# Patient Record
Sex: FEMALE | Race: WHITE | NOT HISPANIC OR LATINO | Employment: FULL TIME | URBAN - METROPOLITAN AREA
[De-identification: names, ages, dates, MRNs, and addresses within clinical notes are randomized per-mention and may not be internally consistent; named-entity substitution may affect disease eponyms.]

---

## 2017-02-06 ENCOUNTER — HOSPITAL ENCOUNTER (OUTPATIENT)
Dept: RADIOLOGY | Facility: MEDICAL CENTER | Age: 57
End: 2017-02-06
Attending: FAMILY MEDICINE
Payer: COMMERCIAL

## 2017-02-06 DIAGNOSIS — Z13.9 SCREENING: ICD-10-CM

## 2017-02-06 PROCEDURE — 77063 BREAST TOMOSYNTHESIS BI: CPT

## 2017-09-21 ENCOUNTER — HOSPITAL ENCOUNTER (OUTPATIENT)
Dept: LAB | Facility: MEDICAL CENTER | Age: 57
End: 2017-09-21
Attending: FAMILY MEDICINE
Payer: COMMERCIAL

## 2017-09-21 LAB
BDY FAT % MEASURED: 34.1 %
BP DIAS: 78 MMHG
BP SYS: 107 MMHG
CHOLEST SERPL-MCNC: 216 MG/DL (ref 100–199)
DIABETES HTDIA: NO
EVENT NAME HTEVT: NORMAL
FASTING HTFAS: YES
GLUCOSE SERPL-MCNC: 78 MG/DL (ref 65–99)
HDLC SERPL-MCNC: 48 MG/DL
HYPERTENSION HTHYP: YES
LDLC SERPL CALC-MCNC: 145 MG/DL
SCREENING LOC CITY HTCIT: NORMAL
SCREENING LOC STATE HTSTA: NORMAL
SCREENING LOCATION HTLOC: NORMAL
SMOKING HTSMO: NO
SUBSCRIBER ID HTSID: NORMAL
TRIGL SERPL-MCNC: 116 MG/DL (ref 0–149)

## 2017-09-21 PROCEDURE — 80061 LIPID PANEL: CPT

## 2017-09-21 PROCEDURE — 36415 COLL VENOUS BLD VENIPUNCTURE: CPT

## 2017-09-21 PROCEDURE — S5190 WELLNESS ASSESSMENT BY NONPH: HCPCS

## 2017-09-21 PROCEDURE — 82947 ASSAY GLUCOSE BLOOD QUANT: CPT

## 2018-02-09 ENCOUNTER — HOSPITAL ENCOUNTER (OUTPATIENT)
Dept: LAB | Facility: MEDICAL CENTER | Age: 58
End: 2018-02-09
Attending: FAMILY MEDICINE
Payer: COMMERCIAL

## 2018-02-09 LAB
25(OH)D3 SERPL-MCNC: 17 NG/ML (ref 30–100)
ALBUMIN SERPL BCP-MCNC: 5.3 G/DL (ref 3.2–4.9)
ALBUMIN/GLOB SERPL: 2.3 G/DL
ALP SERPL-CCNC: 59 U/L (ref 30–99)
ALT SERPL-CCNC: 17 U/L (ref 2–50)
ANION GAP SERPL CALC-SCNC: 7 MMOL/L (ref 0–11.9)
AST SERPL-CCNC: 17 U/L (ref 12–45)
BASOPHILS # BLD AUTO: 0.7 % (ref 0–1.8)
BASOPHILS # BLD: 0.03 K/UL (ref 0–0.12)
BILIRUB SERPL-MCNC: 0.8 MG/DL (ref 0.1–1.5)
BUN SERPL-MCNC: 21 MG/DL (ref 8–22)
CALCIUM SERPL-MCNC: 9.7 MG/DL (ref 8.5–10.5)
CHLORIDE SERPL-SCNC: 101 MMOL/L (ref 96–112)
CO2 SERPL-SCNC: 28 MMOL/L (ref 20–33)
CREAT SERPL-MCNC: 0.77 MG/DL (ref 0.5–1.4)
CRP SERPL HS-MCNC: 1.1 MG/L (ref 0–7.5)
DHEA-S SERPL-MCNC: 37.2 UG/DL (ref 18.9–205)
EOSINOPHIL # BLD AUTO: 0.11 K/UL (ref 0–0.51)
EOSINOPHIL NFR BLD: 2.5 % (ref 0–6.9)
ERYTHROCYTE [DISTWIDTH] IN BLOOD BY AUTOMATED COUNT: 41.1 FL (ref 35.9–50)
EST. AVERAGE GLUCOSE BLD GHB EST-MCNC: 105 MG/DL
ESTRADIOL SERPL-MCNC: <20 PG/ML
GLOBULIN SER CALC-MCNC: 2.3 G/DL (ref 1.9–3.5)
GLUCOSE SERPL-MCNC: 89 MG/DL (ref 65–99)
HBA1C MFR BLD: 5.3 % (ref 0–5.6)
HCT VFR BLD AUTO: 44.9 % (ref 37–47)
HCYS SERPL-SCNC: 13.22 UMOL/L
HGB BLD-MCNC: 15.7 G/DL (ref 12–16)
IMM GRANULOCYTES # BLD AUTO: 0.01 K/UL (ref 0–0.11)
IMM GRANULOCYTES NFR BLD AUTO: 0.2 % (ref 0–0.9)
LYMPHOCYTES # BLD AUTO: 1.96 K/UL (ref 1–4.8)
LYMPHOCYTES NFR BLD: 44.4 % (ref 22–41)
MCH RBC QN AUTO: 33 PG (ref 27–33)
MCHC RBC AUTO-ENTMCNC: 35 G/DL (ref 33.6–35)
MCV RBC AUTO: 94.3 FL (ref 81.4–97.8)
MONOCYTES # BLD AUTO: 0.42 K/UL (ref 0–0.85)
MONOCYTES NFR BLD AUTO: 9.5 % (ref 0–13.4)
NEUTROPHILS # BLD AUTO: 1.88 K/UL (ref 2–7.15)
NEUTROPHILS NFR BLD: 42.7 % (ref 44–72)
NRBC # BLD AUTO: 0 K/UL
NRBC BLD-RTO: 0 /100 WBC
PLATELET # BLD AUTO: 339 K/UL (ref 164–446)
PMV BLD AUTO: 9.8 FL (ref 9–12.9)
POTASSIUM SERPL-SCNC: 4 MMOL/L (ref 3.6–5.5)
PROGEST SERPL-MCNC: <0.08 NG/ML
PROT SERPL-MCNC: 7.6 G/DL (ref 6–8.2)
RBC # BLD AUTO: 4.76 M/UL (ref 4.2–5.4)
SODIUM SERPL-SCNC: 136 MMOL/L (ref 135–145)
T3FREE SERPL-MCNC: 3.73 PG/ML (ref 2.4–4.2)
THYROPEROXIDASE AB SERPL-ACNC: <0.2 IU/ML (ref 0–9)
TSH SERPL DL<=0.005 MIU/L-ACNC: 2.2 UIU/ML (ref 0.38–5.33)
WBC # BLD AUTO: 4.4 K/UL (ref 4.8–10.8)

## 2018-02-09 PROCEDURE — 82627 DEHYDROEPIANDROSTERONE: CPT

## 2018-02-09 PROCEDURE — 83036 HEMOGLOBIN GLYCOSYLATED A1C: CPT

## 2018-02-09 PROCEDURE — 86141 C-REACTIVE PROTEIN HS: CPT

## 2018-02-09 PROCEDURE — 84403 ASSAY OF TOTAL TESTOSTERONE: CPT

## 2018-02-09 PROCEDURE — 85025 COMPLETE CBC W/AUTO DIFF WBC: CPT

## 2018-02-09 PROCEDURE — 84270 ASSAY OF SEX HORMONE GLOBUL: CPT

## 2018-02-09 PROCEDURE — 84443 ASSAY THYROID STIM HORMONE: CPT

## 2018-02-09 PROCEDURE — 84144 ASSAY OF PROGESTERONE: CPT

## 2018-02-09 PROCEDURE — 86800 THYROGLOBULIN ANTIBODY: CPT

## 2018-02-09 PROCEDURE — 82670 ASSAY OF TOTAL ESTRADIOL: CPT

## 2018-02-09 PROCEDURE — 84305 ASSAY OF SOMATOMEDIN: CPT

## 2018-02-09 PROCEDURE — 86376 MICROSOMAL ANTIBODY EACH: CPT

## 2018-02-09 PROCEDURE — 80053 COMPREHEN METABOLIC PANEL: CPT

## 2018-02-09 PROCEDURE — 82306 VITAMIN D 25 HYDROXY: CPT

## 2018-02-09 PROCEDURE — 83525 ASSAY OF INSULIN: CPT

## 2018-02-09 PROCEDURE — 36415 COLL VENOUS BLD VENIPUNCTURE: CPT

## 2018-02-09 PROCEDURE — 83090 ASSAY OF HOMOCYSTEINE: CPT

## 2018-02-09 PROCEDURE — 84481 FREE ASSAY (FT-3): CPT

## 2018-02-10 LAB
INSULIN P FAST SERPL-ACNC: 12 UIU/ML (ref 3–19)
THYROGLOB AB SERPL-ACNC: <0.9 IU/ML (ref 0–4)

## 2018-02-12 LAB
SHBG SERPL-SCNC: 39 NMOL/L (ref 30–135)
TESTOST FREE SERPL-MCNC: 1.6 PG/ML (ref 0.6–3.8)
TESTOST SERPL-MCNC: 11 NG/DL (ref 9–55)

## 2018-02-16 LAB — IGF BP1 SERPL-MCNC: <5 NG/ML (ref 5–34)

## 2018-08-31 ENCOUNTER — HOSPITAL ENCOUNTER (OUTPATIENT)
Dept: LAB | Facility: MEDICAL CENTER | Age: 58
End: 2018-08-31
Payer: COMMERCIAL

## 2018-08-31 LAB
BDY FAT % MEASURED: 32.9 %
BP DIAS: 68 MMHG
BP SYS: 107 MMHG
CHOLEST SERPL-MCNC: 207 MG/DL (ref 100–199)
DIABETES HTDIA: NO
EVENT NAME HTEVT: NORMAL
FASTING HTFAS: YES
GLUCOSE SERPL-MCNC: 86 MG/DL (ref 65–99)
HDLC SERPL-MCNC: 48 MG/DL
HYPERTENSION HTHYP: YES
LDLC SERPL CALC-MCNC: 135 MG/DL
SCREENING LOC CITY HTCIT: NORMAL
SCREENING LOC STATE HTSTA: NORMAL
SCREENING LOCATION HTLOC: NORMAL
SMOKING HTSMO: NO
SUBSCRIBER ID HTSID: NORMAL
TRIGL SERPL-MCNC: 118 MG/DL (ref 0–149)

## 2018-08-31 PROCEDURE — 80061 LIPID PANEL: CPT

## 2018-08-31 PROCEDURE — 82947 ASSAY GLUCOSE BLOOD QUANT: CPT

## 2018-08-31 PROCEDURE — S5190 WELLNESS ASSESSMENT BY NONPH: HCPCS

## 2018-08-31 PROCEDURE — 36415 COLL VENOUS BLD VENIPUNCTURE: CPT

## 2018-09-24 ENCOUNTER — NON-PROVIDER VISIT (OUTPATIENT)
Dept: OCCUPATIONAL MEDICINE | Facility: CLINIC | Age: 58
End: 2018-09-24

## 2018-09-24 DIAGNOSIS — Z23 NEED FOR VACCINATION: ICD-10-CM

## 2018-09-30 PROCEDURE — 90686 IIV4 VACC NO PRSV 0.5 ML IM: CPT | Performed by: PREVENTIVE MEDICINE

## 2018-12-12 ENCOUNTER — OFFICE VISIT (OUTPATIENT)
Dept: INTERNAL MEDICINE | Facility: IMAGING CENTER | Age: 58
End: 2018-12-12
Payer: COMMERCIAL

## 2018-12-12 VITALS
TEMPERATURE: 98 F | WEIGHT: 172 LBS | HEIGHT: 71 IN | BODY MASS INDEX: 24.08 KG/M2 | HEART RATE: 69 BPM | DIASTOLIC BLOOD PRESSURE: 80 MMHG | SYSTOLIC BLOOD PRESSURE: 132 MMHG | OXYGEN SATURATION: 98 % | RESPIRATION RATE: 14 BRPM

## 2018-12-12 DIAGNOSIS — Z78.0 POSTMENOPAUSAL: ICD-10-CM

## 2018-12-12 DIAGNOSIS — Z79.899 LONG TERM USE OF DRUG: ICD-10-CM

## 2018-12-12 DIAGNOSIS — I15.9 SECONDARY HYPERTENSION, UNSPECIFIED: ICD-10-CM

## 2018-12-12 DIAGNOSIS — F41.9 ANXIETY: ICD-10-CM

## 2018-12-12 DIAGNOSIS — G47.62 LEG CRAMPS, SLEEP RELATED: ICD-10-CM

## 2018-12-12 DIAGNOSIS — F41.9 ANXIETY AND DEPRESSION: ICD-10-CM

## 2018-12-12 DIAGNOSIS — I10 ESSENTIAL HYPERTENSION: ICD-10-CM

## 2018-12-12 DIAGNOSIS — R23.2 HOT FLASHES: ICD-10-CM

## 2018-12-12 DIAGNOSIS — K21.9 GASTROESOPHAGEAL REFLUX DISEASE WITHOUT ESOPHAGITIS: ICD-10-CM

## 2018-12-12 DIAGNOSIS — F32.A ANXIETY AND DEPRESSION: ICD-10-CM

## 2018-12-12 DIAGNOSIS — F41.1 GENERALIZED ANXIETY DISORDER: ICD-10-CM

## 2018-12-12 PROCEDURE — 99204 OFFICE O/P NEW MOD 45 MIN: CPT | Performed by: INTERNAL MEDICINE

## 2018-12-12 RX ORDER — HYDROCHLOROTHIAZIDE 12.5 MG/1
12.5 CAPSULE, GELATIN COATED ORAL DAILY
Qty: 90 CAP | Refills: 3 | Status: SHIPPED | OUTPATIENT
Start: 2018-12-12 | End: 2019-12-30

## 2018-12-12 RX ORDER — LISINOPRIL 20 MG/1
20 TABLET ORAL DAILY
Qty: 90 TAB | Refills: 3 | Status: SHIPPED | OUTPATIENT
Start: 2018-12-12 | End: 2019-12-30

## 2018-12-12 RX ORDER — LORAZEPAM 0.5 MG/1
0.5 TABLET ORAL
Qty: 30 TAB | Refills: 0 | Status: SHIPPED
Start: 2018-12-12 | End: 2019-01-11

## 2018-12-12 RX ORDER — CITALOPRAM HYDROBROMIDE 10 MG/1
10 TABLET ORAL DAILY
Qty: 30 TAB | Refills: 11 | Status: SHIPPED | OUTPATIENT
Start: 2018-12-12 | End: 2019-05-29

## 2018-12-12 RX ORDER — HYDROCHLOROTHIAZIDE 12.5 MG/1
12.5 CAPSULE, GELATIN COATED ORAL DAILY
Refills: 1 | COMMUNITY
Start: 2018-10-12 | End: 2018-12-12 | Stop reason: SDUPTHER

## 2018-12-12 RX ORDER — LORAZEPAM 0.5 MG/1
0.5 TABLET ORAL DAILY
COMMUNITY
End: 2018-12-12 | Stop reason: SDUPTHER

## 2018-12-12 RX ORDER — POTASSIUM CHLORIDE 750 MG/1
10 TABLET, FILM COATED, EXTENDED RELEASE ORAL DAILY
COMMUNITY
End: 2019-02-07 | Stop reason: SDUPTHER

## 2018-12-12 RX ORDER — PROPRANOLOL HYDROCHLORIDE 20 MG/1
20 TABLET ORAL DAILY
Qty: 90 TAB | Refills: 3 | Status: SHIPPED | OUTPATIENT
Start: 2018-12-12 | End: 2019-12-30

## 2018-12-12 ASSESSMENT — PATIENT HEALTH QUESTIONNAIRE - PHQ9: CLINICAL INTERPRETATION OF PHQ2 SCORE: 0

## 2018-12-12 ASSESSMENT — ENCOUNTER SYMPTOMS
WEIGHT LOSS: 0
PALPITATIONS: 0
DIARRHEA: 0
HEADACHES: 0
BLOOD IN STOOL: 0
NERVOUS/ANXIOUS: 1
DIZZINESS: 0
SHORTNESS OF BREATH: 0
HEARTBURN: 1
DEPRESSION: 1
CONSTIPATION: 0

## 2018-12-12 NOTE — PROGRESS NOTES
New Patient Note      HPI:        Mariza is here to establish to evaluate and monitor HTN and anxiety. She does need refills of medications. She had been on zoloft in past but caused palpitations and effexor caused weight gain and nausea. She has had nocturnal leg cramps intermittently. She has been having hot flashes intermittently and states they are worse at times especially in warmer weather.    Past Medical History:   Diagnosis Date   • Anxiety and depression 12/12/2018   • Cervicalgia    • Depression with anxiety    • Fibroid, uterine    • Gastroesophageal reflux disease without esophagitis 12/12/2018   • Generalized anxiety disorder 12/12/2018   • GERD (gastroesophageal reflux disease)    • Hypertension    • Obesity        Family History   Problem Relation Age of Onset   • Hypertension Mother    • Stroke Mother    • Hyperlipidemia Mother    • Heart Disease Father         colon   • Colon Cancer Paternal Grandmother    • Cancer Paternal Aunt         breast   • Genetic Neg Hx    • Psychiatry Neg Hx    • Thyroid Neg Hx    • Diabetes Neg Hx    • Dementia Neg Hx        Social History   Substance Use Topics   • Smoking status: Never Smoker   • Smokeless tobacco: Never Used   • Alcohol use 0.5 oz/week     1 drink(s) per week      Comment: rare       History   Alcohol Use   • 0.5 oz/week   • 1 drink(s) per week     Comment: rare       History   Drug Use No       Current Outpatient Prescriptions   Medication Sig Dispense Refill   • hydrochlorothiazide (MICROZIDE) 12.5 MG capsule Take 12.5 mg by mouth every day.  1   • Cholecalciferol (VITAMIN D3) 1000 units Cap Take 1,000 Units by mouth every day.     • LORazepam (ATIVAN) 0.5 MG Tab Take 0.5 mg by mouth every day.     • potassium chloride ER (KLOR-CON) 10 MEQ tablet Take 10 mEq by mouth every day.     • citalopram (CELEXA) 10 MG tablet Take 1 Tab by mouth every day. 30 Tab 11   • propranolol (INDERAL) 20 MG TABS Take 1 Tab by mouth 2 times a day. 90 Tab 1   •  lisinopril (PRINIVIL) 20 MG TABS Take 1 Tab by mouth every day. 9 Tab 1   • metaxalone (SKELAXIN) 800 MG Tab Take 1 Tab by mouth 3 times a day as needed for Moderate Pain or Muscle Spasms. 18 Tab 0     No current facility-administered medications for this visit.        No Known Allergies    Review of Systems   Constitutional: Negative for malaise/fatigue and weight loss.   Respiratory: Negative for shortness of breath.    Cardiovascular: Negative for chest pain, palpitations and leg swelling.   Gastrointestinal: Positive for heartburn. Negative for blood in stool, constipation, diarrhea and melena.        Occasional heartburn controlled with dexilant.   Genitourinary: Negative for dysuria.        She has been having hot flashes   Musculoskeletal: Negative for joint pain.   Neurological: Negative for dizziness and headaches.        Occasional lightheadedness   Psychiatric/Behavioral: Positive for depression. The patient is nervous/anxious.         Mild depression       Physical Exam   Constitutional: She is well-developed, well-nourished, and in no distress. No distress.   Neck: No JVD present. No thyromegaly present.   Cardiovascular: Normal rate, regular rhythm, normal heart sounds and intact distal pulses.  Exam reveals no gallop and no friction rub.    No murmur heard.  Pulmonary/Chest: Effort normal and breath sounds normal. She has no wheezes. She has no rales.   Abdominal: Soft. She exhibits no distension and no mass. There is no tenderness.   Genitourinary:   Genitourinary Comments: No flank tenderness   Musculoskeletal: She exhibits no edema.   No spine tenderness from cervical to lumbar   Neurological: She is alert. No cranial nerve deficit. Gait normal. Coordination normal.   Skin: She is not diaphoretic.   Psychiatric: Mood and affect normal.     ACC/AHA 10 year CV risk 4.82%    Ambulatory Vitals  /80 (BP Location: Left arm, Patient Position: Sitting, BP Cuff Size: Adult)   Pulse 69   Temp 36.7 °C  "(98 °F) (Temporal)   Resp 14   Ht 1.803 m (5' 11\")   Wt 78 kg (172 lb)   LMP 12/12/2005   SpO2 98%   BMI 23.99 kg/m²     Assessment and Plan:    1. Essential hypertension  COMP METABOLIC PANEL   2. Generalized anxiety disorder     3. Gastroesophageal reflux disease without esophagitis  CBC WITH DIFFERENTIAL   4. Anxiety and depression  citalopram (CELEXA) 10 MG tablet    VITAMIN B12    TSH   5. Hot flashes  citalopram (CELEXA) 10 MG tablet    FSH/LH    ESTRADIOL   6. Long term use of drug  CBC WITH DIFFERENTIAL    COMP METABOLIC PANEL   7. Leg cramps, sleep related  VITAMIN B12    CBC WITH DIFFERENTIAL    COMP METABOLIC PANEL    MAGNESIUM    VITAMIN D,25 HYDROXY    TSH      She states she is willing to try low dose citalopram for her depression and anxiety along with possibly helping with hot flashes. Follow up in 4-8 weeks. Schedule yearly mammogram and obtain baseline DEXA. Continue lisinopril and HCTZ for HTN.  Face to face time: 40 minutes with greater than 50% of time spent with direct patient contact and medical management.   Chaitanya Presley M.D.    "

## 2019-01-14 RX ORDER — DEXLANSOPRAZOLE 30 MG/1
30 CAPSULE, DELAYED RELEASE ORAL DAILY
Qty: 30 CAP | Refills: 5 | Status: SHIPPED | OUTPATIENT
Start: 2019-01-14 | End: 2019-05-29 | Stop reason: CLARIF

## 2019-02-07 RX ORDER — POTASSIUM CHLORIDE 750 MG/1
10 TABLET, FILM COATED, EXTENDED RELEASE ORAL DAILY
Qty: 90 TAB | Refills: 1 | Status: SHIPPED | OUTPATIENT
Start: 2019-02-07 | End: 2019-05-29 | Stop reason: CLARIF

## 2019-05-29 ENCOUNTER — OFFICE VISIT (OUTPATIENT)
Dept: INTERNAL MEDICINE | Facility: IMAGING CENTER | Age: 59
End: 2019-05-29
Payer: COMMERCIAL

## 2019-05-29 VITALS
HEART RATE: 76 BPM | TEMPERATURE: 98.6 F | HEIGHT: 71 IN | OXYGEN SATURATION: 97 % | BODY MASS INDEX: 24.5 KG/M2 | RESPIRATION RATE: 14 BRPM | DIASTOLIC BLOOD PRESSURE: 70 MMHG | SYSTOLIC BLOOD PRESSURE: 120 MMHG | WEIGHT: 175 LBS

## 2019-05-29 DIAGNOSIS — F41.9 ANXIETY: ICD-10-CM

## 2019-05-29 DIAGNOSIS — H61.23 BILATERAL IMPACTED CERUMEN: ICD-10-CM

## 2019-05-29 PROCEDURE — 99213 OFFICE O/P EST LOW 20 MIN: CPT | Performed by: INTERNAL MEDICINE

## 2019-05-29 RX ORDER — POTASSIUM CHLORIDE 750 MG/1
10 TABLET, FILM COATED, EXTENDED RELEASE ORAL 2 TIMES DAILY
COMMUNITY
End: 2019-05-29 | Stop reason: CLARIF

## 2019-05-29 RX ORDER — MAGNESIUM 200 MG
200 TABLET ORAL
COMMUNITY

## 2019-05-29 RX ORDER — POTASSIUM CHLORIDE 750 MG/1
10 TABLET, FILM COATED, EXTENDED RELEASE ORAL DAILY
Qty: 90 TAB | Refills: 3 | Status: SHIPPED | OUTPATIENT
Start: 2019-05-29 | End: 2020-07-09

## 2019-05-29 RX ORDER — LORAZEPAM 0.5 MG/1
0.5 TABLET ORAL
Qty: 30 TAB | Refills: 0 | Status: SHIPPED
Start: 2019-05-29 | End: 2019-06-28

## 2019-05-29 RX ORDER — POTASSIUM CHLORIDE 750 MG/1
10 TABLET, FILM COATED, EXTENDED RELEASE ORAL DAILY
COMMUNITY
End: 2019-05-29 | Stop reason: SDUPTHER

## 2019-05-29 RX ORDER — DEXLANSOPRAZOLE 60 MG/1
60 CAPSULE, DELAYED RELEASE ORAL
COMMUNITY
End: 2020-07-29 | Stop reason: SDUPTHER
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
## 2019-05-30 ENCOUNTER — NON-PROVIDER VISIT (OUTPATIENT)
Dept: INTERNAL MEDICINE | Facility: IMAGING CENTER | Age: 59
End: 2019-05-30
Payer: COMMERCIAL

## 2019-05-30 ENCOUNTER — HOSPITAL ENCOUNTER (OUTPATIENT)
Facility: MEDICAL CENTER | Age: 59
End: 2019-05-30
Attending: INTERNAL MEDICINE
Payer: COMMERCIAL

## 2019-05-30 DIAGNOSIS — Z01.89 ROUTINE LAB DRAW: ICD-10-CM

## 2019-05-30 DIAGNOSIS — Z79.899 LONG TERM USE OF DRUG: ICD-10-CM

## 2019-05-30 DIAGNOSIS — G47.62 LEG CRAMPS, SLEEP RELATED: ICD-10-CM

## 2019-05-30 DIAGNOSIS — I10 ESSENTIAL HYPERTENSION: ICD-10-CM

## 2019-05-30 DIAGNOSIS — F32.A ANXIETY AND DEPRESSION: ICD-10-CM

## 2019-05-30 DIAGNOSIS — F41.9 ANXIETY AND DEPRESSION: ICD-10-CM

## 2019-05-30 DIAGNOSIS — Z11.59 NEED FOR HEPATITIS C SCREENING TEST: ICD-10-CM

## 2019-05-30 DIAGNOSIS — K21.9 GASTROESOPHAGEAL REFLUX DISEASE WITHOUT ESOPHAGITIS: ICD-10-CM

## 2019-05-30 DIAGNOSIS — R23.2 HOT FLASHES: ICD-10-CM

## 2019-05-30 LAB
25(OH)D3 SERPL-MCNC: 18 NG/ML (ref 30–100)
ALBUMIN SERPL BCP-MCNC: 5.2 G/DL (ref 3.2–4.9)
ALBUMIN/GLOB SERPL: 2.4 G/DL
ALP SERPL-CCNC: 66 U/L (ref 30–99)
ALT SERPL-CCNC: 19 U/L (ref 2–50)
ANION GAP SERPL CALC-SCNC: 8 MMOL/L (ref 0–11.9)
AST SERPL-CCNC: 20 U/L (ref 12–45)
BASOPHILS # BLD AUTO: 0.9 % (ref 0–1.8)
BASOPHILS # BLD: 0.05 K/UL (ref 0–0.12)
BILIRUB SERPL-MCNC: 0.8 MG/DL (ref 0.1–1.5)
BUN SERPL-MCNC: 19 MG/DL (ref 8–22)
CALCIUM SERPL-MCNC: 9.9 MG/DL (ref 8.5–10.5)
CHLORIDE SERPL-SCNC: 102 MMOL/L (ref 96–112)
CO2 SERPL-SCNC: 28 MMOL/L (ref 20–33)
CREAT SERPL-MCNC: 0.8 MG/DL (ref 0.5–1.4)
EOSINOPHIL # BLD AUTO: 0.07 K/UL (ref 0–0.51)
EOSINOPHIL NFR BLD: 1.3 % (ref 0–6.9)
ERYTHROCYTE [DISTWIDTH] IN BLOOD BY AUTOMATED COUNT: 43.8 FL (ref 35.9–50)
ESTRADIOL SERPL-MCNC: <20 PG/ML
FSH SERPL-ACNC: 50.9 MIU/ML
GLOBULIN SER CALC-MCNC: 2.2 G/DL (ref 1.9–3.5)
GLUCOSE SERPL-MCNC: 85 MG/DL (ref 65–99)
HCT VFR BLD AUTO: 47 % (ref 37–47)
HCV AB SER QL: NEGATIVE
HGB BLD-MCNC: 15.6 G/DL (ref 12–16)
IMM GRANULOCYTES # BLD AUTO: 0.02 K/UL (ref 0–0.11)
IMM GRANULOCYTES NFR BLD AUTO: 0.4 % (ref 0–0.9)
LH SERPL-ACNC: 36 IU/L
LYMPHOCYTES # BLD AUTO: 2.18 K/UL (ref 1–4.8)
LYMPHOCYTES NFR BLD: 41.3 % (ref 22–41)
MAGNESIUM SERPL-MCNC: 2.3 MG/DL (ref 1.5–2.5)
MCH RBC QN AUTO: 32.3 PG (ref 27–33)
MCHC RBC AUTO-ENTMCNC: 33.2 G/DL (ref 33.6–35)
MCV RBC AUTO: 97.3 FL (ref 81.4–97.8)
MONOCYTES # BLD AUTO: 0.64 K/UL (ref 0–0.85)
MONOCYTES NFR BLD AUTO: 12.1 % (ref 0–13.4)
NEUTROPHILS # BLD AUTO: 2.32 K/UL (ref 2–7.15)
NEUTROPHILS NFR BLD: 44 % (ref 44–72)
NRBC # BLD AUTO: 0 K/UL
NRBC BLD-RTO: 0 /100 WBC
PLATELET # BLD AUTO: 346 K/UL (ref 164–446)
PMV BLD AUTO: 10 FL (ref 9–12.9)
POTASSIUM SERPL-SCNC: 3.9 MMOL/L (ref 3.6–5.5)
PROT SERPL-MCNC: 7.4 G/DL (ref 6–8.2)
RBC # BLD AUTO: 4.83 M/UL (ref 4.2–5.4)
SODIUM SERPL-SCNC: 138 MMOL/L (ref 135–145)
TSH SERPL DL<=0.005 MIU/L-ACNC: 2.98 UIU/ML (ref 0.38–5.33)
VIT B12 SERPL-MCNC: 425 PG/ML (ref 211–911)
WBC # BLD AUTO: 5.3 K/UL (ref 4.8–10.8)

## 2019-05-30 PROCEDURE — 80053 COMPREHEN METABOLIC PANEL: CPT

## 2019-05-30 PROCEDURE — 84443 ASSAY THYROID STIM HORMONE: CPT

## 2019-05-30 PROCEDURE — 82607 VITAMIN B-12: CPT

## 2019-05-30 PROCEDURE — 83001 ASSAY OF GONADOTROPIN (FSH): CPT

## 2019-05-30 PROCEDURE — 85025 COMPLETE CBC W/AUTO DIFF WBC: CPT

## 2019-05-30 PROCEDURE — 82670 ASSAY OF TOTAL ESTRADIOL: CPT

## 2019-05-30 PROCEDURE — 83735 ASSAY OF MAGNESIUM: CPT

## 2019-05-30 PROCEDURE — 83002 ASSAY OF GONADOTROPIN (LH): CPT

## 2019-05-30 PROCEDURE — 86803 HEPATITIS C AB TEST: CPT

## 2019-05-30 PROCEDURE — 82306 VITAMIN D 25 HYDROXY: CPT

## 2019-06-17 ENCOUNTER — OFFICE VISIT (OUTPATIENT)
Dept: INTERNAL MEDICINE | Facility: IMAGING CENTER | Age: 59
End: 2019-06-17
Payer: COMMERCIAL

## 2019-06-17 VITALS
BODY MASS INDEX: 24.5 KG/M2 | HEIGHT: 71 IN | HEART RATE: 68 BPM | WEIGHT: 175 LBS | DIASTOLIC BLOOD PRESSURE: 78 MMHG | OXYGEN SATURATION: 97 % | SYSTOLIC BLOOD PRESSURE: 112 MMHG | RESPIRATION RATE: 14 BRPM | TEMPERATURE: 98.6 F

## 2019-06-17 DIAGNOSIS — I10 ESSENTIAL HYPERTENSION: ICD-10-CM

## 2019-06-17 DIAGNOSIS — E78.00 HYPERCHOLESTEROLEMIA: ICD-10-CM

## 2019-06-17 DIAGNOSIS — Z79.899 LONG TERM USE OF DRUG: ICD-10-CM

## 2019-06-17 DIAGNOSIS — E55.9 VITAMIN D DEFICIENCY: ICD-10-CM

## 2019-06-17 PROCEDURE — 99213 OFFICE O/P EST LOW 20 MIN: CPT | Performed by: INTERNAL MEDICINE

## 2019-06-17 NOTE — PROGRESS NOTES
Established Patient Note   HPI:        Mariza is here today to follow up HTN and review recent lab work she has done recently. She checks BP on occasion at home with systolic BP ranging 110-120. Her prior cholesterol in healthy tracks was mildly elevated    Past Medical History:   Diagnosis Date   • Anxiety and depression 12/12/2018   • Cervicalgia    • Depression with anxiety    • Fibroid, uterine    • Gastroesophageal reflux disease without esophagitis 12/12/2018   • Generalized anxiety disorder 12/12/2018   • GERD (gastroesophageal reflux disease)    • Hypertension    • Obesity        Current Outpatient Prescriptions   Medication Sig Dispense Refill   • Cholecalciferol (VITAMIN D3) 1000 units Cap Take 2,000 Units by mouth every day.     • Dexlansoprazole (DEXILANT) 60 MG CAPSULE DELAYED RELEASE delayed-release capsule Take 60 mg by mouth.     • Magnesium 200 MG Tab Take 200 mg by mouth.     • potassium chloride ER (KLOR-CON) 10 MEQ tablet Take 1 Tab by mouth every day. 90 Tab 3   • lisinopril (PRINIVIL) 20 MG Tab Take 1 Tab by mouth every day. 90 Tab 3   • propranolol (INDERAL) 20 MG Tab Take 1 Tab by mouth every day. 90 Tab 3   • hydrochlorothiazide (MICROZIDE) 12.5 MG capsule Take 1 Cap by mouth every day. 90 Cap 3   • LORazepam (ATIVAN) 0.5 MG Tab Take 1 Tab by mouth 1 time daily as needed for Anxiety for up to 30 days. 30 Tab 0   • metaxalone (SKELAXIN) 800 MG Tab Take 1 Tab by mouth 3 times a day as needed for Moderate Pain or Muscle Spasms. 18 Tab 0     No current facility-administered medications for this visit.          No Known Allergies      Social History   Substance Use Topics   • Smoking status: Never Smoker   • Smokeless tobacco: Never Used   • Alcohol use 0.5 oz/week     1 drink(s) per week      Comment: rare     History   Alcohol Use   • 0.5 oz/week   • 1 drink(s) per week     Comment: rare     History   Drug Use No       ROS    Ambulatory Vitals  /78 (BP Location: Left arm, Patient  "Position: Sitting, BP Cuff Size: Adult)   Pulse 68   Temp 37 °C (98.6 °F) (Temporal)   Resp 14   Ht 1.803 m (5' 11\")   Wt 79.4 kg (175 lb)   LMP 12/12/2005   SpO2 97%   BMI 24.41 kg/m²     Physical Exam    Component      Latest Ref Rng & Units 5/30/2019   WBC      4.8 - 10.8 K/uL 5.3   RBC      4.20 - 5.40 M/uL 4.83   Hemoglobin      12.0 - 16.0 g/dL 15.6   Hematocrit      37.0 - 47.0 % 47.0   MCV      81.4 - 97.8 fL 97.3   MCH      27.0 - 33.0 pg 32.3   MCHC      33.6 - 35.0 g/dL 33.2 (L)   RDW      35.9 - 50.0 fL 43.8   Platelet Count      164 - 446 K/uL 346   MPV      9.0 - 12.9 fL 10.0   Neutrophils-Polys      44.00 - 72.00 % 44.00   Lymphocytes      22.00 - 41.00 % 41.30 (H)   Monocytes      0.00 - 13.40 % 12.10   Eosinophils      0.00 - 6.90 % 1.30   Basophils      0.00 - 1.80 % 0.90   Immature Granulocytes      0.00 - 0.90 % 0.40   Nucleated RBC      /100 WBC 0.00   Neutrophils (Absolute)      2.00 - 7.15 K/uL 2.32   Lymphs (Absolute)      1.00 - 4.80 K/uL 2.18   Monos (Absolute)      0.00 - 0.85 K/uL 0.64   Eos (Absolute)      0.00 - 0.51 K/uL 0.07   Baso (Absolute)      0.00 - 0.12 K/uL 0.05   Immature Granulocytes (abs)      0.00 - 0.11 K/uL 0.02   NRBC (Absolute)      K/uL 0.00   Sodium      135 - 145 mmol/L 138   Potassium      3.6 - 5.5 mmol/L 3.9   Chloride      96 - 112 mmol/L 102   Co2      20 - 33 mmol/L 28   Anion Gap      0.0 - 11.9 8.0   Glucose      65 - 99 mg/dL 85   Bun      8 - 22 mg/dL 19   Creatinine      0.50 - 1.40 mg/dL 0.80   Calcium      8.5 - 10.5 mg/dL 9.9   AST(SGOT)      12 - 45 U/L 20   ALT(SGPT)      2 - 50 U/L 19   Alkaline Phosphatase      30 - 99 U/L 66   Total Bilirubin      0.1 - 1.5 mg/dL 0.8   Albumin      3.2 - 4.9 g/dL 5.2 (H)   Total Protein      6.0 - 8.2 g/dL 7.4   Globulin      1.9 - 3.5 g/dL 2.2   A-G Ratio      g/dL 2.4   Luteinizing Hormone      IU/L 36.0   Follicle Stimulating Hormone      mIU/mL 50.9   GFR If African American      >60 mL/min/1.73 m 2 >60 "   GFR If Non African American      >60 mL/min/1.73 m 2 >60   Vitamin B12 -True Cobalamin      211 - 911 pg/mL 425   Estradiol-E2      pg/mL <20.0   Magnesium      1.5 - 2.5 mg/dL 2.3   25-Hydroxy   Vitamin D 25      30 - 100 ng/mL 18 (L)   TSH      0.380 - 5.330 uIU/mL 2.980   Hepatitis C Antibody      Negative Negative     Assessment and Plan:     1. Essential hypertension  LipoFit by NMR    METABOLIC PANEL,COMPREHENSIVE   2. Vitamin D deficiency  VITAMIN D,25 HYDROXY   3. Long term use of drug  METABOLIC PANEL,COMPREHENSIVE   4. Hypercholesterolemia  LipoFit by NMR     She has recently restarted taking 2,000 IU vitamin D3 qd. Check CMP, lipoprofile NMR and vitamin D in 6 months with follow up after.    Chaitanya Presley M.D.

## 2019-09-19 ENCOUNTER — HOSPITAL ENCOUNTER (OUTPATIENT)
Dept: LAB | Facility: MEDICAL CENTER | Age: 59
End: 2019-09-19
Payer: COMMERCIAL

## 2019-09-19 LAB
CHOLEST SERPL-MCNC: 225 MG/DL (ref 100–199)
DIABETES HTDIA: NO
EVENT NAME HTEVT: NORMAL
FASTING HTFAS: YES
GLUCOSE SERPL-MCNC: 88 MG/DL (ref 65–99)
HDLC SERPL-MCNC: 49 MG/DL
LDLC SERPL CALC-MCNC: 154 MG/DL
SCREENING LOC CITY HTCIT: NORMAL
SCREENING LOC STATE HTSTA: NORMAL
SCREENING LOCATION HTLOC: NORMAL
SMOKING HTSMO: NO
SUBSCRIBER ID HTSID: NORMAL
TRIGL SERPL-MCNC: 112 MG/DL (ref 0–149)

## 2019-09-19 PROCEDURE — 80061 LIPID PANEL: CPT

## 2019-09-19 PROCEDURE — 82947 ASSAY GLUCOSE BLOOD QUANT: CPT

## 2019-09-19 PROCEDURE — 36415 COLL VENOUS BLD VENIPUNCTURE: CPT

## 2019-09-19 PROCEDURE — S5190 WELLNESS ASSESSMENT BY NONPH: HCPCS

## 2019-09-23 LAB
BDY FAT % MEASURED: 35.6 %
BP DIAS: 87 MMHG
BP SYS: 126 MMHG
HYPERTENSION HTHYP: YES

## 2019-10-03 ENCOUNTER — IMMUNIZATION (OUTPATIENT)
Dept: OCCUPATIONAL MEDICINE | Facility: CLINIC | Age: 59
End: 2019-10-03

## 2019-10-03 DIAGNOSIS — Z23 NEED FOR VACCINATION: ICD-10-CM

## 2019-10-03 PROCEDURE — 90686 IIV4 VACC NO PRSV 0.5 ML IM: CPT | Performed by: PREVENTIVE MEDICINE

## 2019-10-28 ENCOUNTER — OFFICE VISIT (OUTPATIENT)
Dept: INTERNAL MEDICINE | Facility: IMAGING CENTER | Age: 59
End: 2019-10-28
Payer: COMMERCIAL

## 2019-10-28 VITALS
DIASTOLIC BLOOD PRESSURE: 64 MMHG | RESPIRATION RATE: 14 BRPM | WEIGHT: 175 LBS | SYSTOLIC BLOOD PRESSURE: 126 MMHG | BODY MASS INDEX: 24.5 KG/M2 | OXYGEN SATURATION: 97 % | HEART RATE: 74 BPM | HEIGHT: 71 IN | TEMPERATURE: 97.7 F

## 2019-10-28 DIAGNOSIS — M54.42 ACUTE BILATERAL LOW BACK PAIN WITH LEFT-SIDED SCIATICA: ICD-10-CM

## 2019-10-28 PROCEDURE — 99213 OFFICE O/P EST LOW 20 MIN: CPT | Performed by: INTERNAL MEDICINE

## 2019-10-28 RX ORDER — CARISOPRODOL 350 MG/1
350 TABLET ORAL EVERY 8 HOURS PRN
Qty: 30 TAB | Refills: 2 | Status: SHIPPED | OUTPATIENT
Start: 2019-10-28 | End: 2021-07-14 | Stop reason: SDUPTHER

## 2019-10-28 RX ORDER — METHYLPREDNISOLONE 4 MG/1
TABLET ORAL
Qty: 21 TAB | Refills: 0 | Status: SHIPPED | OUTPATIENT
Start: 2019-10-28 | End: 2020-08-10

## 2019-10-28 ASSESSMENT — PATIENT HEALTH QUESTIONNAIRE - PHQ9
2. FEELING DOWN, DEPRESSED, IRRITABLE, OR HOPELESS: SEVERAL DAYS
6. FEELING BAD ABOUT YOURSELF - OR THAT YOU ARE A FAILURE OR HAVE LET YOURSELF OR YOUR FAMILY DOWN: NOT AL ALL
SUM OF ALL RESPONSES TO PHQ QUESTIONS 1-9: 1
7. TROUBLE CONCENTRATING ON THINGS, SUCH AS READING THE NEWSPAPER OR WATCHING TELEVISION: NOT AT ALL
9. THOUGHTS THAT YOU WOULD BE BETTER OFF DEAD, OR OF HURTING YOURSELF: NOT AT ALL
1. LITTLE INTEREST OR PLEASURE IN DOING THINGS: NOT AT ALL
5. POOR APPETITE OR OVEREATING: NOT AT ALL
3. TROUBLE FALLING OR STAYING ASLEEP OR SLEEPING TOO MUCH: NOT AT ALL
8. MOVING OR SPEAKING SO SLOWLY THAT OTHER PEOPLE COULD HAVE NOTICED. OR THE OPPOSITE, BEING SO FIGETY OR RESTLESS THAT YOU HAVE BEEN MOVING AROUND A LOT MORE THAN USUAL: NOT AT ALL
4. FEELING TIRED OR HAVING LITTLE ENERGY: NOT AT ALL
SUM OF ALL RESPONSES TO PHQ9 QUESTIONS 1 AND 2: 1

## 2019-10-28 NOTE — PROGRESS NOTES
Established Patient Note   HPI:        Mariza comes in today with complaint of low back pain for past 3 days after bending over in shower to shave legs. Pain does run across lower back on both sides. She has used salonpas and ibuprofen and ice which has helped. She saw chiropractor on Friday without much benefit. She does have some pain radiating down to left hip.    Past Medical History:   Diagnosis Date   • Anxiety and depression 12/12/2018   • Cervicalgia    • Depression with anxiety    • Fibroid, uterine    • Gastroesophageal reflux disease without esophagitis 12/12/2018   • Generalized anxiety disorder 12/12/2018   • GERD (gastroesophageal reflux disease)    • Hypertension    • Obesity        Current Outpatient Medications   Medication Sig Dispense Refill   • methylPREDNISolone (MEDROL DOSEPAK) 4 MG Tablet Therapy Pack As directed on the packaging label. 21 Tab 0   • carisoprodol (SOMA) 350 MG Tab Take 1 Tab by mouth every 8 hours as needed for Muscle Spasms for up to 30 days. 30 Tab 2   • Diclofenac Sodium 1 % Gel Apply 1 g to skin as directed 3 times a day as needed. To affected area. 1 Tube 1   • Cholecalciferol (VITAMIN D3) 1000 units Cap Take 2,000 Units by mouth every day.     • Dexlansoprazole (DEXILANT) 60 MG CAPSULE DELAYED RELEASE delayed-release capsule Take 60 mg by mouth.     • Magnesium 200 MG Tab Take 200 mg by mouth.     • potassium chloride ER (KLOR-CON) 10 MEQ tablet Take 1 Tab by mouth every day. 90 Tab 3   • lisinopril (PRINIVIL) 20 MG Tab Take 1 Tab by mouth every day. 90 Tab 3   • propranolol (INDERAL) 20 MG Tab Take 1 Tab by mouth every day. 90 Tab 3   • hydrochlorothiazide (MICROZIDE) 12.5 MG capsule Take 1 Cap by mouth every day. 90 Cap 3     No current facility-administered medications for this visit.          Allergies   Allergen Reactions   • Flexeril [Cyclobenzaprine]      Insomnia         Social History     Tobacco Use   • Smoking status: Never Smoker   • Smokeless tobacco: Never  "Used   Substance Use Topics   • Alcohol use: Yes     Alcohol/week: 0.5 oz     Types: 1 drink(s) per week     Comment: rare   • Drug use: No       Past Surgical History:   Procedure Laterality Date   • HYSTERECTOMY, VAGINAL  2005        ROS    Ambulatory Vitals  /64 (BP Location: Left arm, Patient Position: Sitting, BP Cuff Size: Adult)   Pulse 74   Temp 36.5 °C (97.7 °F) (Temporal)   Resp 14   Ht 1.803 m (5' 11\")   Wt 79.4 kg (175 lb)   LMP 12/12/2005   SpO2 97%   BMI 24.41 kg/m²     Physical Exam   Musculoskeletal:   No spine tenderness over lumbar or over buttock on left side         Assessment and Plan:     1. Acute bilateral low back pain with left-sided sciatica  REFERRAL TO PHYSICAL THERAPY Reason for Therapy: Eval/Treat/Report    methylPREDNISolone (MEDROL DOSEPAK) 4 MG Tablet Therapy Pack    carisoprodol (SOMA) 350 MG Tab     She will continue ibuprofen up to 800 mg tid prn. Rx medrol dose pack for 6 days. Soma tid prn. Refer to physical therapy. If back pain does not improve over next 2-4 weeks she will need further work up with MRI lumbosacral.    Chaitanya Presley M.D.  "

## 2019-11-14 ENCOUNTER — APPOINTMENT (OUTPATIENT)
Dept: PHYSICAL THERAPY | Facility: REHABILITATION | Age: 59
End: 2019-11-14
Attending: INTERNAL MEDICINE
Payer: COMMERCIAL

## 2019-11-19 ENCOUNTER — APPOINTMENT (OUTPATIENT)
Dept: PHYSICAL THERAPY | Facility: REHABILITATION | Age: 59
End: 2019-11-19
Attending: INTERNAL MEDICINE
Payer: COMMERCIAL

## 2019-11-21 ENCOUNTER — APPOINTMENT (OUTPATIENT)
Dept: PHYSICAL THERAPY | Facility: REHABILITATION | Age: 59
End: 2019-11-21
Attending: INTERNAL MEDICINE
Payer: COMMERCIAL

## 2019-11-26 ENCOUNTER — APPOINTMENT (OUTPATIENT)
Dept: PHYSICAL THERAPY | Facility: REHABILITATION | Age: 59
End: 2019-11-26
Attending: INTERNAL MEDICINE
Payer: COMMERCIAL

## 2019-11-26 DIAGNOSIS — F41.9 ANXIETY: ICD-10-CM

## 2019-11-26 RX ORDER — LORAZEPAM 0.5 MG/1
0.5 TABLET ORAL
Qty: 30 TAB | Refills: 0 | Status: SHIPPED
Start: 2019-11-26 | End: 2020-03-13 | Stop reason: SDUPTHER

## 2019-12-03 ENCOUNTER — APPOINTMENT (OUTPATIENT)
Dept: PHYSICAL THERAPY | Facility: REHABILITATION | Age: 59
End: 2019-12-03
Attending: INTERNAL MEDICINE
Payer: COMMERCIAL

## 2019-12-06 ENCOUNTER — APPOINTMENT (OUTPATIENT)
Dept: PHYSICAL THERAPY | Facility: REHABILITATION | Age: 59
End: 2019-12-06
Attending: INTERNAL MEDICINE
Payer: COMMERCIAL

## 2019-12-10 ENCOUNTER — APPOINTMENT (OUTPATIENT)
Dept: PHYSICAL THERAPY | Facility: REHABILITATION | Age: 59
End: 2019-12-10
Payer: COMMERCIAL

## 2019-12-12 ENCOUNTER — APPOINTMENT (OUTPATIENT)
Dept: PHYSICAL THERAPY | Facility: REHABILITATION | Age: 59
End: 2019-12-12
Attending: INTERNAL MEDICINE
Payer: COMMERCIAL

## 2019-12-29 DIAGNOSIS — I15.9 SECONDARY HYPERTENSION, UNSPECIFIED: ICD-10-CM

## 2019-12-30 DIAGNOSIS — I15.9 SECONDARY HYPERTENSION, UNSPECIFIED: ICD-10-CM

## 2019-12-30 RX ORDER — LISINOPRIL 20 MG/1
20 TABLET ORAL DAILY
Qty: 90 TAB | Refills: 3 | Status: SHIPPED | OUTPATIENT
Start: 2019-12-30 | End: 2020-12-18

## 2019-12-30 RX ORDER — PROPRANOLOL HYDROCHLORIDE 20 MG/1
20 TABLET ORAL DAILY
Qty: 90 TAB | Refills: 3 | Status: SHIPPED | OUTPATIENT
Start: 2019-12-30 | End: 2021-03-25

## 2019-12-30 RX ORDER — HYDROCHLOROTHIAZIDE 12.5 MG/1
12.5 CAPSULE, GELATIN COATED ORAL DAILY
Qty: 90 CAP | Refills: 3 | Status: SHIPPED | OUTPATIENT
Start: 2019-12-30 | End: 2020-08-10 | Stop reason: ALTCHOICE

## 2020-03-13 DIAGNOSIS — F41.9 ANXIETY: ICD-10-CM

## 2020-03-13 RX ORDER — LORAZEPAM 0.5 MG/1
0.5 TABLET ORAL
Qty: 30 TAB | Refills: 0 | Status: SHIPPED
Start: 2020-03-13 | End: 2020-04-12

## 2020-03-25 ENCOUNTER — APPOINTMENT (OUTPATIENT)
Dept: RADIOLOGY | Facility: MEDICAL CENTER | Age: 60
End: 2020-03-25
Attending: INTERNAL MEDICINE
Payer: COMMERCIAL

## 2020-07-09 RX ORDER — POTASSIUM CHLORIDE 750 MG/1
TABLET, EXTENDED RELEASE ORAL
Qty: 90 TAB | Refills: 3 | Status: SHIPPED | OUTPATIENT
Start: 2020-07-09 | End: 2022-04-18 | Stop reason: SDUPTHER

## 2020-07-29 DIAGNOSIS — F41.9 ANXIETY: ICD-10-CM

## 2020-07-29 RX ORDER — DEXLANSOPRAZOLE 60 MG/1
60 CAPSULE, DELAYED RELEASE ORAL DAILY
Qty: 30 CAP | Refills: 5 | Status: SHIPPED | OUTPATIENT
Start: 2020-07-29 | End: 2020-08-21

## 2020-07-29 RX ORDER — LORAZEPAM 0.5 MG/1
0.5 TABLET ORAL
Qty: 30 TAB | Refills: 0 | Status: SHIPPED
Start: 2020-07-29 | End: 2020-11-02 | Stop reason: SDUPTHER

## 2020-08-10 ENCOUNTER — OFFICE VISIT (OUTPATIENT)
Dept: INTERNAL MEDICINE | Facility: IMAGING CENTER | Age: 60
End: 2020-08-10
Payer: COMMERCIAL

## 2020-08-10 VITALS
HEART RATE: 76 BPM | DIASTOLIC BLOOD PRESSURE: 70 MMHG | OXYGEN SATURATION: 97 % | HEIGHT: 71 IN | RESPIRATION RATE: 14 BRPM | TEMPERATURE: 98.9 F | BODY MASS INDEX: 24.22 KG/M2 | WEIGHT: 173 LBS | SYSTOLIC BLOOD PRESSURE: 116 MMHG

## 2020-08-10 DIAGNOSIS — Z01.89 ROUTINE LAB DRAW: ICD-10-CM

## 2020-08-10 DIAGNOSIS — M25.532 WRIST PAIN, CHRONIC, LEFT: ICD-10-CM

## 2020-08-10 DIAGNOSIS — M79.645 CHRONIC THUMB PAIN, LEFT: ICD-10-CM

## 2020-08-10 DIAGNOSIS — G89.29 CHRONIC THUMB PAIN, LEFT: ICD-10-CM

## 2020-08-10 DIAGNOSIS — F41.1 GENERALIZED ANXIETY DISORDER: ICD-10-CM

## 2020-08-10 DIAGNOSIS — Z00.00 WELLNESS EXAMINATION: ICD-10-CM

## 2020-08-10 DIAGNOSIS — I10 ESSENTIAL HYPERTENSION: ICD-10-CM

## 2020-08-10 DIAGNOSIS — G89.29 WRIST PAIN, CHRONIC, LEFT: ICD-10-CM

## 2020-08-10 PROCEDURE — 99214 OFFICE O/P EST MOD 30 MIN: CPT | Performed by: INTERNAL MEDICINE

## 2020-08-10 RX ORDER — TRIAMCINOLONE ACETONIDE 1 MG/G
1 CREAM TOPICAL 2 TIMES DAILY PRN
COMMUNITY
End: 2022-11-01 | Stop reason: SDUPTHER

## 2020-08-10 RX ORDER — HYDROCHLOROTHIAZIDE 12.5 MG/1
12.5 TABLET ORAL DAILY
Qty: 90 TAB | Refills: 3 | Status: SHIPPED | OUTPATIENT
Start: 2020-08-10 | End: 2021-09-20

## 2020-08-10 ASSESSMENT — FIBROSIS 4 INDEX: FIB4 SCORE: 0.81

## 2020-08-10 NOTE — PROGRESS NOTES
Established Patient Note   HPI:        Mariza comes in today to follow up HTN and anxiety. She has been having left thumb and wrist pain; she would like to have referral to physiatrist.    Past Medical History:   Diagnosis Date   • Anxiety and depression 12/12/2018   • Cervicalgia    • Depression with anxiety    • Fibroid, uterine    • Gastroesophageal reflux disease without esophagitis 12/12/2018   • Generalized anxiety disorder 12/12/2018   • GERD (gastroesophageal reflux disease)    • Hypertension        Current Outpatient Medications   Medication Sig Dispense Refill   • hydroCHLOROthiazide (HYDRODIURIL) 12.5 MG tablet Take 1 Tab by mouth every day. 90 Tab 3   • LORazepam (ATIVAN) 0.5 MG Tab Take 1 Tab by mouth 1 time daily as needed for Anxiety for up to 30 days. 30 Tab 0   • potassium chloride SA (K-DUR) 10 MEQ Tab CR TAKE 1 TABLET BY MOUTH EVERY DAY 90 Tab 3   • lisinopril (PRINIVIL) 20 MG Tab Take 1 Tab by mouth every day. 90 Tab 3   • propranolol (INDERAL) 20 MG Tab Take 1 Tab by mouth every day. 90 Tab 3   • Diclofenac Sodium 1 % Gel Apply 1 g to skin as directed 3 times a day as needed. To affected area. 1 Tube 1   • Cholecalciferol (VITAMIN D3) 1000 units Cap Take 2,000 Units by mouth every day.     • Magnesium 200 MG Tab Take 200 mg by mouth.     • triamcinolone acetonide (KENALOG) 0.1 % Cream Apply 1 Application to affected area(s) 2 times a day as needed.     • Dexlansoprazole (DEXILANT) 60 MG CAPSULE DELAYED RELEASE delayed-release capsule Take 60 mg by mouth every day. (Patient not taking: Reported on 8/10/2020) 30 Cap 5     No current facility-administered medications for this visit.          Allergies   Allergen Reactions   • Flexeril [Cyclobenzaprine]      Insomnia         Social History     Tobacco Use   • Smoking status: Never Smoker   • Smokeless tobacco: Never Used   Substance Use Topics   • Alcohol use: Yes     Alcohol/week: 0.5 oz     Types: 1 drink(s) per week     Comment: rare   • Drug  "use: No       Past Surgical History:   Procedure Laterality Date   • HYSTERECTOMY, VAGINAL  2005        ROS    Ambulatory Vitals  /70 (BP Location: Left arm, Patient Position: Sitting, BP Cuff Size: Adult)   Pulse 76   Temp 37.2 °C (98.9 °F) (Temporal)   Resp 14   Ht 1.803 m (5' 11\")   Wt 78.5 kg (173 lb)   LMP 12/12/2005   SpO2 97%   BMI 24.13 kg/m²     Physical Exam   Constitutional: She is well-developed, well-nourished, and in no distress. No distress.   Cardiovascular: Normal rate, regular rhythm and normal heart sounds.   Pulmonary/Chest: Effort normal and breath sounds normal. She has no wheezes. She has no rales.   Musculoskeletal:         General: No edema.   Neurological: She is alert. No cranial nerve deficit. Gait normal. Coordination normal.   Skin: She is not diaphoretic.         Assessment and Plan:     1. Wrist pain, chronic, left  REFERRAL TO PHYSIATRY (PMR)   2. Chronic thumb pain, left  REFERRAL TO PHYSIATRY (PMR)   3. Essential hypertension  hydroCHLOROthiazide (HYDRODIURIL) 12.5 MG tablet   4. Generalized anxiety disorder     5. Routine lab draw  VITAMIN B12    CBC WITH DIFFERENTIAL    VITAMIN D,25 HYDROXY    URINALYSIS    Comp Metabolic Panel    Lipid Profile    TSH   6. Wellness examination  VITAMIN B12    CBC WITH DIFFERENTIAL    VITAMIN D,25 HYDROXY    URINALYSIS    Comp Metabolic Panel    Lipid Profile    TSH     She received refill for her ativan last week. Will refer to hand specialist to evaluate hand and wrist pain since I suspect she may have chronic tendonitis and possible arthritis. Will have her do blood work for general check.    Chaitanya Presley M.D.  "

## 2020-08-21 RX ORDER — DEXLANSOPRAZOLE 60 MG/1
CAPSULE, DELAYED RELEASE ORAL
Qty: 90 CAP | Refills: 2 | Status: SHIPPED | OUTPATIENT
Start: 2020-08-21 | End: 2022-01-31

## 2020-09-01 ENCOUNTER — NON-PROVIDER VISIT (OUTPATIENT)
Dept: INTERNAL MEDICINE | Facility: IMAGING CENTER | Age: 60
End: 2020-09-01
Payer: COMMERCIAL

## 2020-09-01 ENCOUNTER — HOSPITAL ENCOUNTER (OUTPATIENT)
Facility: MEDICAL CENTER | Age: 60
End: 2020-09-01
Payer: COMMERCIAL

## 2020-09-01 ENCOUNTER — HOSPITAL ENCOUNTER (OUTPATIENT)
Facility: MEDICAL CENTER | Age: 60
End: 2020-09-01
Attending: INTERNAL MEDICINE
Payer: COMMERCIAL

## 2020-09-01 DIAGNOSIS — Z01.89 ENCOUNTER FOR ROUTINE LABORATORY TESTING: ICD-10-CM

## 2020-09-01 DIAGNOSIS — Z00.00 WELLNESS EXAMINATION: ICD-10-CM

## 2020-09-01 DIAGNOSIS — Z01.89 ROUTINE LAB DRAW: ICD-10-CM

## 2020-09-01 LAB
25(OH)D3 SERPL-MCNC: 45 NG/ML (ref 30–100)
ALBUMIN SERPL BCP-MCNC: 4.7 G/DL (ref 3.2–4.9)
ALBUMIN/GLOB SERPL: 2 G/DL
ALP SERPL-CCNC: 70 U/L (ref 30–99)
ALT SERPL-CCNC: 17 U/L (ref 2–50)
ANION GAP SERPL CALC-SCNC: 12 MMOL/L (ref 7–16)
APPEARANCE UR: CLEAR
AST SERPL-CCNC: 12 U/L (ref 12–45)
BASOPHILS # BLD AUTO: 0.7 % (ref 0–1.8)
BASOPHILS # BLD: 0.04 K/UL (ref 0–0.12)
BDY FAT % MEASURED: 37.5 %
BILIRUB SERPL-MCNC: 0.9 MG/DL (ref 0.1–1.5)
BILIRUB UR QL STRIP.AUTO: NEGATIVE
BP DIAS: 60 MMHG
BP SYS: 100 MMHG
BUN SERPL-MCNC: 18 MG/DL (ref 8–22)
CALCIUM SERPL-MCNC: 9.8 MG/DL (ref 8.5–10.5)
CHLORIDE SERPL-SCNC: 96 MMOL/L (ref 96–112)
CHOLEST SERPL-MCNC: 214 MG/DL (ref 100–199)
CHOLEST SERPL-MCNC: 215 MG/DL (ref 100–199)
CO2 SERPL-SCNC: 26 MMOL/L (ref 20–33)
COLOR UR: YELLOW
CREAT SERPL-MCNC: 0.72 MG/DL (ref 0.5–1.4)
DIABETES HTDIA: NO
EOSINOPHIL # BLD AUTO: 0.07 K/UL (ref 0–0.51)
EOSINOPHIL NFR BLD: 1.2 % (ref 0–6.9)
ERYTHROCYTE [DISTWIDTH] IN BLOOD BY AUTOMATED COUNT: 43.9 FL (ref 35.9–50)
EVENT NAME HTEVT: NORMAL
FASTING HTFAS: YES
FASTING STATUS PATIENT QL REPORTED: NORMAL
GLOBULIN SER CALC-MCNC: 2.4 G/DL (ref 1.9–3.5)
GLUCOSE SERPL-MCNC: 87 MG/DL (ref 65–99)
GLUCOSE SERPL-MCNC: 88 MG/DL (ref 65–99)
GLUCOSE UR STRIP.AUTO-MCNC: NEGATIVE MG/DL
HCT VFR BLD AUTO: 45.5 % (ref 37–47)
HDLC SERPL-MCNC: 47 MG/DL
HDLC SERPL-MCNC: 47 MG/DL
HGB BLD-MCNC: 15.4 G/DL (ref 12–16)
HYPERTENSION HTHYP: YES
IMM GRANULOCYTES # BLD AUTO: 0.01 K/UL (ref 0–0.11)
IMM GRANULOCYTES NFR BLD AUTO: 0.2 % (ref 0–0.9)
KETONES UR STRIP.AUTO-MCNC: ABNORMAL MG/DL
LDLC SERPL CALC-MCNC: 139 MG/DL
LDLC SERPL CALC-MCNC: 139 MG/DL
LEUKOCYTE ESTERASE UR QL STRIP.AUTO: NEGATIVE
LYMPHOCYTES # BLD AUTO: 2.09 K/UL (ref 1–4.8)
LYMPHOCYTES NFR BLD: 37.3 % (ref 22–41)
MCH RBC QN AUTO: 33.3 PG (ref 27–33)
MCHC RBC AUTO-ENTMCNC: 33.8 G/DL (ref 33.6–35)
MCV RBC AUTO: 98.3 FL (ref 81.4–97.8)
MICRO URNS: ABNORMAL
MONOCYTES # BLD AUTO: 0.5 K/UL (ref 0–0.85)
MONOCYTES NFR BLD AUTO: 8.9 % (ref 0–13.4)
NEUTROPHILS # BLD AUTO: 2.9 K/UL (ref 2–7.15)
NEUTROPHILS NFR BLD: 51.7 % (ref 44–72)
NITRITE UR QL STRIP.AUTO: NEGATIVE
NRBC # BLD AUTO: 0 K/UL
NRBC BLD-RTO: 0 /100 WBC
PH UR STRIP.AUTO: 5.5 [PH] (ref 5–8)
PLATELET # BLD AUTO: 359 K/UL (ref 164–446)
PMV BLD AUTO: 10 FL (ref 9–12.9)
POTASSIUM SERPL-SCNC: 4.2 MMOL/L (ref 3.6–5.5)
PROT SERPL-MCNC: 7.1 G/DL (ref 6–8.2)
PROT UR QL STRIP: NEGATIVE MG/DL
RBC # BLD AUTO: 4.63 M/UL (ref 4.2–5.4)
RBC UR QL AUTO: NEGATIVE
SCREENING LOC CITY HTCIT: NORMAL
SCREENING LOC STATE HTSTA: NORMAL
SCREENING LOCATION HTLOC: NORMAL
SMOKING HTSMO: NO
SODIUM SERPL-SCNC: 134 MMOL/L (ref 135–145)
SP GR UR STRIP.AUTO: 1.03
SUBSCRIBER ID HTSID: NORMAL
TRIGL SERPL-MCNC: 142 MG/DL (ref 0–149)
TRIGL SERPL-MCNC: 144 MG/DL (ref 0–149)
TSH SERPL DL<=0.005 MIU/L-ACNC: 2.12 UIU/ML (ref 0.38–5.33)
UROBILINOGEN UR STRIP.AUTO-MCNC: 0.2 MG/DL
VIT B12 SERPL-MCNC: 694 PG/ML (ref 211–911)
WBC # BLD AUTO: 5.6 K/UL (ref 4.8–10.8)

## 2020-09-01 PROCEDURE — 85025 COMPLETE CBC W/AUTO DIFF WBC: CPT

## 2020-09-01 PROCEDURE — 82607 VITAMIN B-12: CPT

## 2020-09-01 PROCEDURE — 84443 ASSAY THYROID STIM HORMONE: CPT

## 2020-09-01 PROCEDURE — 80053 COMPREHEN METABOLIC PANEL: CPT

## 2020-09-01 PROCEDURE — 82306 VITAMIN D 25 HYDROXY: CPT

## 2020-09-01 PROCEDURE — S5190 WELLNESS ASSESSMENT BY NONPH: HCPCS

## 2020-09-01 PROCEDURE — 80061 LIPID PANEL: CPT

## 2020-09-01 PROCEDURE — 80061 LIPID PANEL: CPT | Mod: 91

## 2020-09-01 PROCEDURE — 81003 URINALYSIS AUTO W/O SCOPE: CPT

## 2020-09-01 PROCEDURE — 82947 ASSAY GLUCOSE BLOOD QUANT: CPT

## 2020-09-16 ENCOUNTER — OFFICE VISIT (OUTPATIENT)
Dept: PHYSICAL MEDICINE AND REHAB | Facility: MEDICAL CENTER | Age: 60
End: 2020-09-16
Payer: COMMERCIAL

## 2020-09-16 VITALS
SYSTOLIC BLOOD PRESSURE: 118 MMHG | OXYGEN SATURATION: 97 % | DIASTOLIC BLOOD PRESSURE: 70 MMHG | WEIGHT: 176.59 LBS | HEART RATE: 74 BPM | TEMPERATURE: 98 F | HEIGHT: 71 IN | BODY MASS INDEX: 24.72 KG/M2

## 2020-09-16 DIAGNOSIS — M25.532 LEFT WRIST PAIN: ICD-10-CM

## 2020-09-16 DIAGNOSIS — M65.4 TENOSYNOVITIS, DE QUERVAIN: ICD-10-CM

## 2020-09-16 DIAGNOSIS — M79.644 FINGER PAIN, RIGHT: ICD-10-CM

## 2020-09-16 PROCEDURE — 99204 OFFICE O/P NEW MOD 45 MIN: CPT | Performed by: PHYSICAL MEDICINE & REHABILITATION

## 2020-09-16 RX ORDER — LORAZEPAM 0.5 MG/1
0.5 TABLET ORAL
COMMUNITY
End: 2020-11-02

## 2020-09-16 ASSESSMENT — FIBROSIS 4 INDEX: FIB4 SCORE: 0.49

## 2020-09-16 ASSESSMENT — PAIN SCALES - GENERAL: PAINLEVEL: NO PAIN

## 2020-09-16 ASSESSMENT — PATIENT HEALTH QUESTIONNAIRE - PHQ9: CLINICAL INTERPRETATION OF PHQ2 SCORE: 0

## 2020-09-16 NOTE — PROGRESS NOTES
New patient note    Physiatry (physical medicine and  Rehabilitation), interventional spine and sports medicine    Date of Service: 09/16/2020    Chief complaint:   Chief Complaint   Patient presents with   • New Patient     Wrist pain       HISTORY    HPI: Mariza Steward 60 y.o. female who presents today for evaluation of left thumb pain.  From what she reports, this seemed to start after working with her dog on a leash.  It seems like this was not getting better.     She has seen a chiropractor.  Some adjustments have helped a little bit.  At this point, her wrist pain is non-acute.    Over this time, she has used wrist splints when walking her leash, it is a short thumb spica style brace.  This seems to help a little bit.      She does have some neck pain, but she does not report radicular symptoms.  No numbness or tingling in the left arm or hand.    Currently, pain is a 0/10 on the NRS, but is painful with some activities.  She is not taking medication for her pain at this time.       Medical records review:  I reviewed the note from the referring provider Chaitanya Presley M.D. dated 08/10/2020.  She was seen for left wrist and thumb pain, referred for physiatry referral, hydrodiuril 12.5mg for essential hypertension, HÉCTOR, routine lab screens.     Previous treatments:    Physical Therapy: No    Medications the patient is tried: None    Previous interventions: none    Previous surgeries to relieve the above pain:  none      ROS:   Red Flags ROS:   Fever, Chills, Sweats: Denies  Involuntary Weight Loss: Denies  Bladder Incontinence: Denies  Bowel Incontinence: Denies  Saddle Anesthesia: Denies    All other systems reviewed and negative.       PMHx:   Past Medical History:   Diagnosis Date   • Anxiety and depression 12/12/2018   • Cervicalgia    • Depression with anxiety    • Fibroid, uterine    • Gastroesophageal reflux disease without esophagitis 12/12/2018   • Generalized anxiety disorder 12/12/2018   •  GERD (gastroesophageal reflux disease)    • Hypertension        PSHx:   Past Surgical History:   Procedure Laterality Date   • HYSTERECTOMY, VAGINAL  2005       Family history   Family History   Problem Relation Age of Onset   • Hypertension Mother    • Stroke Mother    • Hyperlipidemia Mother    • Heart Disease Father         colon   • Colon Cancer Paternal Grandmother    • Cancer Paternal Aunt         breast   • Genetic Disorder Neg Hx    • Psychiatric Illness Neg Hx    • Thyroid Neg Hx    • Diabetes Neg Hx    • Dementia Neg Hx          Medications:   Current Outpatient Medications   Medication   • LORazepam (ATIVAN) 0.5 MG Tab   • DEXILANT 60 MG CAPSULE DELAYED RELEASE delayed-release capsule   • triamcinolone acetonide (KENALOG) 0.1 % Cream   • hydroCHLOROthiazide (HYDRODIURIL) 12.5 MG tablet   • potassium chloride SA (K-DUR) 10 MEQ Tab CR   • lisinopril (PRINIVIL) 20 MG Tab   • propranolol (INDERAL) 20 MG Tab   • Cholecalciferol (VITAMIN D3) 1000 units Cap   • Magnesium 200 MG Tab   • Diclofenac Sodium 1 % Gel     No current facility-administered medications for this visit.        Allergies:   Allergies   Allergen Reactions   • Flexeril [Cyclobenzaprine]      Insomnia       Social Hx:   Social History     Socioeconomic History   • Marital status: Single     Spouse name: Not on file   • Number of children: Not on file   • Years of education: Not on file   • Highest education level: Not on file   Occupational History   • Not on file   Social Needs   • Financial resource strain: Not on file   • Food insecurity     Worry: Not on file     Inability: Not on file   • Transportation needs     Medical: Not on file     Non-medical: Not on file   Tobacco Use   • Smoking status: Never Smoker   • Smokeless tobacco: Never Used   Substance and Sexual Activity   • Alcohol use: Yes     Alcohol/week: 0.5 oz     Types: 1 drink(s) per week     Comment: rare   • Drug use: No   • Sexual activity: Not on file   Lifestyle   • Physical  "activity     Days per week: Not on file     Minutes per session: Not on file   • Stress: Not on file   Relationships   • Social connections     Talks on phone: Not on file     Gets together: Not on file     Attends Catholic service: Not on file     Active member of club or organization: Not on file     Attends meetings of clubs or organizations: Not on file     Relationship status: Not on file   • Intimate partner violence     Fear of current or ex partner: Not on file     Emotionally abused: Not on file     Physically abused: Not on file     Forced sexual activity: Not on file   Other Topics Concern   •  Service No   • Blood Transfusions No   • Caffeine Concern No   • Occupational Exposure No   • Hobby Hazards No   • Sleep Concern Yes   • Stress Concern No   • Weight Concern Yes   • Special Diet No   • Back Care No   • Exercise No   • Bike Helmet No   • Seat Belt Yes   • Self-Exams No   Social History Narrative    .     Children: no.     Work: [employer],          EXAMINATION     Physical Exam:   Vitals: /70 (BP Location: Right arm, Patient Position: Sitting, BP Cuff Size: Large adult)   Pulse 74   Temp 36.7 °C (98 °F) (Temporal)   Ht 1.803 m (5' 11\")   Wt 80.1 kg (176 lb 9.4 oz)   SpO2 97%     Constitutional:   Body Habitus: Body mass index is 24.63 kg/m².  Cooperation: Fully cooperates with exam  Appearance: Well-groomed, well-nourished, not disheveled, in no acute distress    Eyes: No scleral icterus, no proptosis     ENT -no obvious auditory deficits, wearing a face mask    Skin -no rashes or lesions noted     Respiratory-  breathing comfortable on room air, no audible wheezing    Cardiovascular- capillary refills less than 2 seconds. No lower extremity edema is noted.     Psychiatric- alert and oriented ×3. Normal affect.     Gait - normal gait, no use of ambulatory device, nonantalgic.     Musculoskeletal -   Cervical spine   Inspection: No deformities of the skin over the " cervical spine. No rashes or lesions.    full  A/P ROM in all directions, without  pain      Spurling’s sign: negative bilaterally    No signs of muscular atrophy in bilateral upper extremities     Left wrist:   No pain with wrist compression  CMC joint tenderness on the left, negative grind test  Minimal pain with finkelstein's test.  Mild tenderness distal lateral wrist    Right middle finger without significant synovitis, small nodule noted dorsally over PIP    Thoracic/Lumbar Spine/Sacral Spine/Hips   Inspection: No evidence of atrophy in bilateral lower extremities throughout     Neuro     Key points for the international standards for neurological classification of spinal cord injury (ISNCSCI) to light touch.     Dermatome R L   C4 2 2   C5 2 2   C6 2 2   C7 2 2   C8 2 2   T1 2 2   T2 2 2         Motor Exam Upper Extremities   ? Myotome R L   Shoulder flexion C5 5 5   Elbow flexion C5 5 5   Wrist extension C6 5 5   Elbow extension C7 5 5   Finger flexion C8 5 5   Finger abduction T1 5 5       Alvarez’s sign negative bilaterally       Reflexes  ?  R L   Biceps  2+ 2+   Brachioradialis  2+ 2+   Patella  2+ 2+   Achilles   2+ 2+       MEDICAL DECISION MAKING    Medical records review: see under HPI section.     DATA    Labs:   Lab Results   Component Value Date/Time    SODIUM 134 (L) 09/01/2020 08:00 AM    POTASSIUM 4.2 09/01/2020 08:00 AM    CHLORIDE 96 09/01/2020 08:00 AM    CO2 26 09/01/2020 08:00 AM    ANION 12.0 09/01/2020 08:00 AM    GLUCOSE 88 09/01/2020 08:00 AM    GLUCOSE 87 09/01/2020 08:00 AM    BUN 18 09/01/2020 08:00 AM    CREATININE 0.72 09/01/2020 08:00 AM    CALCIUM 9.8 09/01/2020 08:00 AM    ASTSGOT 12 09/01/2020 08:00 AM    ALTSGPT 17 09/01/2020 08:00 AM    TBILIRUBIN 0.9 09/01/2020 08:00 AM    ALBUMIN 4.7 09/01/2020 08:00 AM    TOTPROTEIN 7.1 09/01/2020 08:00 AM    GLOBULIN 2.4 09/01/2020 08:00 AM    AGRATIO 2.0 09/01/2020 08:00 AM       No results found for: PROTHROMBTM, INR     Lab Results    Component Value Date/Time    WBC 5.6 09/01/2020 08:00 AM    RBC 4.63 09/01/2020 08:00 AM    HEMOGLOBIN 15.4 09/01/2020 08:00 AM    HEMATOCRIT 45.5 09/01/2020 08:00 AM    MCV 98.3 (H) 09/01/2020 08:00 AM    MCH 33.3 (H) 09/01/2020 08:00 AM    MCHC 33.8 09/01/2020 08:00 AM    MPV 10.0 09/01/2020 08:00 AM    NEUTSPOLYS 51.70 09/01/2020 08:00 AM    LYMPHOCYTES 37.30 09/01/2020 08:00 AM    MONOCYTES 8.90 09/01/2020 08:00 AM    EOSINOPHILS 1.20 09/01/2020 08:00 AM    BASOPHILS 0.70 09/01/2020 08:00 AM        Lab Results   Component Value Date/Time    HBA1C 5.3 02/09/2018 07:34 AM        Imaging: I personally reviewed following images, these are my reads  NONE AVAILABLE    IMAGING radiology reads. I reviewed the following radiology reads NONE AVAILABLE                                                                                                                                                                                Diagnosis   Visit Diagnoses     ICD-10-CM   1. Left wrist pain  M25.532   2. Tenosynovitis, de Quervain  M65.4   3. Finger pain, right  M79.644           ASSESSMENT:  Mariza Steward 60 y.o. female seen for above     Mariza was seen today for new patient.    Diagnoses and all orders for this visit:    Left wrist pain  -     DX-WRIST-COMPLETE 3+ LEFT; Future    Tenosynovitis, de Quervain  -     DX-WRIST-COMPLETE 3+ LEFT; Future    Finger pain, right  -     DX-FINGER(S) 2+ RIGHT; Future      1. Discussed that her exam suggests that she may have CMC joint arthritis and symptoms of de Quervain's tenosynovitis.  Discussed xrays of the left wrist.  More supportive left thumb spica splint given today as it will provide more stability and joint rest.  Directed her to stretches for her left wrist.  2. Xray of right finger ordered.    Follow-up: Return in about 5 weeks (around 10/21/2020).    Thank you very much for asking me to participate in Mariza Steward's care.  Please contact me with any  questions or concerns.    Please note that this dictation was created using voice recognition software. I have made every reasonable attempt to correct obvious errors but there may be errors of grammar and content that I may have overlooked prior to finalization of this note.      Kofi Anderson MD  Physical Medicine and Rehabilitation  Interventional Spine and Sports Physiatry  [facility]         Chaitanya Muñoz M.D.

## 2020-09-21 ENCOUNTER — IMMUNIZATION (OUTPATIENT)
Dept: OCCUPATIONAL MEDICINE | Facility: CLINIC | Age: 60
End: 2020-09-21

## 2020-09-21 DIAGNOSIS — Z23 NEED FOR VACCINATION: ICD-10-CM

## 2020-09-21 PROCEDURE — 90686 IIV4 VACC NO PRSV 0.5 ML IM: CPT | Performed by: PREVENTIVE MEDICINE

## 2020-09-28 ENCOUNTER — APPOINTMENT (OUTPATIENT)
Dept: LAB | Facility: MEDICAL CENTER | Age: 60
End: 2020-09-28
Payer: COMMERCIAL

## 2020-10-15 ENCOUNTER — APPOINTMENT (OUTPATIENT)
Dept: RADIOLOGY | Facility: MEDICAL CENTER | Age: 60
End: 2020-10-15
Attending: INTERNAL MEDICINE
Payer: COMMERCIAL

## 2020-10-21 ENCOUNTER — APPOINTMENT (OUTPATIENT)
Dept: PHYSICAL MEDICINE AND REHAB | Facility: MEDICAL CENTER | Age: 60
End: 2020-10-21
Payer: COMMERCIAL

## 2020-11-02 DIAGNOSIS — F41.9 ANXIETY: ICD-10-CM

## 2020-11-02 RX ORDER — LORAZEPAM 0.5 MG/1
0.5 TABLET ORAL
Qty: 30 TAB | Refills: 0 | Status: SHIPPED
Start: 2020-11-02 | End: 2021-03-29 | Stop reason: SDUPTHER

## 2020-11-21 ENCOUNTER — HOSPITAL ENCOUNTER (OUTPATIENT)
Facility: MEDICAL CENTER | Age: 60
End: 2020-11-21
Attending: EMERGENCY MEDICINE
Payer: COMMERCIAL

## 2020-11-23 LAB
AMBIGUOUS DTTM AMBI4: NORMAL
COVID ORDER STATUS COVID19: NORMAL
SARS-COV-2 RNA RESP QL NAA+PROBE: NOTDETECTED
SPECIMEN SOURCE: NORMAL

## 2020-12-18 DIAGNOSIS — I15.9 SECONDARY HYPERTENSION, UNSPECIFIED: ICD-10-CM

## 2020-12-18 RX ORDER — LISINOPRIL 20 MG/1
TABLET ORAL
Qty: 90 TAB | Refills: 3 | Status: SHIPPED | OUTPATIENT
Start: 2020-12-18 | End: 2021-12-20

## 2020-12-21 ENCOUNTER — HOSPITAL ENCOUNTER (OUTPATIENT)
Dept: LAB | Facility: MEDICAL CENTER | Age: 60
End: 2020-12-21
Attending: EMERGENCY MEDICINE
Payer: COMMERCIAL

## 2020-12-21 LAB
COVID ORDER STATUS COVID19: NORMAL
SARS-COV-2 RNA RESP QL NAA+PROBE: NOTDETECTED
SPECIMEN SOURCE: NORMAL

## 2020-12-31 ENCOUNTER — IMMUNIZATION (OUTPATIENT)
Dept: FAMILY PLANNING/WOMEN'S HEALTH CLINIC | Facility: IMMUNIZATION CENTER | Age: 60
End: 2020-12-31
Attending: FAMILY MEDICINE
Payer: COMMERCIAL

## 2020-12-31 DIAGNOSIS — Z23 NEED FOR VACCINATION: ICD-10-CM

## 2020-12-31 DIAGNOSIS — Z23 ENCOUNTER FOR VACCINATION: Primary | ICD-10-CM

## 2020-12-31 PROCEDURE — 91301 MODERNA SARS-COV-2 VACCINE: CPT

## 2020-12-31 PROCEDURE — 0011A MODERNA SARS-COV-2 VACCINE: CPT

## 2021-01-26 ENCOUNTER — APPOINTMENT (OUTPATIENT)
Dept: INTERNAL MEDICINE | Facility: IMAGING CENTER | Age: 61
End: 2021-01-26
Payer: COMMERCIAL

## 2021-01-27 ENCOUNTER — NON-PROVIDER VISIT (OUTPATIENT)
Dept: INTERNAL MEDICINE | Facility: IMAGING CENTER | Age: 61
End: 2021-01-27
Payer: COMMERCIAL

## 2021-01-27 ENCOUNTER — HOSPITAL ENCOUNTER (OUTPATIENT)
Facility: MEDICAL CENTER | Age: 61
End: 2021-01-27
Attending: INTERNAL MEDICINE
Payer: COMMERCIAL

## 2021-01-27 DIAGNOSIS — R30.0 DYSURIA: ICD-10-CM

## 2021-01-27 LAB
APPEARANCE UR: CLEAR
BILIRUB UR STRIP-MCNC: NEGATIVE MG/DL
COLOR UR AUTO: YELLOW
GLUCOSE UR STRIP.AUTO-MCNC: NEGATIVE MG/DL
KETONES UR STRIP.AUTO-MCNC: NEGATIVE MG/DL
LEUKOCYTE ESTERASE UR QL STRIP.AUTO: NORMAL
NITRITE UR QL STRIP.AUTO: NEGATIVE
PH UR STRIP.AUTO: 6 [PH] (ref 5–8)
PROT UR QL STRIP: NEGATIVE MG/DL
RBC UR QL AUTO: NORMAL
SP GR UR STRIP.AUTO: >1.03
UROBILINOGEN UR STRIP-MCNC: 0.2 MG/DL

## 2021-01-27 PROCEDURE — 87077 CULTURE AEROBIC IDENTIFY: CPT | Mod: 91

## 2021-01-27 PROCEDURE — 81002 URINALYSIS NONAUTO W/O SCOPE: CPT | Performed by: INTERNAL MEDICINE

## 2021-01-27 PROCEDURE — 87186 SC STD MICRODIL/AGAR DIL: CPT

## 2021-01-27 PROCEDURE — 87086 URINE CULTURE/COLONY COUNT: CPT

## 2021-01-27 RX ORDER — SULFAMETHOXAZOLE AND TRIMETHOPRIM 800; 160 MG/1; MG/1
1 TABLET ORAL 2 TIMES DAILY
Qty: 14 TAB | Refills: 0 | Status: SHIPPED | OUTPATIENT
Start: 2021-01-27 | End: 2021-02-03

## 2021-01-29 LAB
BACTERIA UR CULT: ABNORMAL
BACTERIA UR CULT: ABNORMAL
SIGNIFICANT IND 70042: ABNORMAL
SITE SITE: ABNORMAL
SOURCE SOURCE: ABNORMAL

## 2021-01-29 PROCEDURE — 0012A MODERNA SARS-COV-2 VACCINE: CPT

## 2021-01-29 PROCEDURE — 91301 MODERNA SARS-COV-2 VACCINE: CPT

## 2021-01-30 ENCOUNTER — IMMUNIZATION (OUTPATIENT)
Dept: FAMILY PLANNING/WOMEN'S HEALTH CLINIC | Facility: IMMUNIZATION CENTER | Age: 61
End: 2021-01-30
Payer: COMMERCIAL

## 2021-01-30 DIAGNOSIS — Z23 ENCOUNTER FOR VACCINATION: Primary | ICD-10-CM

## 2021-02-12 ENCOUNTER — HOSPITAL ENCOUNTER (OUTPATIENT)
Facility: MEDICAL CENTER | Age: 61
End: 2021-02-12
Attending: INTERNAL MEDICINE
Payer: COMMERCIAL

## 2021-02-12 ENCOUNTER — NON-PROVIDER VISIT (OUTPATIENT)
Dept: INTERNAL MEDICINE | Facility: IMAGING CENTER | Age: 61
End: 2021-02-12
Payer: COMMERCIAL

## 2021-02-12 DIAGNOSIS — R30.0 DYSURIA: ICD-10-CM

## 2021-02-12 DIAGNOSIS — R39.15 URINARY URGENCY: ICD-10-CM

## 2021-02-12 LAB
APPEARANCE UR: NORMAL
BILIRUB UR STRIP-MCNC: NEGATIVE MG/DL
COLOR UR AUTO: YELLOW
GLUCOSE UR STRIP.AUTO-MCNC: NEGATIVE MG/DL
KETONES UR STRIP.AUTO-MCNC: NEGATIVE MG/DL
LEUKOCYTE ESTERASE UR QL STRIP.AUTO: NEGATIVE
NITRITE UR QL STRIP.AUTO: NEGATIVE
PH UR STRIP.AUTO: 6.5 [PH] (ref 5–8)
PROT UR QL STRIP: NEGATIVE MG/DL
RBC UR QL AUTO: NEGATIVE
SP GR UR STRIP.AUTO: 1.02
UROBILINOGEN UR STRIP-MCNC: 0.2 MG/DL

## 2021-02-12 PROCEDURE — 81002 URINALYSIS NONAUTO W/O SCOPE: CPT | Performed by: INTERNAL MEDICINE

## 2021-02-12 PROCEDURE — 87086 URINE CULTURE/COLONY COUNT: CPT

## 2021-02-12 RX ORDER — CIPROFLOXACIN 250 MG/1
250 TABLET, FILM COATED ORAL 2 TIMES DAILY
Qty: 10 TABLET | Refills: 0 | Status: SHIPPED | OUTPATIENT
Start: 2021-02-12 | End: 2021-02-17

## 2021-02-15 LAB
BACTERIA UR CULT: NORMAL
SIGNIFICANT IND 70042: NORMAL
SITE SITE: NORMAL
SOURCE SOURCE: NORMAL

## 2021-02-16 ENCOUNTER — TELEPHONE (OUTPATIENT)
Dept: INTERNAL MEDICINE | Facility: IMAGING CENTER | Age: 61
End: 2021-02-16

## 2021-02-16 DIAGNOSIS — M54.50 ACUTE BILATERAL LOW BACK PAIN WITHOUT SCIATICA: ICD-10-CM

## 2021-02-16 DIAGNOSIS — M62.830 MUSCLE SPASM OF BACK: ICD-10-CM

## 2021-02-16 NOTE — TELEPHONE ENCOUNTER
Twisted low back early 2/14 putting on her dog's collar.  Now c/o non radiating LBP with muscle spasm.  Patient has experienced similar symptoms in the past.  Denies bowel or bladder involvement.  Referral mad to physical therapy at patient's request.

## 2021-02-18 ENCOUNTER — TELEPHONE (OUTPATIENT)
Dept: INTERNAL MEDICINE | Facility: IMAGING CENTER | Age: 61
End: 2021-02-18

## 2021-02-18 RX ORDER — METHYLPREDNISOLONE 4 MG/1
TABLET ORAL
Qty: 21 TABLET | Refills: 0 | Status: SHIPPED | OUTPATIENT
Start: 2021-02-18 | End: 2022-01-31

## 2021-02-18 NOTE — TELEPHONE ENCOUNTER
Patient wrenched her back putting a leash on her dog 2/14/2021.  She continues to have non radiating low back pain with muscle spasm not responding to home therapies.  She is requesting a medrol dose pack which helped in Oct, 2019.

## 2021-02-25 ENCOUNTER — APPOINTMENT (OUTPATIENT)
Dept: PHYSICAL THERAPY | Facility: REHABILITATION | Age: 61
End: 2021-02-25
Attending: INTERNAL MEDICINE
Payer: COMMERCIAL

## 2021-03-02 ENCOUNTER — APPOINTMENT (OUTPATIENT)
Dept: PHYSICAL THERAPY | Facility: REHABILITATION | Age: 61
End: 2021-03-02
Attending: INTERNAL MEDICINE
Payer: COMMERCIAL

## 2021-03-04 ENCOUNTER — APPOINTMENT (OUTPATIENT)
Dept: PHYSICAL THERAPY | Facility: REHABILITATION | Age: 61
End: 2021-03-04
Payer: COMMERCIAL

## 2021-03-09 ENCOUNTER — APPOINTMENT (OUTPATIENT)
Dept: PHYSICAL THERAPY | Facility: REHABILITATION | Age: 61
End: 2021-03-09
Payer: COMMERCIAL

## 2021-03-15 ENCOUNTER — APPOINTMENT (OUTPATIENT)
Dept: PHYSICAL THERAPY | Facility: REHABILITATION | Age: 61
End: 2021-03-15
Payer: COMMERCIAL

## 2021-03-17 ENCOUNTER — APPOINTMENT (OUTPATIENT)
Dept: PHYSICAL THERAPY | Facility: REHABILITATION | Age: 61
End: 2021-03-17
Payer: COMMERCIAL

## 2021-03-24 DIAGNOSIS — I15.9 SECONDARY HYPERTENSION, UNSPECIFIED: ICD-10-CM

## 2021-03-25 RX ORDER — PROPRANOLOL HYDROCHLORIDE 20 MG/1
TABLET ORAL
Qty: 90 TABLET | Refills: 3 | Status: SHIPPED | OUTPATIENT
Start: 2021-03-25 | End: 2022-03-10 | Stop reason: SDUPTHER

## 2021-03-29 DIAGNOSIS — F41.9 ANXIETY: ICD-10-CM

## 2021-03-29 RX ORDER — LORAZEPAM 0.5 MG/1
0.5 TABLET ORAL
Qty: 30 TABLET | Refills: 0 | Status: SHIPPED | OUTPATIENT
Start: 2021-03-29 | End: 2021-09-02 | Stop reason: SDUPTHER

## 2021-07-14 ENCOUNTER — PATIENT MESSAGE (OUTPATIENT)
Dept: INTERNAL MEDICINE | Facility: IMAGING CENTER | Age: 61
End: 2021-07-14

## 2021-07-14 DIAGNOSIS — M54.42 ACUTE BILATERAL LOW BACK PAIN WITH LEFT-SIDED SCIATICA: ICD-10-CM

## 2021-07-14 RX ORDER — CARISOPRODOL 350 MG/1
350 TABLET ORAL EVERY 8 HOURS PRN
Qty: 30 TABLET | Refills: 0 | Status: SHIPPED | OUTPATIENT
Start: 2021-07-14 | End: 2021-08-13

## 2021-07-14 NOTE — PATIENT COMMUNICATION
Patient reports that she developed acute on chronic LBP after an awkward bend/turn yesterday.  She is visiting her parents in [place].  She is using ice, advil, & tylenol.  She has made an appointment with her physiatrist when she returns.  She requests Soma rx which she has used in the past with relief.

## 2021-07-27 ENCOUNTER — OFFICE VISIT (OUTPATIENT)
Dept: PHYSICAL MEDICINE AND REHAB | Facility: MEDICAL CENTER | Age: 61
End: 2021-07-27
Payer: COMMERCIAL

## 2021-07-27 VITALS
TEMPERATURE: 98.1 F | WEIGHT: 167.99 LBS | HEART RATE: 69 BPM | HEIGHT: 71 IN | SYSTOLIC BLOOD PRESSURE: 124 MMHG | OXYGEN SATURATION: 98 % | BODY MASS INDEX: 23.52 KG/M2 | DIASTOLIC BLOOD PRESSURE: 62 MMHG

## 2021-07-27 DIAGNOSIS — M53.3 SACROILIAC JOINT DYSFUNCTION: ICD-10-CM

## 2021-07-27 DIAGNOSIS — M54.50 LUMBOSACRAL PAIN: ICD-10-CM

## 2021-07-27 PROCEDURE — 99214 OFFICE O/P EST MOD 30 MIN: CPT | Performed by: PHYSICAL MEDICINE & REHABILITATION

## 2021-07-27 ASSESSMENT — PAIN SCALES - GENERAL: PAINLEVEL: NO PAIN

## 2021-07-27 ASSESSMENT — FIBROSIS 4 INDEX: FIB4 SCORE: 0.5

## 2021-07-27 ASSESSMENT — PATIENT HEALTH QUESTIONNAIRE - PHQ9: CLINICAL INTERPRETATION OF PHQ2 SCORE: 0

## 2021-07-27 NOTE — PROGRESS NOTES
Follow-up patient note    Physiatry (physical medicine and  Rehabilitation), interventional spine and sports medicine    Date of Service: 07/27/2021    Chief complaint:   Chief Complaint   Patient presents with   • Follow-Up     Low back pain       HISTORY    HPI: Mariza Steward 61 y.o. female who presents today evaluation of low back pain.    From what she reports, she had an episode of back pain that started in 2015.  This occurred when she was rotating and bending to reach behind her.     This has occurred intermittently since then, about 4 times.  These episodes seem to occur with bending and rotation.  This has now happened twice this year.  It is incapacitating when this occurs.  It is difficult to sit or stand when this happens.      Last occurrence 7/13/2021.  She was reaching and rotating with garbage bags.  Did a bunch of yardwork the day prior, woke up with some pain in her back.  Walking usually 2-3 miles a day with her dog.    So far, she has been icing this.  In the past, she tried flexeril caused insomnia.  Advil, tylenol, ice.  Soma this episode.    No numbness or tingling in her feet, but she felt like maybe there was some numbness in the bottom of her feet.  No bowel or bladder issues    Saw PT last visit. [facility], Yessi Ellis    Medical records review:  I reviewed the note from the referring provider Chaitanya Presley M.D. dated 08/10/2020.  She was seen for left wrist and thumb pain, referred for physiatry referral, hydrodiuril 12.5mg for essential hypertension, HÉCTOR, routine lab screens.     Previous treatments:    Physical Therapy: Yes    Medications the patient is tried: flexeril, soma, advil.    Previous interventions: none    Previous surgeries to relieve the above pain:  none      ROS:   Red Flags ROS:   Fever, Chills, Sweats: Denies  Involuntary Weight Loss: Denies  Bladder Incontinence: Denies  Bowel Incontinence: Denies  Saddle Anesthesia: Denies    All other systems  reviewed and negative.       PMHx:   Past Medical History:   Diagnosis Date   • Anxiety and depression 12/12/2018   • Cervicalgia    • Depression with anxiety    • Fibroid, uterine    • Gastroesophageal reflux disease without esophagitis 12/12/2018   • Generalized anxiety disorder 12/12/2018   • GERD (gastroesophageal reflux disease)    • Hypertension        PSHx:   Past Surgical History:   Procedure Laterality Date   • HYSTERECTOMY, VAGINAL  2005       Family history   Family History   Problem Relation Age of Onset   • Hypertension Mother    • Stroke Mother    • Hyperlipidemia Mother    • Heart Disease Father         colon   • Colon Cancer Paternal Grandmother    • Cancer Paternal Aunt         breast   • Genetic Disorder Neg Hx    • Psychiatric Illness Neg Hx    • Thyroid Neg Hx    • Diabetes Neg Hx    • Dementia Neg Hx          Medications:   Current Outpatient Medications   Medication   • carisoprodol (SOMA) 350 MG Tab   • propranolol (INDERAL) 20 MG Tab   • methylPREDNISolone (MEDROL DOSEPAK) 4 MG Tablet Therapy Pack   • lisinopril (PRINIVIL) 20 MG Tab   • DEXILANT 60 MG CAPSULE DELAYED RELEASE delayed-release capsule   • triamcinolone acetonide (KENALOG) 0.1 % Cream   • hydroCHLOROthiazide (HYDRODIURIL) 12.5 MG tablet   • potassium chloride SA (K-DUR) 10 MEQ Tab CR   • Cholecalciferol (VITAMIN D3) 1000 units Cap   • Magnesium 200 MG Tab   • Diclofenac Sodium 1 % Gel     No current facility-administered medications for this visit.       Allergies:   Allergies   Allergen Reactions   • Flexeril [Cyclobenzaprine]      Insomnia       Social Hx:   Social History     Socioeconomic History   • Marital status: Single     Spouse name: Not on file   • Number of children: Not on file   • Years of education: Not on file   • Highest education level: Not on file   Occupational History   • Not on file   Tobacco Use   • Smoking status: Never Smoker   • Smokeless tobacco: Never Used   Substance and Sexual Activity   • Alcohol  "use: Yes     Alcohol/week: 0.5 oz     Types: 1 drink(s) per week     Comment: rare   • Drug use: No   • Sexual activity: Not on file   Other Topics Concern   •  Service No   • Blood Transfusions No   • Caffeine Concern No   • Occupational Exposure No   • Hobby Hazards No   • Sleep Concern Yes   • Stress Concern No   • Weight Concern Yes   • Special Diet No   • Back Care No   • Exercise No   • Bike Helmet No   • Seat Belt Yes   • Self-Exams No   Social History Narrative    .     Children: no.     Work: [employer],      Social Determinants of Health     Financial Resource Strain:    • Difficulty of Paying Living Expenses:    Food Insecurity:    • Worried About Running Out of Food in the Last Year:    • Ran Out of Food in the Last Year:    Transportation Needs:    • Lack of Transportation (Medical):    • Lack of Transportation (Non-Medical):    Physical Activity:    • Days of Exercise per Week:    • Minutes of Exercise per Session:    Stress:    • Feeling of Stress :    Social Connections:    • Frequency of Communication with Friends and Family:    • Frequency of Social Gatherings with Friends and Family:    • Attends Faith Services:    • Active Member of Clubs or Organizations:    • Attends Club or Organization Meetings:    • Marital Status:    Intimate Partner Violence:    • Fear of Current or Ex-Partner:    • Emotionally Abused:    • Physically Abused:    • Sexually Abused:          EXAMINATION     Physical Exam:   Vitals: /62 (BP Location: Right arm, Patient Position: Sitting, BP Cuff Size: Small adult)   Pulse 69   Temp 36.7 °C (98.1 °F) (Temporal)   Ht 1.803 m (5' 11\")   Wt 76.2 kg (167 lb 15.9 oz)   SpO2 98%     Constitutional:   Body Habitus: Body mass index is 23.43 kg/m².  Cooperation: Fully cooperates with exam  Appearance: Well-groomed, well-nourished, not disheveled, in no acute distress    Eyes: No scleral icterus, no proptosis     ENT -no obvious auditory deficits, " wearing a face mask    Skin -no rashes or lesions noted     Respiratory-  breathing comfortable on room air, no audible wheezing    Cardiovascular- capillary refills less than 2 seconds. No lower extremity edema is noted.     Psychiatric- alert and oriented ×3. Normal affect.     Gait - normal gait, no use of ambulatory device, nonantalgic.     Musculoskeletal -     Thoracic/Lumbar Spine/Sacral Spine/Hips   Inspection: No evidence of atrophy in bilateral lower extremities throughout     Neuro     No focal motor or sensory deficits in the lower extremities bilaterally  SLR is negative bilaterally  Katy's test is negative bilaterally  Pelvic crest is not tender, mild upslip innominate  Stork test is positive on the left.  Forward flexion test is positive on the left    Reflexes  ?  R L   Patella  2+ 2+   Achilles   2+ 2+       MEDICAL DECISION MAKING    Medical records review: see under HPI section.     DATA    Labs:   Lab Results   Component Value Date/Time    SODIUM 134 (L) 09/01/2020 08:00 AM    POTASSIUM 4.2 09/01/2020 08:00 AM    CHLORIDE 96 09/01/2020 08:00 AM    CO2 26 09/01/2020 08:00 AM    ANION 12.0 09/01/2020 08:00 AM    GLUCOSE 88 09/01/2020 08:00 AM    GLUCOSE 87 09/01/2020 08:00 AM    BUN 18 09/01/2020 08:00 AM    CREATININE 0.72 09/01/2020 08:00 AM    CALCIUM 9.8 09/01/2020 08:00 AM    ASTSGOT 12 09/01/2020 08:00 AM    ALTSGPT 17 09/01/2020 08:00 AM    TBILIRUBIN 0.9 09/01/2020 08:00 AM    ALBUMIN 4.7 09/01/2020 08:00 AM    TOTPROTEIN 7.1 09/01/2020 08:00 AM    GLOBULIN 2.4 09/01/2020 08:00 AM    AGRATIO 2.0 09/01/2020 08:00 AM       No results found for: PROTHROMBTM, INR     Lab Results   Component Value Date/Time    WBC 5.6 09/01/2020 08:00 AM    RBC 4.63 09/01/2020 08:00 AM    HEMOGLOBIN 15.4 09/01/2020 08:00 AM    HEMATOCRIT 45.5 09/01/2020 08:00 AM    MCV 98.3 (H) 09/01/2020 08:00 AM    MCH 33.3 (H) 09/01/2020 08:00 AM    MCHC 33.8 09/01/2020 08:00 AM    MPV 10.0 09/01/2020 08:00 AM    NEUTSPOLYS  51.70 09/01/2020 08:00 AM    LYMPHOCYTES 37.30 09/01/2020 08:00 AM    MONOCYTES 8.90 09/01/2020 08:00 AM    EOSINOPHILS 1.20 09/01/2020 08:00 AM    BASOPHILS 0.70 09/01/2020 08:00 AM        Lab Results   Component Value Date/Time    HBA1C 5.3 02/09/2018 07:34 AM        Imaging: I personally reviewed following images, these are my reads  NONE AVAILABLE    IMAGING radiology reads. I reviewed the following radiology reads NONE AVAILABLE                                                                                                                                                                                Diagnosis   Visit Diagnoses     ICD-10-CM   1. Lumbosacral pain  M54.5   2. Sacroiliac joint dysfunction  M53.3           ASSESSMENT:  Mariza Steward 61 y.o. female seen for above     Mariza was seen today for follow-up.    Diagnoses and all orders for this visit:    Lumbosacral pain  -     DX-LUMBAR SPINE-2 OR 3 VIEWS; Future  -     DX-SACRUM AND COCCYX 2+; Future  -     REFERRAL TO PHYSICAL THERAPY    Sacroiliac joint dysfunction  -     REFERRAL TO PHYSICAL THERAPY       1. X-rays of the lumbar spine and sacrum ordered  2. Discussed plan for referral to physical therapy. Work on home exercise program, develop HEP and work on lumbar stabilization.    Follow-up: Return in about 2 months (around 9/27/2021).    My total time spent caring for the patient on the day of the encounter was 30-39 minutes.     Thank you very much for asking me to participate in Mariza Steward's care.  Please contact me with any questions or concerns.    Please note that this dictation was created using voice recognition software. I have made every reasonable attempt to correct obvious errors but there may be errors of grammar and content that I may have overlooked prior to finalization of this note.      Kofi Anderson MD  Physical Medicine and Rehabilitation  Interventional Spine and Sports Physiatry  [facility]          Chaitanya Muñoz M.D.

## 2021-08-05 ENCOUNTER — HOSPITAL ENCOUNTER (OUTPATIENT)
Dept: RADIOLOGY | Facility: MEDICAL CENTER | Age: 61
End: 2021-08-05
Attending: PHYSICAL MEDICINE & REHABILITATION
Payer: COMMERCIAL

## 2021-08-05 DIAGNOSIS — M54.50 LUMBOSACRAL PAIN: ICD-10-CM

## 2021-08-05 PROCEDURE — 72100 X-RAY EXAM L-S SPINE 2/3 VWS: CPT

## 2021-08-05 PROCEDURE — 72220 X-RAY EXAM SACRUM TAILBONE: CPT

## 2021-08-11 ENCOUNTER — PHYSICAL THERAPY (OUTPATIENT)
Dept: PHYSICAL THERAPY | Facility: REHABILITATION | Age: 61
End: 2021-08-11
Attending: PHYSICAL MEDICINE & REHABILITATION
Payer: COMMERCIAL

## 2021-08-11 DIAGNOSIS — M54.50 LUMBOSACRAL PAIN: ICD-10-CM

## 2021-08-11 DIAGNOSIS — M53.3 SACROILIAC JOINT DYSFUNCTION: ICD-10-CM

## 2021-08-11 PROCEDURE — 97110 THERAPEUTIC EXERCISES: CPT

## 2021-08-11 PROCEDURE — 97161 PT EVAL LOW COMPLEX 20 MIN: CPT

## 2021-08-11 ASSESSMENT — ENCOUNTER SYMPTOMS
PAIN SCALE AT LOWEST: 0
PAIN SCALE: 2
PAIN SCALE AT HIGHEST: 3

## 2021-08-11 NOTE — OP THERAPY EVALUATION
"  Outpatient Physical Therapy  INITIAL EVALUATION    [facility] Physical Therapy [address].  [address]  [place] [address]  Phone:  [phone]  Fax:  [phone]    Date of Evaluation: 08/11/2021    Patient: Mariza Steward  YOB: 1960  MRN: [id number]     Referring Provider: Kofi Anderson M.D.  [address]  [address]  [place] [address]   Referring Diagnosis Lumbosacral pain [M54.5];Sacroiliac joint dysfunction [M53.3]     Time Calculation  Start time: 1302  Stop time: 1400 Time Calculation (min): 58 minutes         Chief Complaint: Back Problem    Visit Diagnoses     ICD-10-CM   1. Lumbosacral pain  M54.5   2. Sacroiliac joint dysfunction  M53.3       Date of onset of impairment: No data found    Subjective:   History of Present Illness:     Mechanism of injury:    Pt presents to PT with complaint of LBP. Per Dr. Ardon last office visit note on 7/27/21:     \"From what she reports, she had an episode of back pain that started in 2015.  This occurred when she was rotating and bending to reach behind her.   This has occurred intermittently since then, about 4 times.  These episodes seem to occur with bending and rotation.  This has now happened twice this year.  It is incapacitating when this occurs.  It is difficult to sit or stand when this happens.     Last occurrence 7/13/2021.  She was reaching and rotating with garbage bags.  Did a bunch of yardwork the day prior, woke up with some pain in her back.  Walking usually 2-3 miles a day with her dog.   So far, she has been icing this.  In the past, she tried flexeril caused insomnia.  Advil, tylenol, ice.  Soma this episode.   No numbness or tingling in her feet, but she felt like maybe there was some numbness in the bottom of her feet.  No bowel or bladder issues   Has seen PT:  [facility], Yessi Nolen\"    Pt reports her symptoms from her most recent episode to be ongoing. 70% improved from the day of " "injury, but persistent aching across the lower back, which is worse in the mornings.  \"I feel guarded when I bend over. It gives me the sense that it is going to happen again.\" Endorses some aching down the posterior thigh and calf. States she has a hx of 'sciatica' on the L.  Has seen chiropractic in the past, but none in the last year. Had a massage recently, but found herself to be very nervous during. Has been working on some light extension based exercises, which seem to be helping.   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 Had been walking daily, but none since the injury. No strength exercise.   Sleep disturbance:  Not disrupted  Pain:     Current pain ratin    At best pain ratin    At worst pain rating:  3    Exacerbated by: bending, reaching, twisting, lifting.  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  Patient Goals:     Patient goals for therapy:  Decreased pain, increased strength, independence with ADLs/IADLs, return to work and return to sport/leisure activities      Past Medical History:   Diagnosis Date   • Anxiety and depression 2018   • Cervicalgia    • Depression with anxiety    • Fibroid, uterine    • Gastroesophageal reflux disease without esophagitis 2018   • Generalized anxiety disorder 2018   • GERD (gastroesophageal reflux disease)    • Hypertension      Past Surgical History:   Procedure Laterality Date   • HYSTERECTOMY, VAGINAL  2005     Social History     Tobacco Use   • Smoking status: Never Smoker   • Smokeless tobacco: Never Used   Substance Use Topics   • Alcohol use: Yes     Alcohol/week: 0.5 oz     Types: 1 drink(s) per week     Comment: rare     Family and Occupational History     Socioeconomic History   • Marital " status: Single     Spouse name: Not on file   • Number of children: Not on file   • Years of education: Not on file   • Highest education level: Not on file   Occupational History   • Not on file       Objective     Postural Observations  Seated posture: fair  Standing posture: good        Hip Screen   Hip strength within functional limits  Hip joint mobility within functional limits    Neurological Testing     Reflexes   Left   Patellar (L4): normal (2+)  Achilles (S1): normal (2+)    Right   Patellar (L4): normal (2+)  Achilles (S1): normal (2+)    Myotome testing   Lumbar (left)   All left lumbar myotomes within normal limits    Lumbar (right)   All right lumbar myotomes within normal limits    Dermatome testing   Lumbar (left)   All left lumbar dermatomes intact    Lumbar (right)   All right lumbar dermatomes intact    Palpation   Left   Hypertonic in the lumbar paraspinals.   Tenderness of the gluteus medius, iliopsoas and lumbar paraspinals.     Right   Hypertonic in the lumbar paraspinals and quadratus lumborum.   Tenderness of the gluteus medius, iliopsoas and lumbar paraspinals.     Tenderness     Left Hip   Tenderness in the iliolumbar ligament.  No tenderness in the sacroiliac joint and sacrum.     Right Hip   Tenderness in the iliolumbar ligament. No tenderness in the sacroiliac joint and sacrum.     Active Range of Motion     Lumbar   Flexion: decreased (apprehensive, self limits at midshin, walks self up legs back to upright)  Extension: decreased (25 deg, pressure across lower lumbar)  Left lateral flexion: within functional limits  Right lateral flexion: within functional limits  Left rotation: decreased  Right rotation: within functional limits    Joint Play   Spine     Central PA [place]        L2: hypomobile       L3: hypomobile and painful       L4: hypomobile and painful       L5: hypomobile    Unilateral PA [place] (right)        L3: hypomobile       L4: hypomobile (cavitation with R GII  "UPA)        Strength:      Abdominals   Lower abdominals: Able to initiate but not maintain neutral    Lower extremities   Normal left lower extremity strength  Normal right lower extremity strength    Tests       Lumbar spine (left)      Negative slump.   Lumbar spine (right)     Negative slump.     Left Pelvic Girdle/Sacrum   Negative: sacral thrust.     Right Pelvic Girdle/Sacrum   Negative: sacral thrust.     Left Hip   SLR: Negative.     Right Hip   SLR: Negative.     General Comments     Spine Comments   PPU- \"Feels good\"        Therapeutic Exercises (CPT 27466):     1. PPU, x 10, HEP    2. Prone alt LE ext, x 10 ea, HEP    3. LTR, x 10 ea, HEP    4. KOTS/fig 4 , x 1 min ea      Time-based treatments/modalities:    Physical Therapy Timed Treatment Charges  Therapeutic exercise minutes (CPT 20687): 15 minutes      Assessment, Response and Plan:   Impairments: abnormal or restricted ROM, activity intolerance, difficulty performing job, impaired functional mobility, impaired physical strength, lacks appropriate home exercise program and limited ADL's    Assessment details:    Ms. Steward is a 61 y.o female who presents to PT with complaint of chronic, episodic LBP with some recent aching/cramping in the R posterior thigh and calf.  PT evaluation is most consistent with lumbar dysfunction.  Pt demonstrates limited lumbar AROM in flex, ext and L rotation, which result in guarded functional movement patterns and UE bracing to supplement hip hinge.  She has increased tone through the lumbar paraspinals and poor core/posterior chain strength. Neural signs are NEG during today's session. Skilled PT services are indicated to address the mentioned functional limitations and enhance QOL.     Prognosis: good    Goals:   Short Term Goals:   - Resolve pt report of cramping in the R LE  - Pt returns to daily movement/walking practice  - Pt able to hold ball bridge position x 2 min   - Demonstrates indep hip hinge  Short term " goal time span:  1-2 weeks      Long Term Goals:    - Able to DL 15# box with indep mechanics and no pain  - Improved LEFS to 70/80  - Indep with HEP  Long term goal time span:  6-8 weeks    Plan:   Therapy options:  Physical therapy treatment to continue  Planned therapy interventions:  E Stim Unattended (CPT 22740), Functional Training, Self Care (CPT 34002), Therapeutic Activities (CPT 03574), Therapeutic Exercise (CPT 34682), Hot or Cold Pack Therapy (CPT 35827), Mechanical Traction (CPT 76360), Neuromuscular Re-education (CPT 38266) and Manual Therapy (CPT 53002)  Frequency:  2x week  Duration in weeks:  8  Discussed with:  Patient      Functional Assessment Used    LEFS= 58/80    Referring provider co-signature:  I have reviewed this plan of care and my co-signature certifies the need for services.    Certification Period: 08/11/2021 to  10/06/21    Physician Signature: ________________________________ Date: ______________

## 2021-08-20 ENCOUNTER — PHYSICAL THERAPY (OUTPATIENT)
Dept: PHYSICAL THERAPY | Facility: REHABILITATION | Age: 61
End: 2021-08-20
Attending: PHYSICAL MEDICINE & REHABILITATION
Payer: COMMERCIAL

## 2021-08-20 DIAGNOSIS — M53.3 SACROILIAC JOINT DYSFUNCTION: ICD-10-CM

## 2021-08-20 DIAGNOSIS — M54.50 LUMBOSACRAL PAIN: ICD-10-CM

## 2021-08-20 PROCEDURE — 97110 THERAPEUTIC EXERCISES: CPT

## 2021-08-20 PROCEDURE — 97140 MANUAL THERAPY 1/> REGIONS: CPT

## 2021-08-20 NOTE — OP THERAPY DAILY TREATMENT
"  Outpatient Physical Therapy  DAILY TREATMENT     [facility] Physical Therapy [address].  [address]  [place] [address]  Phone:  [phone]  Fax:  [phone]    Date: 08/20/2021    Patient: Mariza Steward  YOB: 1960  MRN: [id number]     Time Calculation    Start time: 0800  Stop time: 0844 Time Calculation (min): 44 minutes         Chief Complaint: Back Problem    Visit #: 2    SUBJECTIVE:  I felt good leaving, but sore later that day.  HEP is helping reduce sx, but still having radic with driving and sensitivity to flexion/rotation based movements.     OBJECTIVE:      Therapeutic Exercises (CPT 95572):     1. 3D pelvic tilts, x 20 ea    2. LTR, x 10 ea    3. KTOS, x 1 min ea    4. Supine active HS str, x 10 ea    5. Bridges, x 20, lower with focus on glute recruitment due to HS cramping    6. GERMAN with alt knee flex, x 20 ea    7. Prone TKE, 5\" x 15 ea    8. Prone alt LE ext, 5\" x 15 ea    9. Thomas pose, 3 way x 1 min ea    10. Gait, tall posture, improved arm swing. x 3 min     11. Reviewed alt options to perform HEP in seated or standing to enhance frequency of performance.     Therapeutic Treatments and Modalities:     1. Manual Therapy (CPT 74634), Trial of hooklying manual txn x 5 min (60/20\"), prone  GIII, scaral txn.     Time-based treatments/modalities:    Physical Therapy Timed Treatment Charges  Manual therapy minutes (CPT 59857): 14 minutes  Therapeutic exercise minutes (CPT 76504): 30 minutes    ASSESSMENT:   Response to treatment: Good tolerance to manual txn and may benefit from mechanical next visit.  Improved segmental mobility with PAs through lumbar spine. Decreased sensitivity R L2-4. Poor functional control of lumbar lordosis.     PLAN/RECOMMENDATIONS:   Plan for treatment: therapy treatment to continue next visit.  Planned interventions for next visit: continue with current treatment.       "

## 2021-08-24 ENCOUNTER — PHYSICAL THERAPY (OUTPATIENT)
Dept: PHYSICAL THERAPY | Facility: REHABILITATION | Age: 61
End: 2021-08-24
Attending: PHYSICAL MEDICINE & REHABILITATION
Payer: COMMERCIAL

## 2021-08-24 DIAGNOSIS — M54.50 LUMBOSACRAL PAIN: ICD-10-CM

## 2021-08-24 DIAGNOSIS — M53.3 SACROILIAC JOINT DYSFUNCTION: ICD-10-CM

## 2021-08-24 PROCEDURE — 97110 THERAPEUTIC EXERCISES: CPT

## 2021-08-24 PROCEDURE — 97012 MECHANICAL TRACTION THERAPY: CPT

## 2021-08-24 NOTE — OP THERAPY DAILY TREATMENT
"  Outpatient Physical Therapy  DAILY TREATMENT     [facility] Physical Therapy [address].  [address]  [place] [address]  Phone:  [phone]  Fax:  [phone]    Date: 08/24/2021    Patient: Mariza Steward  YOB: 1960  MRN: [id number]     Time Calculation    Start time: 0945  Stop time: 1050 Time Calculation (min): 65 minutes         Chief Complaint: Back Problem    Visit #: 3    SUBJECTIVE:  Discouraged.  Had a couple really good days, \"I was feeling more confident with bending over.\" but had a flare up on Sunday with sciatica again.     OBJECTIVE:      Therapeutic Exercises (CPT 86712):     1. 3D pelvic tilts, x 20 ea    2. LTR, x 10 ea    3. KTOS, x 1 min ea    4. Supine active HS str, x 10 ea    5. Bridges, x 20    6. Side plank, x 15\" ea    7. Prone alt LE ext, x 15 ea    8. GERMAN with alt knee flexion    9. Thomas pose, 3 way x 1 min ea    10. Super man , max effort (3x10\" holds)    11. Inclined plank, x 45\"    Therapeutic Treatments and Modalities:     1. Mechanical Traction (CPT 07516), 80/60#, 60/20\" with MH x 15 min     Time-based treatments/modalities:    Physical Therapy Timed Treatment Charges  Therapeutic exercise minutes (CPT 20621): 45 minutes    ASSESSMENT:   Response to treatment: Extremely limited endurance with core stability tasks, likely contributing to ongoing pain flare ups.  Pt is receptive to education. Some fear avoidance, but participatory. Trial of [facility] txn today, monitor response.     PLAN/RECOMMENDATIONS:   Plan for treatment: therapy treatment to continue next visit.  Planned interventions for next visit: continue with current treatment.       "

## 2021-09-02 ENCOUNTER — PATIENT MESSAGE (OUTPATIENT)
Dept: INTERNAL MEDICINE | Facility: IMAGING CENTER | Age: 61
End: 2021-09-02

## 2021-09-02 DIAGNOSIS — Z79.899 LONG TERM USE OF DRUG: ICD-10-CM

## 2021-09-02 DIAGNOSIS — I10 ESSENTIAL HYPERTENSION: ICD-10-CM

## 2021-09-02 DIAGNOSIS — G47.62 LEG CRAMPS, SLEEP RELATED: ICD-10-CM

## 2021-09-02 DIAGNOSIS — Z00.00 WELLNESS EXAMINATION: ICD-10-CM

## 2021-09-02 DIAGNOSIS — F41.1 GENERALIZED ANXIETY DISORDER: ICD-10-CM

## 2021-09-02 DIAGNOSIS — F41.9 ANXIETY: ICD-10-CM

## 2021-09-02 DIAGNOSIS — K21.9 GASTROESOPHAGEAL REFLUX DISEASE WITHOUT ESOPHAGITIS: ICD-10-CM

## 2021-09-02 DIAGNOSIS — Z86.39 HISTORY OF VITAMIN D DEFICIENCY: ICD-10-CM

## 2021-09-02 RX ORDER — LORAZEPAM 0.5 MG/1
0.5 TABLET ORAL
Qty: 30 TABLET | Refills: 0 | Status: SHIPPED | OUTPATIENT
Start: 2021-09-02 | End: 2022-01-31 | Stop reason: SDUPTHER

## 2021-09-03 ENCOUNTER — PHYSICAL THERAPY (OUTPATIENT)
Dept: PHYSICAL THERAPY | Facility: REHABILITATION | Age: 61
End: 2021-09-03
Attending: PHYSICAL MEDICINE & REHABILITATION
Payer: COMMERCIAL

## 2021-09-03 DIAGNOSIS — M53.3 SACROILIAC JOINT DYSFUNCTION: ICD-10-CM

## 2021-09-03 DIAGNOSIS — M54.50 LUMBOSACRAL PAIN: ICD-10-CM

## 2021-09-03 PROCEDURE — 97110 THERAPEUTIC EXERCISES: CPT

## 2021-09-03 PROCEDURE — 97012 MECHANICAL TRACTION THERAPY: CPT

## 2021-09-03 NOTE — OP THERAPY DAILY TREATMENT
"  Outpatient Physical Therapy  DAILY TREATMENT     [facility] Physical Therapy [address].  [address]  [place] [address]  Phone:  [phone]  Fax:  [phone]    Date: 09/03/2021    Patient: Mariza Steward  YOB: 1960  MRN: [id number]     Time Calculation    Start time: 0848  Stop time: 0950 Time Calculation (min): 62 minutes         Chief Complaint: Back Problem    Visit #: 4    SUBJECTIVE:  I think I am starting to get better.  Not sure if it was the PT or the massage she got later, but hasn't had any sciatica in the last couple days. Still with some pain that is localized to the L side.     OBJECTIVE:      Therapeutic Exercises (CPT 07253):     1. Supine TA march, x 20 ea    2. Supine BKFO, x 20 ea    3. Supine 90/90 hold, 4x10\"    4. Ball bridge, 10x10\" holds    5. Side plank, 3x10\" ea    6. Prone alt LE ext, x 15 ea    7. STS, full x 10, taps x 10, with 10# weight x 5    Therapeutic Treatments and Modalities:     1. Mechanical Traction (CPT 61216), 80/60#, 60/20\" with MH x 15 min     Time-based treatments/modalities:    Physical Therapy Timed Treatment Charges  Therapeutic exercise minutes (CPT 86809): 40 minutes    ASSESSMENT:   Response to treatment: Decreased fear avoidance of movement today.  Able to advance to hip hinge re-ed, well tolerated though STS.  Would benefit from further progressions for postural strength and endurance. Progress toward air squat and DL re-ed    PLAN/RECOMMENDATIONS:   Plan for treatment: therapy treatment to continue next visit.  Planned interventions for next visit: continue with current treatment.       "

## 2021-09-08 ENCOUNTER — PHYSICAL THERAPY (OUTPATIENT)
Dept: PHYSICAL THERAPY | Facility: REHABILITATION | Age: 61
End: 2021-09-08
Attending: PHYSICAL MEDICINE & REHABILITATION
Payer: COMMERCIAL

## 2021-09-08 DIAGNOSIS — M53.3 SACROILIAC JOINT DYSFUNCTION: ICD-10-CM

## 2021-09-08 DIAGNOSIS — M54.50 LUMBOSACRAL PAIN: ICD-10-CM

## 2021-09-08 PROCEDURE — 97110 THERAPEUTIC EXERCISES: CPT

## 2021-09-08 NOTE — OP THERAPY DAILY TREATMENT
Outpatient Physical Therapy  DAILY TREATMENT     [facility] Physical Therapy [address].  [address]  [place] [address]  Phone:  [phone]  Fax:  [phone]    Date: 09/08/2021    Patient: Mariza Steward  YOB: 1960  MRN: [id number]     Time Calculation    Start time: 0803  Stop time: 0845 Time Calculation (min): 42 minutes         Chief Complaint: Back Problem    Visit #: 5    SUBJECTIVE:  Happy to report that her back continues to improve    OBJECTIVE:      Therapeutic Exercises (CPT 92637):     1. LTR, x 10 ea     2. Active HS str, x 15 ea    3. KTOS, x 1 min ea    4. Marching bridge, x 20 ea    5. Supine 90/90 taps, x 15 ea    6. Pullbacks, L3 x 20    7. Anti rotation press, L3 x 15 ea    8. STS, x 10    9. Air squat, x 10, mirror feedback to improve lateral knee control    10. Deadlift re-ed, x 5 min-> box (empty) x 10 reps    11. Lunge, x 10 ea      Time-based treatments/modalities:    Physical Therapy Timed Treatment Charges  Therapeutic exercise minutes (CPT 71103): 42 minutes    ASSESSMENT:   Response to treatment: Progressing well with re-ed of functional movement patterns. Able to progress hip hinge training to DL patterning with good tolerance.     PLAN/RECOMMENDATIONS:   Plan for treatment: therapy treatment to continue next visit.  Planned interventions for next visit: continue with current treatment.  Review and load DL, chops.

## 2021-09-10 ENCOUNTER — PHYSICAL THERAPY (OUTPATIENT)
Dept: PHYSICAL THERAPY | Facility: REHABILITATION | Age: 61
End: 2021-09-10
Attending: PHYSICAL MEDICINE & REHABILITATION
Payer: COMMERCIAL

## 2021-09-10 DIAGNOSIS — M54.50 LUMBOSACRAL PAIN: ICD-10-CM

## 2021-09-10 DIAGNOSIS — M53.3 SACROILIAC JOINT DYSFUNCTION: ICD-10-CM

## 2021-09-10 PROCEDURE — 97110 THERAPEUTIC EXERCISES: CPT

## 2021-09-10 NOTE — OP THERAPY DAILY TREATMENT
"  Outpatient Physical Therapy  DAILY TREATMENT     [facility] Physical Therapy [address].  [address]  [place] [address]  Phone:  [phone]  Fax:  [phone]    Date: 09/10/2021    Patient: Mariza Steward  YOB: 1960  MRN: [id number]     Time Calculation    Start time: 1304  Stop time: 1345 Time Calculation (min): 41 minutes         Chief Complaint: Back Problem    Visit #: 6    SUBJECTIVE:  I am doing well overall. Tweaked my back a little yesterday- in an awkward position trying to put drops in the dogs ear.     OBJECTIVE:      Therapeutic Exercises (CPT 35639):     1. LTR, x 10 ea     2. Active HS str, x 15 ea    3. KTOS, x 1 min ea    4. Ball bridge, 10x10\"    5. Side plank, 2x15\" ea    6. Prone alt LE ext , x 10 ea    7. Prone LE ext in 90 knee flexion, x 10 ea    8. Anti rotation press, L3 x 15 ea    9. Chops, L2 x 20 ea    10. Rotational ball toss, 6# vs rebounder    11. Walking lunges, x 1 lap    12. Hip hikes , x 20      Time-based treatments/modalities:    Physical Therapy Timed Treatment Charges  Therapeutic exercise minutes (CPT 80711): 41 minutes    ASSESSMENT:   Response to treatment: Able to advance to dynamic rotation and flexion functional training without pain.  Lateral hip stability is limited for control in SL tasks.     PLAN/RECOMMENDATIONS:   Plan for treatment: therapy treatment to continue next visit.  Planned interventions for next visit: continue with current treatment.       "

## 2021-09-14 ENCOUNTER — PHYSICAL THERAPY (OUTPATIENT)
Dept: PHYSICAL THERAPY | Facility: REHABILITATION | Age: 61
End: 2021-09-14
Attending: PHYSICAL MEDICINE & REHABILITATION
Payer: COMMERCIAL

## 2021-09-14 DIAGNOSIS — M53.3 SACROILIAC JOINT DYSFUNCTION: ICD-10-CM

## 2021-09-14 DIAGNOSIS — M54.50 LUMBOSACRAL PAIN: ICD-10-CM

## 2021-09-14 PROCEDURE — 97110 THERAPEUTIC EXERCISES: CPT

## 2021-09-14 NOTE — OP THERAPY DAILY TREATMENT
"  Outpatient Physical Therapy  DAILY TREATMENT     [facility] Physical Therapy [address].  [address]  [place] [address]  Phone:  [phone]  Fax:  [phone]    Date: 09/14/2021    Patient: Mariza Steward  YOB: 1960  MRN: [id number]     Time Calculation    Start time: 1119  Stop time: 1200 Time Calculation (min): 41 minutes         Chief Complaint: Back Problem    Visit #: 7    SUBJECTIVE:  I continue to improve.  I have a tweak now and then, but seems to resolve quickly.     OBJECTIVE:      Therapeutic Exercises (CPT 48235):     1. Shuttle, Squat: B 7C x 20, SL 3C x 20 ea    2. Trunk flexion (pole stretch), x 2 min total, 3 way    3. TRX rows, 2x10    4. Air squat, 2x10, to an 18\" target    5. Deadlift, re-ed x 10 min, 10# box for approx 20 reps, mirror feedback    6. Review of alternate lifting options for odd objects, 1/2 lunge, step up to another surface, squatting       Time-based treatments/modalities:    Physical Therapy Timed Treatment Charges  Therapeutic exercise minutes (CPT 13670): 41 minutes    ASSESSMENT:   Response to treatment: Continues to gain confidence loading in through hip hinge position.  Hoping will feel indep with HEP next visit and ready for d/c    PLAN/RECOMMENDATIONS:   Plan for treatment: therapy treatment to continue next visit.  Planned interventions for next visit: continue with current treatment.       "

## 2021-09-19 DIAGNOSIS — I10 ESSENTIAL HYPERTENSION: ICD-10-CM

## 2021-09-20 RX ORDER — HYDROCHLOROTHIAZIDE 12.5 MG/1
TABLET ORAL
Qty: 90 TABLET | Refills: 0 | Status: SHIPPED | OUTPATIENT
Start: 2021-09-20 | End: 2021-12-20

## 2021-09-21 ENCOUNTER — PHYSICAL THERAPY (OUTPATIENT)
Dept: PHYSICAL THERAPY | Facility: REHABILITATION | Age: 61
End: 2021-09-21
Attending: PHYSICAL MEDICINE & REHABILITATION
Payer: COMMERCIAL

## 2021-09-21 DIAGNOSIS — M54.50 LUMBOSACRAL PAIN: ICD-10-CM

## 2021-09-21 DIAGNOSIS — M53.3 SACROILIAC JOINT DYSFUNCTION: ICD-10-CM

## 2021-09-21 PROCEDURE — 97110 THERAPEUTIC EXERCISES: CPT

## 2021-09-21 NOTE — OP THERAPY DISCHARGE SUMMARY
Outpatient Physical Therapy  DISCHARGE SUMMARY NOTE      [facility] Physical Therapy [address]  [address]  [place] [address]  Phone:  [phone]  Fax:  [phone]    Date of Visit: 09/21/2021    Patient: Mariza Steward  YOB: 1960  MRN: [id number]     Referring Provider: Kofi Anderson M.D.  [address]  [address]  [place] [address]   Referring Diagnosis Sacrococcygeal disorders, not elsewhere classified [M53.3];Low back pain [M54.5]         Functional Assessment Used  Oziel Tom Low Back Pain and Disability Score: 8.33     Your patient is being discharged from Physical Therapy with the following comments:   · Goals met    Comments:  Ms. Steward was seen for 8 PT sessions treating her LBP with R radiculopathy.  PT tx included progressive therex for lumbopelvic mobility/stability, mechanical traction and functional movement re-education.  Pt demonstrates positive response, reporting 90-95% improvement overall and has met all goals at this time.  Pt now demonstrates full AROM of the lumbar spine, resolved neural signs, improved recruitment of core stabilizers, improved postural endurance and indep with functional hip hinge. Pt is reporting return to her baseline, but does continue to demonstrate some fear avoidance flexion based movements and lifting acquired over years of flare ups related to this movement pattern. She is indep with HEP to continue to develop strength to support multidirectional loading.  Needs time to work through the progressions, but is no longer in need of skilled guidance.     Recommendations:  D/C to HEP.    Courtney Harris, PT, DPT    Date: 9/21/2021

## 2021-09-21 NOTE — OP THERAPY DAILY TREATMENT
Outpatient Physical Therapy  DAILY TREATMENT     [facility] Physical [address].  [address]  [place] [address]  Phone:  [phone]  Fax:  [phone]    Date: 09/21/2021    Patient: Mariza Steward  YOB: 1960  MRN: [id number]     Time Calculation    Start time: 0817  Stop time: 0859 Time Calculation (min): 42 minutes         Chief Complaint: Back Problem    Visit #: 8    SUBJECTIVE:  Last week we pushed it a little too much. Felt good during, but very sore after and some sciatica. I feel about 90-95% better.     OBJECTIVE:  Current objective measures:   Oziel Tom Low Back Pain and Disability Score: 8.33    Short Term Goals:   - Resolve pt report of cramping in the R LE (MET)  - Pt returns to daily movement/walking practice (MET)  - Pt able to hold ball bridge position x 2 min  (MET)  - Demonstrates indep hip hinge (MET)      Long Term Goals:    - Able to DL 15# box with indep mechanics and no pain (MET)  - Improved LEFS to 70/80 (MET)  - Indep with HEP (MET)    Therapeutic Exercises (CPT 59860):     1. 3D pelvic tilts, x 20 ea    2. LTR, x 15    3. KTOS, x 1 min ea    4. Ball bridge, x 2 min hold    5. Review plank posiition-> counter push ups, x 2 min, x 15    6. Review hip hinge for air squat, indep    7. DL, box 15# x 10      Therapeutic Exercise Summary: Review of objective measures:  Lumbar AROM- flex= FT to toes, ext= 30 deg painless, SB= FT to knees painless.         Time-based treatments/modalities:    Physical Therapy Timed Treatment Charges  Therapeutic exercise minutes (CPT 27760): 42 minutes    ASSESSMENT:   Response to treatment: See d/c note      PLAN/RECOMMENDATIONS:   Plan for treatment: no further treatment needed.

## 2021-09-28 ENCOUNTER — IMMUNIZATION (OUTPATIENT)
Dept: OCCUPATIONAL MEDICINE | Facility: CLINIC | Age: 61
End: 2021-09-28
Payer: COMMERCIAL

## 2021-09-28 ENCOUNTER — OFFICE VISIT (OUTPATIENT)
Dept: PHYSICAL MEDICINE AND REHAB | Facility: MEDICAL CENTER | Age: 61
End: 2021-09-28
Payer: COMMERCIAL

## 2021-09-28 VITALS
HEIGHT: 71 IN | HEART RATE: 69 BPM | TEMPERATURE: 97.9 F | WEIGHT: 171.96 LBS | DIASTOLIC BLOOD PRESSURE: 60 MMHG | BODY MASS INDEX: 24.07 KG/M2 | SYSTOLIC BLOOD PRESSURE: 110 MMHG | OXYGEN SATURATION: 96 %

## 2021-09-28 DIAGNOSIS — M54.50 LUMBOSACRAL PAIN: ICD-10-CM

## 2021-09-28 DIAGNOSIS — M41.80 DEXTROSCOLIOSIS: ICD-10-CM

## 2021-09-28 DIAGNOSIS — M53.3 SACROILIAC JOINT DYSFUNCTION: ICD-10-CM

## 2021-09-28 DIAGNOSIS — M16.0 PRIMARY OSTEOARTHRITIS OF BOTH HIPS: ICD-10-CM

## 2021-09-28 DIAGNOSIS — Z23 NEED FOR VACCINATION: Primary | ICD-10-CM

## 2021-09-28 DIAGNOSIS — M47.818 ARTHRITIS OF SACROILIAC JOINT: ICD-10-CM

## 2021-09-28 PROCEDURE — 90686 IIV4 VACC NO PRSV 0.5 ML IM: CPT | Performed by: PREVENTIVE MEDICINE

## 2021-09-28 PROCEDURE — 99214 OFFICE O/P EST MOD 30 MIN: CPT | Performed by: PHYSICAL MEDICINE & REHABILITATION

## 2021-09-28 ASSESSMENT — FIBROSIS 4 INDEX: FIB4 SCORE: 0.5

## 2021-09-28 ASSESSMENT — PATIENT HEALTH QUESTIONNAIRE - PHQ9: CLINICAL INTERPRETATION OF PHQ2 SCORE: 0

## 2021-09-28 ASSESSMENT — PAIN SCALES - GENERAL: PAINLEVEL: NO PAIN

## 2021-09-28 NOTE — PROGRESS NOTES
Follow-up patient note    Physiatry (physical medicine and  Rehabilitation), interventional spine and sports medicine    Date of Service: 09/28/2021  Chief complaint:   Chief Complaint   Patient presents with   • Follow-Up     Back pain       HISTORY    HPI: Mariza Steward 61 y.o. female who presents today evaluation of low back pain.    Symptoms are significantly better.  PT helped.  Sometimes, she has short episodes of back pain, but it is better and happening less.    Pain is 80-90% better than it was at last visit.    She reports that after she left, she had pain that radiated into the right leg near the ankle.  This did get aggravated initially with PT.  This did improve and and she does not report radicular symptoms now.    She has started her walking program again, walked 3 miles on the weekend.    At this time, she is back to being able to do her home chores and working on incorporating therapy in her home activities.    No regular medications, taking advil and tylenol prn.    Left hip is clunking less, but still a little bit.  This is not painful.      Medical records review:  I reviewed the note from the referring provider Chaitanya Presley M.D. dated 08/10/2020.  She was seen for left wrist and thumb pain, referred for physiatry referral, hydrodiuril 12.5mg for essential hypertension, HÉCTOR, routine lab screens.     Previous treatments:    Physical Therapy: Yes    Medications the patient is tried: flexeril, soma, advil.    Previous interventions: none    Previous surgeries to relieve the above pain:  none      ROS:   Red Flags ROS:   Fever, Chills, Sweats: Denies  Involuntary Weight Loss: Denies  Bladder Incontinence: Denies  Bowel Incontinence: Denies  Saddle Anesthesia: Denies    All other systems reviewed and negative.       PMHx:   Past Medical History:   Diagnosis Date   • Anxiety and depression 12/12/2018   • Cervicalgia    • Depression with anxiety    • Fibroid, uterine    • Gastroesophageal  reflux disease without esophagitis 12/12/2018   • Generalized anxiety disorder 12/12/2018   • GERD (gastroesophageal reflux disease)    • Hypertension        PSHx:   Past Surgical History:   Procedure Laterality Date   • HYSTERECTOMY, VAGINAL  2005       Family history   Family History   Problem Relation Age of Onset   • Hypertension Mother    • Stroke Mother    • Hyperlipidemia Mother    • Heart Disease Father         colon   • Colon Cancer Paternal Grandmother    • Cancer Paternal Aunt         breast   • Genetic Disorder Neg Hx    • Psychiatric Illness Neg Hx    • Thyroid Neg Hx    • Diabetes Neg Hx    • Dementia Neg Hx          Medications:   Current Outpatient Medications   Medication   • hydroCHLOROthiazide (HYDRODIURIL) 12.5 MG tablet   • LORazepam (ATIVAN) 0.5 MG Tab   • propranolol (INDERAL) 20 MG Tab   • methylPREDNISolone (MEDROL DOSEPAK) 4 MG Tablet Therapy Pack   • lisinopril (PRINIVIL) 20 MG Tab   • DEXILANT 60 MG CAPSULE DELAYED RELEASE delayed-release capsule   • triamcinolone acetonide (KENALOG) 0.1 % Cream   • potassium chloride SA (K-DUR) 10 MEQ Tab CR   • Cholecalciferol (VITAMIN D3) 1000 units Cap   • Magnesium 200 MG Tab   • Diclofenac Sodium 1 % Gel     No current facility-administered medications for this visit.       Allergies:   Allergies   Allergen Reactions   • Flexeril [Cyclobenzaprine]      Insomnia       Social Hx:   Social History     Socioeconomic History   • Marital status: Single     Spouse name: Not on file   • Number of children: Not on file   • Years of education: Not on file   • Highest education level: Not on file   Occupational History   • Not on file   Tobacco Use   • Smoking status: Never Smoker   • Smokeless tobacco: Never Used   Substance and Sexual Activity   • Alcohol use: Yes     Alcohol/week: 0.5 oz     Types: 1 drink(s) per week     Comment: rare   • Drug use: No   • Sexual activity: Not on file   Other Topics Concern   •  Service No   • Blood Transfusions No  "  • Caffeine Concern No   • Occupational Exposure No   • Hobby Hazards No   • Sleep Concern Yes   • Stress Concern No   • Weight Concern Yes   • Special Diet No   • Back Care No   • Exercise No   • Bike Helmet No   • Seat Belt Yes   • Self-Exams No   Social History Narrative    .     Children: no.     Work: [employer],      Social Determinants of Health     Financial Resource Strain:    • Difficulty of Paying Living Expenses:    Food Insecurity:    • Worried About Running Out of Food in the Last Year:    • Ran Out of Food in the Last Year:    Transportation Needs:    • Lack of Transportation (Medical):    • Lack of Transportation (Non-Medical):    Physical Activity:    • Days of Exercise per Week:    • Minutes of Exercise per Session:    Stress:    • Feeling of Stress :    Social Connections:    • Frequency of Communication with Friends and Family:    • Frequency of Social Gatherings with Friends and Family:    • Attends [employer] Services:    • Active Member of Clubs or Organizations:    • Attends Club or Organization Meetings:    • Marital Status:    Intimate Partner Violence:    • Fear of Current or Ex-Partner:    • Emotionally Abused:    • Physically Abused:    • Sexually Abused:          EXAMINATION     Physical Exam:   Vitals: /60 (BP Location: Right arm, Patient Position: Sitting, BP Cuff Size: Small adult)   Pulse 69   Temp 36.6 °C (97.9 °F) (Temporal)   Ht 1.803 m (5' 11\")   Wt 78 kg (171 lb 15.3 oz)   SpO2 96%     Constitutional:   Body Habitus: Body mass index is 23.98 kg/m².  Cooperation: Fully cooperates with exam  Appearance: Well-groomed, well-nourished, not disheveled, in no acute distress    Eyes: No scleral icterus, no proptosis     ENT -no obvious auditory deficits, wearing a face mask    Skin -no rashes or lesions noted     Respiratory-  breathing comfortable on room air, no audible wheezing    Cardiovascular- No lower extremity edema is noted.     Psychiatric- alert " and oriented ×3. Normal affect.     Gait - normal gait, no use of ambulatory device, nonantalgic.     Musculoskeletal -     Thoracic/Lumbar Spine/Sacral Spine/Hips   Inspection: No evidence of atrophy in bilateral lower extremities throughout     No focal motor or sensory deficits in the lower extremities bilaterally  SLR is negative bilaterally  Katy's test is negative bilaterally  Negative forward flexion test.  Negative stork test bilaterally  No limitation to hip ROM in internal or external rotation bilaterally    Reflexes  ?  R L   Patella  2+ 2+   Achilles   2+ 2+       MEDICAL DECISION MAKING    Medical records review: see under HPI section.     DATA    Labs:   Lab Results   Component Value Date/Time    SODIUM 134 (L) 09/01/2020 08:00 AM    POTASSIUM 4.2 09/01/2020 08:00 AM    CHLORIDE 96 09/01/2020 08:00 AM    CO2 26 09/01/2020 08:00 AM    ANION 12.0 09/01/2020 08:00 AM    GLUCOSE 88 09/01/2020 08:00 AM    GLUCOSE 87 09/01/2020 08:00 AM    BUN 18 09/01/2020 08:00 AM    CREATININE 0.72 09/01/2020 08:00 AM    CALCIUM 9.8 09/01/2020 08:00 AM    ASTSGOT 12 09/01/2020 08:00 AM    ALTSGPT 17 09/01/2020 08:00 AM    TBILIRUBIN 0.9 09/01/2020 08:00 AM    ALBUMIN 4.7 09/01/2020 08:00 AM    TOTPROTEIN 7.1 09/01/2020 08:00 AM    GLOBULIN 2.4 09/01/2020 08:00 AM    AGRATIO 2.0 09/01/2020 08:00 AM       No results found for: PROTHROMBTM, INR     Lab Results   Component Value Date/Time    WBC 5.6 09/01/2020 08:00 AM    RBC 4.63 09/01/2020 08:00 AM    HEMOGLOBIN 15.4 09/01/2020 08:00 AM    HEMATOCRIT 45.5 09/01/2020 08:00 AM    MCV 98.3 (H) 09/01/2020 08:00 AM    MCH 33.3 (H) 09/01/2020 08:00 AM    MCHC 33.8 09/01/2020 08:00 AM    MPV 10.0 09/01/2020 08:00 AM    NEUTSPOLYS 51.70 09/01/2020 08:00 AM    LYMPHOCYTES 37.30 09/01/2020 08:00 AM    MONOCYTES 8.90 09/01/2020 08:00 AM    EOSINOPHILS 1.20 09/01/2020 08:00 AM    BASOPHILS 0.70 09/01/2020 08:00 AM        Lab Results   Component Value Date/Time    HBA1C 5.3 02/09/2018  07:34 AM        Imaging: I personally reviewed following images, these are my reads  Xray lumbar spine 08/05/2021  Mild facet arthropathy.  Note of mild dextroconvex scoliosis    Xray sacrum and coccyx 08/05/2021  Mild hip osteoarthritis, mild sacroiliac joint osteoarthritis bilaterally    IMAGING radiology reads. I reviewed the following radiology reads   Xray lumbar spine 08/05/2021  IMPRESSION:     Mild dextroconvex scoliosis and facet arthropathy                                                                                   Xray sacrum and coccyx 08/05/2021  IMPRESSION:     Osteoarthritis of both sacroiliac joints and both hip joint                                                                                             Diagnosis   Visit Diagnoses     ICD-10-CM   1. Lumbosacral pain  M54.5   2. Sacroiliac joint dysfunction  M53.3   3. Primary osteoarthritis of both hips  M16.0   4. Arthritis of sacroiliac joint  M47.818   5. Dextroscoliosis  M41.80           ASSESSMENT:  Mariza Steward 61 y.o. female seen for above     Mariza was seen today for follow-up.    Diagnoses and all orders for this visit:    Lumbosacral pain    Sacroiliac joint dysfunction    Primary osteoarthritis of both hips    Arthritis of sacroiliac joint    Dextroscoliosis       1. Discussed findings on xray of the lumbar spine and sacrum.  2. She is doing better after PT with significant improvement in symptoms.  3. Discussed that her hip pain is manageable and for now, plan to hold off on further diagnostic or therapeutic treatments.  She feels that symptoms are manageable with current exercise program and PT and declines more aggressive treatment at this time.      Follow-up: Return if symptoms worsen or fail to improve.    Thank you very much for asking me to participate in Mariza Steward's care.  Please contact me with any questions or concerns.    Please note that this dictation was created using voice recognition  software. I have made every reasonable attempt to correct obvious errors but there may be errors of grammar and content that I may have overlooked prior to finalization of this note.      Kofi Anderson MD  Physical Medicine and Rehabilitation  Interventional Spine and Sports Physiatry  [facility]         Chaitanya Muñoz M.D.

## 2021-12-07 ENCOUNTER — NON-PROVIDER VISIT (OUTPATIENT)
Dept: INTERNAL MEDICINE | Facility: IMAGING CENTER | Age: 61
End: 2021-12-07
Payer: COMMERCIAL

## 2021-12-07 ENCOUNTER — HOSPITAL ENCOUNTER (OUTPATIENT)
Facility: MEDICAL CENTER | Age: 61
End: 2021-12-07
Attending: INTERNAL MEDICINE
Payer: COMMERCIAL

## 2021-12-07 DIAGNOSIS — G47.62 LEG CRAMPS, SLEEP RELATED: ICD-10-CM

## 2021-12-07 DIAGNOSIS — Z01.89 ENCOUNTER FOR ROUTINE LABORATORY TESTING: ICD-10-CM

## 2021-12-07 DIAGNOSIS — K21.9 GASTROESOPHAGEAL REFLUX DISEASE WITHOUT ESOPHAGITIS: ICD-10-CM

## 2021-12-07 DIAGNOSIS — Z00.00 WELLNESS EXAMINATION: ICD-10-CM

## 2021-12-07 DIAGNOSIS — Z86.39 HISTORY OF VITAMIN D DEFICIENCY: ICD-10-CM

## 2021-12-07 DIAGNOSIS — I10 ESSENTIAL HYPERTENSION: ICD-10-CM

## 2021-12-07 DIAGNOSIS — Z79.899 LONG TERM USE OF DRUG: ICD-10-CM

## 2021-12-07 LAB
ALBUMIN SERPL BCP-MCNC: 4.7 G/DL (ref 3.2–4.9)
ALBUMIN/GLOB SERPL: 2.6 G/DL
ALP SERPL-CCNC: 73 U/L (ref 30–99)
ALT SERPL-CCNC: 17 U/L (ref 2–50)
ANION GAP SERPL CALC-SCNC: 11 MMOL/L (ref 7–16)
AST SERPL-CCNC: 24 U/L (ref 12–45)
BASOPHILS # BLD AUTO: 0.6 % (ref 0–1.8)
BASOPHILS # BLD: 0.03 K/UL (ref 0–0.12)
BILIRUB SERPL-MCNC: 0.5 MG/DL (ref 0.1–1.5)
BUN SERPL-MCNC: 15 MG/DL (ref 8–22)
CALCIUM SERPL-MCNC: 7.7 MG/DL (ref 8.5–10.5)
CHLORIDE SERPL-SCNC: 105 MMOL/L (ref 96–112)
CHOLEST SERPL-MCNC: 201 MG/DL (ref 100–199)
CO2 SERPL-SCNC: 27 MMOL/L (ref 20–33)
CREAT SERPL-MCNC: 0.8 MG/DL (ref 0.5–1.4)
EOSINOPHIL # BLD AUTO: 0.07 K/UL (ref 0–0.51)
EOSINOPHIL NFR BLD: 1.4 % (ref 0–6.9)
ERYTHROCYTE [DISTWIDTH] IN BLOOD BY AUTOMATED COUNT: 43.4 FL (ref 35.9–50)
GLOBULIN SER CALC-MCNC: 1.8 G/DL (ref 1.9–3.5)
GLUCOSE SERPL-MCNC: 90 MG/DL (ref 65–99)
HCT VFR BLD AUTO: 45.4 % (ref 37–47)
HDLC SERPL-MCNC: 51 MG/DL
HGB BLD-MCNC: 15.2 G/DL (ref 12–16)
IMM GRANULOCYTES # BLD AUTO: 0.01 K/UL (ref 0–0.11)
IMM GRANULOCYTES NFR BLD AUTO: 0.2 % (ref 0–0.9)
LDLC SERPL CALC-MCNC: 127 MG/DL
LYMPHOCYTES # BLD AUTO: 2.05 K/UL (ref 1–4.8)
LYMPHOCYTES NFR BLD: 41.2 % (ref 22–41)
MAGNESIUM SERPL-MCNC: 2.3 MG/DL (ref 1.5–2.5)
MCH RBC QN AUTO: 33.4 PG (ref 27–33)
MCHC RBC AUTO-ENTMCNC: 33.5 G/DL (ref 33.6–35)
MCV RBC AUTO: 99.8 FL (ref 81.4–97.8)
MONOCYTES # BLD AUTO: 0.56 K/UL (ref 0–0.85)
MONOCYTES NFR BLD AUTO: 11.2 % (ref 0–13.4)
NEUTROPHILS # BLD AUTO: 2.26 K/UL (ref 2–7.15)
NEUTROPHILS NFR BLD: 45.4 % (ref 44–72)
NRBC # BLD AUTO: 0 K/UL
NRBC BLD-RTO: 0 /100 WBC
PLATELET # BLD AUTO: 309 K/UL (ref 164–446)
PMV BLD AUTO: 10.3 FL (ref 9–12.9)
POTASSIUM SERPL-SCNC: 4.2 MMOL/L (ref 3.6–5.5)
PROT SERPL-MCNC: 6.5 G/DL (ref 6–8.2)
RBC # BLD AUTO: 4.55 M/UL (ref 4.2–5.4)
SODIUM SERPL-SCNC: 143 MMOL/L (ref 135–145)
TRIGL SERPL-MCNC: 116 MG/DL (ref 0–149)
TSH SERPL DL<=0.005 MIU/L-ACNC: 2.31 UIU/ML (ref 0.38–5.33)
VIT B12 SERPL-MCNC: 880 PG/ML (ref 211–911)
WBC # BLD AUTO: 5 K/UL (ref 4.8–10.8)

## 2021-12-07 PROCEDURE — 82306 VITAMIN D 25 HYDROXY: CPT

## 2021-12-07 PROCEDURE — 84443 ASSAY THYROID STIM HORMONE: CPT

## 2021-12-07 PROCEDURE — 82607 VITAMIN B-12: CPT

## 2021-12-07 PROCEDURE — 85025 COMPLETE CBC W/AUTO DIFF WBC: CPT

## 2021-12-07 PROCEDURE — 80053 COMPREHEN METABOLIC PANEL: CPT

## 2021-12-07 PROCEDURE — 80061 LIPID PANEL: CPT

## 2021-12-07 PROCEDURE — 83735 ASSAY OF MAGNESIUM: CPT

## 2021-12-11 LAB — 25(OH)D3 SERPL-MCNC: 19 NG/ML (ref 30–80)

## 2021-12-17 ENCOUNTER — HOSPITAL ENCOUNTER (OUTPATIENT)
Dept: RADIOLOGY | Facility: MEDICAL CENTER | Age: 61
End: 2021-12-17
Attending: INTERNAL MEDICINE
Payer: COMMERCIAL

## 2021-12-17 DIAGNOSIS — Z12.31 VISIT FOR SCREENING MAMMOGRAM: ICD-10-CM

## 2021-12-17 PROCEDURE — 77063 BREAST TOMOSYNTHESIS BI: CPT

## 2021-12-19 DIAGNOSIS — I15.9 SECONDARY HYPERTENSION, UNSPECIFIED: ICD-10-CM

## 2021-12-19 DIAGNOSIS — I10 ESSENTIAL HYPERTENSION: ICD-10-CM

## 2021-12-20 ENCOUNTER — PHARMACY VISIT (OUTPATIENT)
Dept: PHARMACY | Facility: MEDICAL CENTER | Age: 61
End: 2021-12-20

## 2021-12-20 DIAGNOSIS — R92.8 ABNORMAL MAMMOGRAM OF RIGHT BREAST: ICD-10-CM

## 2021-12-20 PROCEDURE — RXMED WILLOW AMBULATORY MEDICATION CHARGE: Performed by: INTERNAL MEDICINE

## 2021-12-20 PROCEDURE — RXOTC WILLOW AMBULATORY OTC CHARGE

## 2021-12-20 RX ORDER — CX-024414 0.2 MG/ML
INJECTION, SUSPENSION INTRAMUSCULAR
Qty: 0.25 ML | Refills: 0 | Status: SHIPPED | OUTPATIENT
Start: 2021-12-20 | End: 2022-01-31

## 2021-12-20 RX ORDER — HYDROCHLOROTHIAZIDE 12.5 MG/1
TABLET ORAL
Qty: 90 TABLET | Refills: 0 | Status: SHIPPED | OUTPATIENT
Start: 2021-12-20 | End: 2022-03-29

## 2021-12-20 RX ORDER — LISINOPRIL 20 MG/1
TABLET ORAL
Qty: 90 TABLET | Refills: 0 | Status: SHIPPED | OUTPATIENT
Start: 2021-12-20 | End: 2022-03-10 | Stop reason: SDUPTHER

## 2021-12-21 ENCOUNTER — PHARMACY VISIT (OUTPATIENT)
Dept: PHARMACY | Facility: MEDICAL CENTER | Age: 61
End: 2021-12-21
Payer: COMMERCIAL

## 2021-12-30 ENCOUNTER — HOSPITAL ENCOUNTER (OUTPATIENT)
Dept: RADIOLOGY | Facility: MEDICAL CENTER | Age: 61
End: 2021-12-30
Attending: INTERNAL MEDICINE
Payer: COMMERCIAL

## 2021-12-30 DIAGNOSIS — R92.8 ABNORMAL MAMMOGRAM: ICD-10-CM

## 2021-12-30 PROCEDURE — 77065 DX MAMMO INCL CAD UNI: CPT

## 2022-01-26 ENCOUNTER — HOSPITAL ENCOUNTER (OUTPATIENT)
Dept: RADIOLOGY | Facility: MEDICAL CENTER | Age: 62
End: 2022-01-26
Attending: INTERNAL MEDICINE
Payer: COMMERCIAL

## 2022-01-26 DIAGNOSIS — R92.8 ABNORMAL FINDING ON BREAST IMAGING: ICD-10-CM

## 2022-01-26 LAB — PATHOLOGY CONSULT NOTE: NORMAL

## 2022-01-26 PROCEDURE — 88360 TUMOR IMMUNOHISTOCHEM/MANUAL: CPT | Mod: 91

## 2022-01-26 PROCEDURE — 77065 DX MAMMO INCL CAD UNI: CPT

## 2022-01-26 PROCEDURE — 88305 TISSUE EXAM BY PATHOLOGIST: CPT

## 2022-01-27 ENCOUNTER — TELEPHONE (OUTPATIENT)
Dept: RADIOLOGY | Facility: MEDICAL CENTER | Age: 62
End: 2022-01-27

## 2022-01-27 DIAGNOSIS — D05.11 DUCTAL CARCINOMA IN SITU (DCIS) OF RIGHT BREAST: ICD-10-CM

## 2022-01-28 PROBLEM — C50.919 BREAST CANCER (HCC): Status: ACTIVE | Noted: 2022-01-28

## 2022-01-31 ENCOUNTER — OFFICE VISIT (OUTPATIENT)
Dept: INTERNAL MEDICINE | Facility: IMAGING CENTER | Age: 62
End: 2022-01-31
Payer: COMMERCIAL

## 2022-01-31 VITALS
HEIGHT: 71 IN | TEMPERATURE: 98.4 F | HEART RATE: 86 BPM | OXYGEN SATURATION: 98 % | RESPIRATION RATE: 14 BRPM | DIASTOLIC BLOOD PRESSURE: 70 MMHG | BODY MASS INDEX: 24.64 KG/M2 | SYSTOLIC BLOOD PRESSURE: 116 MMHG | WEIGHT: 176 LBS

## 2022-01-31 DIAGNOSIS — Z17.0 MALIGNANT NEOPLASM OF UPPER-INNER QUADRANT OF RIGHT BREAST IN FEMALE, ESTROGEN RECEPTOR POSITIVE (HCC): ICD-10-CM

## 2022-01-31 DIAGNOSIS — F41.9 ANXIETY: ICD-10-CM

## 2022-01-31 DIAGNOSIS — E55.9 VITAMIN D DEFICIENCY: ICD-10-CM

## 2022-01-31 DIAGNOSIS — I10 ESSENTIAL HYPERTENSION: ICD-10-CM

## 2022-01-31 DIAGNOSIS — C50.211 MALIGNANT NEOPLASM OF UPPER-INNER QUADRANT OF RIGHT BREAST IN FEMALE, ESTROGEN RECEPTOR POSITIVE (HCC): ICD-10-CM

## 2022-01-31 DIAGNOSIS — E83.51 HYPOCALCEMIA: ICD-10-CM

## 2022-01-31 PROCEDURE — 99214 OFFICE O/P EST MOD 30 MIN: CPT | Performed by: INTERNAL MEDICINE

## 2022-01-31 PROCEDURE — RXMED WILLOW AMBULATORY MEDICATION CHARGE: Performed by: INTERNAL MEDICINE

## 2022-01-31 RX ORDER — ANTACID TABLETS 500 MG/1
2 TABLET, CHEWABLE ORAL
Qty: 100 TABLET | Refills: 11 | COMMUNITY
Start: 2022-01-31 | End: 2022-03-29

## 2022-01-31 RX ORDER — FAMOTIDINE 20 MG
3 TABLET ORAL
Qty: 100 CAPSULE | Refills: 11 | COMMUNITY
Start: 2022-01-31 | End: 2023-03-10

## 2022-01-31 RX ORDER — LORAZEPAM 0.5 MG/1
0.5 TABLET ORAL
Qty: 30 TABLET | Refills: 0 | Status: SHIPPED | OUTPATIENT
Start: 2022-01-31 | End: 2022-12-09 | Stop reason: SDUPTHER

## 2022-01-31 ASSESSMENT — FIBROSIS 4 INDEX: FIB4 SCORE: 1.17

## 2022-01-31 NOTE — PROGRESS NOTES
Established Patient Note   HPI:        Mariza returns today to follow up HTN. She stopped taking HCTZ due to recurring muscle cramps. BPs at home have been 120/80 or less. 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    Past Medical History:   Diagnosis Date   • Anxiety and depression 12/12/2018   • Breast cancer (DCIS), right upper medial  1/28/2022   • Cervicalgia    • Depression with anxiety    • Fibroid, uterine    • Gastroesophageal reflux disease without esophagitis 12/12/2018   • Generalized anxiety disorder 12/12/2018   • GERD (gastroesophageal reflux disease)    • Hypertension        Current Outpatient Medications   Medication Sig Dispense Refill   • LORazepam (ATIVAN) 0.5 MG Tab Take 1 Tablet by mouth 1 time a day as needed for Anxiety for up to 30 days. 30 Tablet 0   • Vitamin D, Cholecalciferol, 25 MCG (1000 UT) Cap Take 3 Capsules by mouth every day. 100 Capsule 11   • Calcium Carbonate 500 MG Chew Tab Chew 2 Tablets every day. 100 Tablet 11   • lisinopril (PRINIVIL) 20 MG Tab TAKE 1 TABLET BY MOUTH EVERY DAY 90 Tablet 0   • propranolol (INDERAL) 20 MG Tab TAKE 1 TABLET BY MOUTH EVERY DAY 90 tablet 3   • triamcinolone acetonide (KENALOG) 0.1 % Cream Apply 1 Application to affected area(s) 2 times a day as needed.     • potassium chloride SA (K-DUR) 10 MEQ Tab CR TAKE 1 TABLET BY MOUTH EVERY DAY 90 Tab 3   • Magnesium 200 MG Tab Take 200 mg by mouth.     • hydroCHLOROthiazide (HYDRODIURIL) 12.5 MG tablet TAKE 1 TABLET BY MOUTH EVERY DAY (Patient not taking: Reported on 1/31/2022) 90 Tablet 0     No current facility-administered medications for this visit.         Allergies   Allergen Reactions   • Flexeril [Cyclobenzaprine]      Insomnia         Social History     Tobacco Use   • Smoking status: Never Smoker   • Smokeless tobacco: Never Used   Substance Use Topics   •  "Alcohol use: Yes     Alcohol/week: 0.5 oz     Types: 1 drink(s) per week     Comment: rare   • Drug use: No       Past Surgical History:   Procedure Laterality Date   • HYSTERECTOMY, VAGINAL  2005        ROS    Ambulatory Vitals  /70 (BP Location: Left arm, Patient Position: Sitting, BP Cuff Size: Adult)   Pulse 86   Temp 36.9 °C (98.4 °F) (Temporal)   Resp 14   Ht 1.803 m (5' 11\")   Wt 79.8 kg (176 lb)   LMP 12/12/2005   SpO2 98%   BMI 24.55 kg/m²     Physical Exam  Constitutional:       Appearance: Normal appearance.   Cardiovascular:      Rate and Rhythm: Normal rate and regular rhythm.      Heart sounds: Normal heart sounds. No murmur heard.  No friction rub. No gallop.    Pulmonary:      Effort: Pulmonary effort is normal.      Breath sounds: Normal breath sounds. No wheezing or rales.         Component      Latest Ref Rng & Units 12/7/2021   WBC      4.8 - 10.8 K/uL 5.0   RBC      4.20 - 5.40 M/uL 4.55   Hemoglobin      12.0 - 16.0 g/dL 15.2   Hematocrit      37.0 - 47.0 % 45.4   MCV      81.4 - 97.8 fL 99.8 (H)   MCH      27.0 - 33.0 pg 33.4 (H)   MCHC      33.6 - 35.0 g/dL 33.5 (L)   RDW      35.9 - 50.0 fL 43.4   Platelet Count      164 - 446 K/uL 309   MPV      9.0 - 12.9 fL 10.3   Neutrophils-Polys      44.00 - 72.00 % 45.40   Lymphocytes      22.00 - 41.00 % 41.20 (H)   Monocytes      0.00 - 13.40 % 11.20   Eosinophils      0.00 - 6.90 % 1.40   Basophils      0.00 - 1.80 % 0.60   Immature Granulocytes      0.00 - 0.90 % 0.20   Nucleated RBC      /100 WBC 0.00   Neutrophils (Absolute)      2.00 - 7.15 K/uL 2.26   Lymphs (Absolute)      1.00 - 4.80 K/uL 2.05   Monos (Absolute)      0.00 - 0.85 K/uL 0.56   Eos (Absolute)      0.00 - 0.51 K/uL 0.07   Baso (Absolute)      0.00 - 0.12 K/uL 0.03   Immature Granulocytes (abs)      0.00 - 0.11 K/uL 0.01   NRBC (Absolute)      K/uL 0.00   Sodium      135 - 145 mmol/L 143   Potassium      3.6 - 5.5 mmol/L 4.2   Chloride      96 - 112 mmol/L 105 "   Co2      20 - 33 mmol/L 27   Anion Gap      7.0 - 16.0 11.0   Glucose      65 - 99 mg/dL 90   Bun      8 - 22 mg/dL 15   Creatinine      0.50 - 1.40 mg/dL 0.80   Calcium      8.5 - 10.5 mg/dL 7.7 (L)   AST(SGOT)      12 - 45 U/L 24   ALT(SGPT)      2 - 50 U/L 17   Alkaline Phosphatase      30 - 99 U/L 73   Total Bilirubin      0.1 - 1.5 mg/dL 0.5   Albumin      3.2 - 4.9 g/dL 4.7   Total Protein      6.0 - 8.2 g/dL 6.5   Globulin      1.9 - 3.5 g/dL 1.8 (L)   A-G Ratio      g/dL 2.6   Cholesterol,Tot      100 - 199 mg/dL 201 (H)   Triglycerides      0 - 149 mg/dL 116   HDL      >=40 mg/dL 51   LDL      <100 mg/dL 127 (H)   GFR If African American      >60 mL/min/1.73 m 2 >60   GFR If Non African American      >60 mL/min/1.73 m 2 >60   Vitamin B12 -True Cobalamin      211 - 911 pg/mL 880   25-Hydroxy   Vitamin D 25      30 - 80 ng/mL 19 (L)   TSH      0.380 - 5.330 uIU/mL 2.310   Magnesium      1.5 - 2.5 mg/dL 2.3     Component Ref Range & Units 1 mo ago 1 yr ago 2 yr ago 3 yr ago 6 yr ago   25-Hydroxy   Vitamin D 25 30 - 80 ng/mL 19 Low   45 R, CM  18 Low  R, CM  17 Low  R, CM  21 Low  R, CM        Assessment and Plan:     1. Essential hypertension  Basic Metabolic Panel    Normotensive taking lisinopril 20 mg qd and propranolol 20 mg qd   2. Malignant neoplasm of upper-inner quadrant of right breast in female, estrogen receptor positive (HCC)      DCIS intermediate to high-grade (no invasive component identified)   3. Hypocalcemia  Vitamin D, Cholecalciferol, 25 MCG (1000 UT) Cap    Calcium Carbonate 500 MG Chew Tab    Basic Metabolic Panel    VITAMIN D,25 HYDROXY   4. Anxiety  LORazepam (ATIVAN) 0.5 MG Tab   5. Vitamin D deficiency  Vitamin D, Cholecalciferol, 25 MCG (1000 UT) Cap    VITAMIN D,25 HYDROXY     Advised she start taking 1000 mg calcium qd; advised she increase vitamin D to 3000 IU qd. She has been referred to Dr. Aguilar to discuss breast cancer treatment. I discussed with Mariza that she may have  option of BCT (breast conserving treatment surgery) and RT and/or antihormone Rx with her diagnosis but to definitely discuss treatment options with Dr. Aguilar and consider second opinion at [facility]. I have also discussed with her that she should have regular checks post surgery every 3-6 months for first 3 years based upon current recommendations.  Check BMP and vitamin D in 3 months.     Chaitanya Presley M.D.

## 2022-02-02 ENCOUNTER — PHARMACY VISIT (OUTPATIENT)
Dept: PHARMACY | Facility: MEDICAL CENTER | Age: 62
End: 2022-02-02
Payer: COMMERCIAL

## 2022-02-03 ENCOUNTER — PATIENT OUTREACH (OUTPATIENT)
Dept: OTHER | Facility: MEDICAL CENTER | Age: 62
End: 2022-02-03

## 2022-02-03 NOTE — PROGRESS NOTES
Oncology Nurse Navigator (ONN) Meri Lee reached out to patient to follow up on referral.  ONN left a voice message requesting a call back at her convenience.

## 2022-02-23 ENCOUNTER — PATIENT MESSAGE (OUTPATIENT)
Dept: HEALTH INFORMATION MANAGEMENT | Facility: OTHER | Age: 62
End: 2022-02-23
Payer: COMMERCIAL

## 2022-02-24 ENCOUNTER — PRE-ADMISSION TESTING (OUTPATIENT)
Dept: ADMISSIONS | Facility: MEDICAL CENTER | Age: 62
End: 2022-02-24
Attending: SURGERY
Payer: COMMERCIAL

## 2022-02-24 DIAGNOSIS — Z01.812 PRE-OPERATIVE LABORATORY EXAMINATION: ICD-10-CM

## 2022-02-24 DIAGNOSIS — Z01.810 PRE-OPERATIVE CARDIOVASCULAR EXAMINATION: ICD-10-CM

## 2022-02-24 LAB
ANION GAP SERPL CALC-SCNC: 12 MMOL/L (ref 7–16)
BUN SERPL-MCNC: 21 MG/DL (ref 8–22)
CALCIUM SERPL-MCNC: 9.9 MG/DL (ref 8.5–10.5)
CHLORIDE SERPL-SCNC: 102 MMOL/L (ref 96–112)
CO2 SERPL-SCNC: 25 MMOL/L (ref 20–33)
CREAT SERPL-MCNC: 0.77 MG/DL (ref 0.5–1.4)
EKG IMPRESSION: NORMAL
GLUCOSE SERPL-MCNC: 100 MG/DL (ref 65–99)
POTASSIUM SERPL-SCNC: 4.3 MMOL/L (ref 3.6–5.5)
SODIUM SERPL-SCNC: 139 MMOL/L (ref 135–145)

## 2022-02-24 PROCEDURE — 93005 ELECTROCARDIOGRAM TRACING: CPT

## 2022-02-24 PROCEDURE — 80048 BASIC METABOLIC PNL TOTAL CA: CPT

## 2022-02-24 PROCEDURE — 36415 COLL VENOUS BLD VENIPUNCTURE: CPT

## 2022-02-24 PROCEDURE — 93010 ELECTROCARDIOGRAM REPORT: CPT | Performed by: INTERNAL MEDICINE

## 2022-02-24 RX ORDER — IBUPROFEN 200 MG
200 TABLET ORAL EVERY 6 HOURS PRN
Status: ON HOLD | COMMUNITY
End: 2022-03-08

## 2022-02-24 RX ORDER — ACETAMINOPHEN 500 MG
500-1000 TABLET ORAL EVERY 6 HOURS PRN
COMMUNITY

## 2022-02-24 RX ORDER — LORATADINE 10 MG/1
10 TABLET ORAL PRN
COMMUNITY

## 2022-02-24 ASSESSMENT — FIBROSIS 4 INDEX: FIB4 SCORE: 1.19

## 2022-03-02 ENCOUNTER — TELEPHONE (OUTPATIENT)
Dept: INTERNAL MEDICINE | Facility: IMAGING CENTER | Age: 62
End: 2022-03-02
Payer: COMMERCIAL

## 2022-03-02 NOTE — LETTER
March 3, 2022        Mariza Steward (1960)  [address]  [place] [address]      To Whom It May Concern:      For medical reasons please excuse Mariza from work Monday February 28, 2022 through Monday March 7, 2022.      Sincerely,        Chaitanya Avila M.D.

## 2022-03-03 NOTE — TELEPHONE ENCOUNTER
Patient describes high anxiety surrounding upcoming mastectomy March 8, 2022.  She would like a work excuse for this week.

## 2022-03-04 ENCOUNTER — PRE-ADMISSION TESTING (OUTPATIENT)
Dept: ADMISSIONS | Facility: MEDICAL CENTER | Age: 62
End: 2022-03-04
Attending: SURGERY
Payer: COMMERCIAL

## 2022-03-04 DIAGNOSIS — Z01.812 PRE-OPERATIVE LABORATORY EXAMINATION: ICD-10-CM

## 2022-03-04 LAB — COVID ORDER STATUS COVID19: NORMAL

## 2022-03-04 PROCEDURE — U0003 INFECTIOUS AGENT DETECTION BY NUCLEIC ACID (DNA OR RNA); SEVERE ACUTE RESPIRATORY SYNDROME CORONAVIRUS 2 (SARS-COV-2) (CORONAVIRUS DISEASE [COVID-19]), AMPLIFIED PROBE TECHNIQUE, MAKING USE OF HIGH THROUGHPUT TECHNOLOGIES AS DESCRIBED BY CMS-2020-01-R: HCPCS

## 2022-03-04 PROCEDURE — U0005 INFEC AGEN DETEC AMPLI PROBE: HCPCS

## 2022-03-05 LAB
SARS-COV-2 RNA RESP QL NAA+PROBE: NOTDETECTED
SPECIMEN SOURCE: NORMAL

## 2022-03-08 ENCOUNTER — ANESTHESIA EVENT (OUTPATIENT)
Dept: SURGERY | Facility: MEDICAL CENTER | Age: 62
End: 2022-03-08
Payer: COMMERCIAL

## 2022-03-08 ENCOUNTER — ANESTHESIA (OUTPATIENT)
Dept: SURGERY | Facility: MEDICAL CENTER | Age: 62
End: 2022-03-08
Payer: COMMERCIAL

## 2022-03-08 ENCOUNTER — HOSPITAL ENCOUNTER (OUTPATIENT)
Facility: MEDICAL CENTER | Age: 62
End: 2022-03-08
Attending: SURGERY | Admitting: SURGERY
Payer: COMMERCIAL

## 2022-03-08 ENCOUNTER — APPOINTMENT (OUTPATIENT)
Dept: RADIOLOGY | Facility: MEDICAL CENTER | Age: 62
End: 2022-03-08
Attending: SURGERY
Payer: COMMERCIAL

## 2022-03-08 VITALS
BODY MASS INDEX: 24.29 KG/M2 | HEIGHT: 71 IN | RESPIRATION RATE: 18 BRPM | DIASTOLIC BLOOD PRESSURE: 71 MMHG | OXYGEN SATURATION: 96 % | WEIGHT: 173.5 LBS | SYSTOLIC BLOOD PRESSURE: 135 MMHG | HEART RATE: 87 BPM | TEMPERATURE: 97.2 F

## 2022-03-08 DIAGNOSIS — D05.11 INTRADUCTAL CARCINOMA IN SITU OF RIGHT BREAST: ICD-10-CM

## 2022-03-08 PROCEDURE — 501497 HCHG SURGICLIP: Performed by: SURGERY

## 2022-03-08 PROCEDURE — 160002 HCHG RECOVERY MINUTES (STAT): Performed by: SURGERY

## 2022-03-08 PROCEDURE — 76098 X-RAY EXAM SURGICAL SPECIMEN: CPT | Mod: RT

## 2022-03-08 PROCEDURE — 700111 HCHG RX REV CODE 636 W/ 250 OVERRIDE (IP): Performed by: STUDENT IN AN ORGANIZED HEALTH CARE EDUCATION/TRAINING PROGRAM

## 2022-03-08 PROCEDURE — 160048 HCHG OR STATISTICAL LEVEL 1-5: Performed by: SURGERY

## 2022-03-08 PROCEDURE — 88307 TISSUE EXAM BY PATHOLOGIST: CPT

## 2022-03-08 PROCEDURE — 160035 HCHG PACU - 1ST 60 MINS PHASE I: Performed by: SURGERY

## 2022-03-08 PROCEDURE — 160009 HCHG ANES TIME/MIN: Performed by: SURGERY

## 2022-03-08 PROCEDURE — 160036 HCHG PACU - EA ADDL 30 MINS PHASE I: Performed by: SURGERY

## 2022-03-08 PROCEDURE — 19281 PERQ DEVICE BREAST 1ST IMAG: CPT | Mod: RT

## 2022-03-08 PROCEDURE — 700101 HCHG RX REV CODE 250: Performed by: STUDENT IN AN ORGANIZED HEALTH CARE EDUCATION/TRAINING PROGRAM

## 2022-03-08 PROCEDURE — 160028 HCHG SURGERY MINUTES - 1ST 30 MINS LEVEL 3: Performed by: SURGERY

## 2022-03-08 PROCEDURE — 700105 HCHG RX REV CODE 258: Performed by: STUDENT IN AN ORGANIZED HEALTH CARE EDUCATION/TRAINING PROGRAM

## 2022-03-08 PROCEDURE — 501838 HCHG SUTURE GENERAL: Performed by: SURGERY

## 2022-03-08 PROCEDURE — 700102 HCHG RX REV CODE 250 W/ 637 OVERRIDE(OP): Performed by: SURGERY

## 2022-03-08 PROCEDURE — A9270 NON-COVERED ITEM OR SERVICE: HCPCS | Performed by: SURGERY

## 2022-03-08 PROCEDURE — 700111 HCHG RX REV CODE 636 W/ 250 OVERRIDE (IP)

## 2022-03-08 PROCEDURE — 160025 RECOVERY II MINUTES (STATS): Performed by: SURGERY

## 2022-03-08 PROCEDURE — 160046 HCHG PACU - 1ST 60 MINS PHASE II: Performed by: SURGERY

## 2022-03-08 PROCEDURE — 160039 HCHG SURGERY MINUTES - EA ADDL 1 MIN LEVEL 3: Performed by: SURGERY

## 2022-03-08 PROCEDURE — 700101 HCHG RX REV CODE 250: Performed by: SURGERY

## 2022-03-08 RX ORDER — BUPIVACAINE HYDROCHLORIDE AND EPINEPHRINE 5; 5 MG/ML; UG/ML
INJECTION, SOLUTION EPIDURAL; INTRACAUDAL; PERINEURAL
Status: DISCONTINUED | OUTPATIENT
Start: 2022-03-08 | End: 2022-03-08 | Stop reason: HOSPADM

## 2022-03-08 RX ORDER — SODIUM CHLORIDE, SODIUM LACTATE, POTASSIUM CHLORIDE, CALCIUM CHLORIDE 600; 310; 30; 20 MG/100ML; MG/100ML; MG/100ML; MG/100ML
INJECTION, SOLUTION INTRAVENOUS CONTINUOUS
Status: ACTIVE | OUTPATIENT
Start: 2022-03-08 | End: 2022-03-08

## 2022-03-08 RX ORDER — HYDROMORPHONE HYDROCHLORIDE 1 MG/ML
0.4 INJECTION, SOLUTION INTRAMUSCULAR; INTRAVENOUS; SUBCUTANEOUS
Status: DISCONTINUED | OUTPATIENT
Start: 2022-03-08 | End: 2022-03-08 | Stop reason: HOSPADM

## 2022-03-08 RX ORDER — OXYCODONE HCL 5 MG/5 ML
10 SOLUTION, ORAL ORAL
Status: DISCONTINUED | OUTPATIENT
Start: 2022-03-08 | End: 2022-03-08 | Stop reason: HOSPADM

## 2022-03-08 RX ORDER — ONDANSETRON 2 MG/ML
INJECTION INTRAMUSCULAR; INTRAVENOUS PRN
Status: DISCONTINUED | OUTPATIENT
Start: 2022-03-08 | End: 2022-03-08 | Stop reason: SURG

## 2022-03-08 RX ORDER — HYDROMORPHONE HYDROCHLORIDE 1 MG/ML
0.2 INJECTION, SOLUTION INTRAMUSCULAR; INTRAVENOUS; SUBCUTANEOUS
Status: DISCONTINUED | OUTPATIENT
Start: 2022-03-08 | End: 2022-03-08 | Stop reason: HOSPADM

## 2022-03-08 RX ORDER — ONDANSETRON 2 MG/ML
4 INJECTION INTRAMUSCULAR; INTRAVENOUS
Status: DISCONTINUED | OUTPATIENT
Start: 2022-03-08 | End: 2022-03-08 | Stop reason: HOSPADM

## 2022-03-08 RX ORDER — BUPIVACAINE HYDROCHLORIDE AND EPINEPHRINE 5; 5 MG/ML; UG/ML
INJECTION, SOLUTION EPIDURAL; INTRACAUDAL; PERINEURAL
Status: DISPENSED
Start: 2022-03-08 | End: 2022-03-08

## 2022-03-08 RX ORDER — DIPHENHYDRAMINE HYDROCHLORIDE 50 MG/ML
12.5 INJECTION INTRAMUSCULAR; INTRAVENOUS
Status: DISCONTINUED | OUTPATIENT
Start: 2022-03-08 | End: 2022-03-08 | Stop reason: HOSPADM

## 2022-03-08 RX ORDER — LIDOCAINE HYDROCHLORIDE 20 MG/ML
INJECTION, SOLUTION EPIDURAL; INFILTRATION; INTRACAUDAL; PERINEURAL PRN
Status: DISCONTINUED | OUTPATIENT
Start: 2022-03-08 | End: 2022-03-08 | Stop reason: SURG

## 2022-03-08 RX ORDER — SODIUM CHLORIDE, SODIUM LACTATE, POTASSIUM CHLORIDE, CALCIUM CHLORIDE 600; 310; 30; 20 MG/100ML; MG/100ML; MG/100ML; MG/100ML
INJECTION, SOLUTION INTRAVENOUS
Status: DISCONTINUED | OUTPATIENT
Start: 2022-03-08 | End: 2022-03-08 | Stop reason: SURG

## 2022-03-08 RX ORDER — MEPERIDINE HYDROCHLORIDE 25 MG/ML
12.5 INJECTION INTRAMUSCULAR; INTRAVENOUS; SUBCUTANEOUS
Status: DISCONTINUED | OUTPATIENT
Start: 2022-03-08 | End: 2022-03-08 | Stop reason: HOSPADM

## 2022-03-08 RX ORDER — ONDANSETRON 4 MG/1
4 TABLET, FILM COATED ORAL EVERY 8 HOURS PRN
Qty: 9 TABLET | Refills: 2 | Status: SHIPPED | OUTPATIENT
Start: 2022-03-08 | End: 2022-03-11

## 2022-03-08 RX ORDER — MIDAZOLAM HYDROCHLORIDE 1 MG/ML
INJECTION INTRAMUSCULAR; INTRAVENOUS PRN
Status: DISCONTINUED | OUTPATIENT
Start: 2022-03-08 | End: 2022-03-08 | Stop reason: SURG

## 2022-03-08 RX ORDER — SODIUM CHLORIDE, SODIUM LACTATE, POTASSIUM CHLORIDE, CALCIUM CHLORIDE 600; 310; 30; 20 MG/100ML; MG/100ML; MG/100ML; MG/100ML
INJECTION, SOLUTION INTRAVENOUS CONTINUOUS
Status: DISCONTINUED | OUTPATIENT
Start: 2022-03-08 | End: 2022-03-08 | Stop reason: HOSPADM

## 2022-03-08 RX ORDER — DEXAMETHASONE SODIUM PHOSPHATE 4 MG/ML
INJECTION, SOLUTION INTRA-ARTICULAR; INTRALESIONAL; INTRAMUSCULAR; INTRAVENOUS; SOFT TISSUE PRN
Status: DISCONTINUED | OUTPATIENT
Start: 2022-03-08 | End: 2022-03-08 | Stop reason: SURG

## 2022-03-08 RX ORDER — CEFAZOLIN SODIUM 1 G/3ML
INJECTION, POWDER, FOR SOLUTION INTRAMUSCULAR; INTRAVENOUS PRN
Status: DISCONTINUED | OUTPATIENT
Start: 2022-03-08 | End: 2022-03-08 | Stop reason: SURG

## 2022-03-08 RX ORDER — ALPRAZOLAM 0.25 MG/1
0.25 TABLET ORAL ONCE
Status: COMPLETED | OUTPATIENT
Start: 2022-03-08 | End: 2022-03-08

## 2022-03-08 RX ORDER — KETOROLAC TROMETHAMINE 30 MG/ML
INJECTION, SOLUTION INTRAMUSCULAR; INTRAVENOUS PRN
Status: DISCONTINUED | OUTPATIENT
Start: 2022-03-08 | End: 2022-03-08 | Stop reason: SURG

## 2022-03-08 RX ORDER — HALOPERIDOL 5 MG/ML
1 INJECTION INTRAMUSCULAR
Status: DISCONTINUED | OUTPATIENT
Start: 2022-03-08 | End: 2022-03-08 | Stop reason: HOSPADM

## 2022-03-08 RX ORDER — LIDOCAINE AND PRILOCAINE 25; 25 MG/G; MG/G
CREAM TOPICAL ONCE
Status: COMPLETED | OUTPATIENT
Start: 2022-03-08 | End: 2022-03-08

## 2022-03-08 RX ORDER — ACETAMINOPHEN 500 MG
1000 TABLET ORAL ONCE
Status: DISCONTINUED | OUTPATIENT
Start: 2022-03-08 | End: 2022-03-08 | Stop reason: HOSPADM

## 2022-03-08 RX ORDER — HYDROMORPHONE HYDROCHLORIDE 1 MG/ML
0.1 INJECTION, SOLUTION INTRAMUSCULAR; INTRAVENOUS; SUBCUTANEOUS
Status: DISCONTINUED | OUTPATIENT
Start: 2022-03-08 | End: 2022-03-08 | Stop reason: HOSPADM

## 2022-03-08 RX ORDER — MEPERIDINE HYDROCHLORIDE 25 MG/ML
INJECTION INTRAMUSCULAR; INTRAVENOUS; SUBCUTANEOUS
Status: COMPLETED
Start: 2022-03-08 | End: 2022-03-08

## 2022-03-08 RX ORDER — OXYCODONE HCL 5 MG/5 ML
5 SOLUTION, ORAL ORAL
Status: DISCONTINUED | OUTPATIENT
Start: 2022-03-08 | End: 2022-03-08 | Stop reason: HOSPADM

## 2022-03-08 RX ORDER — HYDROCODONE BITARTRATE AND ACETAMINOPHEN 5; 325 MG/1; MG/1
1 TABLET ORAL EVERY 4 HOURS PRN
Qty: 18 TABLET | Refills: 0 | Status: SHIPPED | OUTPATIENT
Start: 2022-03-08 | End: 2022-03-11

## 2022-03-08 RX ADMIN — ALPRAZOLAM 0.25 MG: 0.25 TABLET ORAL at 13:06

## 2022-03-08 RX ADMIN — PROPOFOL 200 MG: 10 INJECTION, EMULSION INTRAVENOUS at 17:06

## 2022-03-08 RX ADMIN — EPHEDRINE SULFATE 10 MG: 50 INJECTION, SOLUTION INTRAVENOUS at 18:04

## 2022-03-08 RX ADMIN — SODIUM CHLORIDE, POTASSIUM CHLORIDE, SODIUM LACTATE AND CALCIUM CHLORIDE: 600; 310; 30; 20 INJECTION, SOLUTION INTRAVENOUS at 17:02

## 2022-03-08 RX ADMIN — KETOROLAC TROMETHAMINE 30 MG: 30 INJECTION, SOLUTION INTRAMUSCULAR at 18:10

## 2022-03-08 RX ADMIN — LIDOCAINE HYDROCHLORIDE 100 MG: 20 INJECTION, SOLUTION EPIDURAL; INFILTRATION; INTRACAUDAL at 17:06

## 2022-03-08 RX ADMIN — MEPERIDINE HYDROCHLORIDE 12.5 MG: 25 INJECTION INTRAMUSCULAR; INTRAVENOUS; SUBCUTANEOUS at 18:53

## 2022-03-08 RX ADMIN — MIDAZOLAM HYDROCHLORIDE 2 MG: 1 INJECTION, SOLUTION INTRAMUSCULAR; INTRAVENOUS at 17:02

## 2022-03-08 RX ADMIN — FENTANYL CITRATE 50 MCG: 50 INJECTION, SOLUTION INTRAMUSCULAR; INTRAVENOUS at 18:10

## 2022-03-08 RX ADMIN — ONDANSETRON 4 MG: 2 INJECTION INTRAMUSCULAR; INTRAVENOUS at 18:10

## 2022-03-08 RX ADMIN — DEXAMETHASONE SODIUM PHOSPHATE 8 MG: 4 INJECTION, SOLUTION INTRA-ARTICULAR; INTRALESIONAL; INTRAMUSCULAR; INTRAVENOUS; SOFT TISSUE at 17:10

## 2022-03-08 RX ADMIN — EPHEDRINE SULFATE 10 MG: 50 INJECTION, SOLUTION INTRAVENOUS at 17:45

## 2022-03-08 RX ADMIN — CEFAZOLIN 2 G: 330 INJECTION, POWDER, FOR SOLUTION INTRAMUSCULAR; INTRAVENOUS at 17:11

## 2022-03-08 RX ADMIN — MEPERIDINE HYDROCHLORIDE 12.5 MG: 25 INJECTION INTRAMUSCULAR; INTRAVENOUS; SUBCUTANEOUS at 18:50

## 2022-03-08 RX ADMIN — LIDOCAINE AND PRILOCAINE 1 APPLICATION: 25; 25 CREAM TOPICAL at 13:07

## 2022-03-08 RX ADMIN — FENTANYL CITRATE 50 MCG: 50 INJECTION, SOLUTION INTRAMUSCULAR; INTRAVENOUS at 17:05

## 2022-03-08 ASSESSMENT — PAIN DESCRIPTION - PAIN TYPE
TYPE: SURGICAL PAIN

## 2022-03-08 ASSESSMENT — FIBROSIS 4 INDEX: FIB4 SCORE: 1.19

## 2022-03-09 LAB — PATHOLOGY CONSULT NOTE: NORMAL

## 2022-03-09 NOTE — ANESTHESIA PREPROCEDURE EVALUATION
Case: [id number] Date/Time: 03/08/22 1515    Procedures:       MASTECTOMY, PARTIAL - WIRE LOCALIZED (Right Breast)      MASTOPEXY - POSSIBLE    Anesthesia type: General    Pre-op diagnosis: DCIS RIGHT BREAST    Location: [facility] ROOM 28 / SURGERY SAME DAY [facility]    Surgeons: Josefina Aguilar M.D.          Relevant Problems   CARDIAC   (positive) Hypertension      GI   (positive) Gastroesophageal reflux disease without esophagitis       Physical Exam    Airway   Mallampati: II  TM distance: >3 FB  Neck ROM: full       Cardiovascular - normal exam  Rhythm: regular  Rate: normal  (-) murmur     Dental - normal exam           Pulmonary - normal exam  Breath sounds clear to auscultation     Abdominal    Neurological - normal exam                 Anesthesia Plan    ASA 2       Plan - general       Airway plan will be LMA          Induction: intravenous    Postoperative Plan: Postoperative administration of opioids is intended.    Pertinent diagnostic labs and testing reviewed    Informed Consent:    Anesthetic plan and risks discussed with patient.    Use of blood products discussed with: patient whom consented to blood products.

## 2022-03-09 NOTE — OR NURSING
1816 Patient arrived to PACU from OR. Report received. Patient attached to monitoring. VSS. Patient oxygenating well on 4 L via mask. Dressing assessed on chest. No drainage noted.     1835 Patient awake. No complaints of pain or nausea.     1935 Patient meets phase two criteria.     1947 RN went over discharge instructions with patients sister, Krystal. All questions answered.     1956 IV removed. Patient meets discharge criteria. VSS. Pt discharged via wheelchair by RN. Pt in possession of all personal belongings.

## 2022-03-09 NOTE — DISCHARGE INSTRUCTIONS
ACTIVITY: Rest and take it easy for the first 24 hours.  A responsible adult is recommended to remain with you during that time.  It is normal to feel sleepy.  We encourage you to not do anything that requires balance, judgment or coordination.    MILD FLU-LIKE SYMPTOMS ARE NORMAL. YOU MAY EXPERIENCE GENERALIZED MUSCLE ACHES, THROAT IRRITATION, HEADACHE AND/OR SOME NAUSEA.    FOR 24 HOURS DO NOT:  Drive, operate machinery or run household appliances.  Drink beer or alcoholic beverages.   Make important decisions or sign legal documents.    SPECIAL INSTRUCTIONS: Follow Dr. Aguilar's instructions    DIET: To avoid nausea, slowly advance diet as tolerated, avoiding spicy or greasy foods for the first day.  Add more substantial food to your diet according to your physician's instructions.  Babies can be fed formula or breast milk as soon as they are hungry.  INCREASE FLUIDS AND FIBER TO AVOID CONSTIPATION.    SURGICAL DRESSING/BATHING: Follow Dr. Aguilar's instructions    FOLLOW-UP APPOINTMENT:  A follow-up appointment should be arranged with your doctor; call to schedule.    You should CALL YOUR PHYSICIAN if you develop:  Fever greater than 101 degrees F.  Pain not relieved by medication, or persistent nausea or vomiting.  Excessive bleeding (blood soaking through dressing) or unexpected drainage from the wound.  Extreme redness or swelling around the incision site, drainage of pus or foul smelling drainage.  Inability to urinate or empty your bladder within 8 hours.  Problems with breathing or chest pain.    You should call 911 if you develop problems with breathing or chest pain.  If you are unable to contact your doctor or surgical center, you should go to the nearest emergency room or urgent care center.  Physician's telephone #: Dr Aguilar [phone]    If any questions arise, call your doctor.  If your doctor is not available, please feel free to call the Surgical Center at [phone].     A registered nurse may  call you a few days after your surgery to see how you are doing after your procedure.    MEDICATIONS: Resume taking daily medication.  Take prescribed pain medication with food.  If no medication is prescribed, you may take non-aspirin pain medication if needed.  PAIN MEDICATION CAN BE VERY CONSTIPATING.  Take a stool softener or laxative such as senokot, pericolace, or milk of magnesia if needed.    Prescription given for NORCO and Zofran.  Last pain medication given at ________.    If your physician has prescribed pain medication that includes Acetaminophen (Tylenol), do not take additional Acetaminophen (Tylenol) while taking the prescribed medication.    Depression / Suicide Risk    As you are discharged from this [facility] facility, it is important to learn how to keep safe from harming yourself.    Recognize the warning signs:  · Abrupt changes in personality, positive or negative- including increase in energy   · Giving away possessions  · Change in eating patterns- significant weight changes-  positive or negative  · Change in sleeping patterns- unable to sleep or sleeping all the time   · Unwillingness or inability to communicate  · Depression  · Unusual sadness, discouragement and loneliness  · Talk of wanting to die  · Neglect of personal appearance   · Rebelliousness- reckless behavior  · Withdrawal from people/activities they love  · Confusion- inability to concentrate     If you or a loved one observes any of these behaviors or has concerns about self-harm, here's what you can do:  · Talk about it- your feelings and reasons for harming yourself  · Remove any means that you might use to hurt yourself (examples: pills, rope, extension cords, firearm)  · Get professional help from the community (Mental Health, Substance Abuse, psychological counseling)  · Do not be alone:Call your Safe Contact- someone whom you trust who will be there for you.  · Call your local CRISIS HOTLINE 360-9797 or  232-631-5754  · Call your local Children's Mobile Crisis Response Team [place] (018) 236-9066 or www.[employer]  · Call the toll free National Suicide Prevention Hotlines   · National Suicide Prevention Lifeline 669-784-CKNC (9322)  · National [employer] Line Network 800-SUICIDE (893-0324)

## 2022-03-09 NOTE — ANESTHESIA TIME REPORT
Anesthesia Start and Stop Event Times     Date Time Event    3/8/2022 1620 Ready for Procedure     1702 Anesthesia Start     1817 Anesthesia Stop        Responsible Staff  03/08/22    Name Role Begin End    Lucas Desouza M.D. Anesth 1702 1817        Preop Diagnosis (Free Text):  Pre-op Diagnosis     DCIS RIGHT BREAST        Preop Diagnosis (Codes):    Premium Reason  A. 3PM - 7AM    Comments:

## 2022-03-09 NOTE — OP REPORT
Pre op diagnosis:  DCIS of the right breast  Post op diagnosis:  Same    Procedure:   1.  Wire localized partial mastectomy  2.  Donut mastopexy    Surgeon:  Josefina Aguilar MD  Anesthesiologist:  Lucas Desouza MD    Anesthesia:  General  Pre op meds:  Ancef  ASA 2    Indications:  This is a 62 year old female with IG/HG DCIS.  After extensive discussion regarding risks and benefits of her options, she is ready to proceed with surgery.    Findings:  Targeted clip and wire tip present.  No residual calcifications per radiologist.  Residual parenchyma in all border including thin rim of posterior.    Summary:  The patient underwent wire localization by Radiology, then was anesthetized, prepped, and draped in sterile fashion.  Time out was confirmed.  Local anesthetic was injected prior to all incisions.      I made a curved incision in the superior areolar border.  I dissected along her anterior mammary fascial plane to the area localized by the wire.  This was excised as a somewhat cylindrical specimen with the wire and clip located centrally.  Usual orienting sutures were placed and specimen mammogram was performed.  I irrigated and ensured hemostasis, then placed marking clips.  I confirmed with the radiologist that there were no residual calcifications present at the time of wire localization, so the targeted lesion was removed.      I placed 2-0 vicryls to create a minor lattice, but the breast tissue was more fatty then glandular, and did not lend itself well to holding sutures.  Closure of the incision directly was noted to leave a contour defect superiorly in the breast.  I felt that a mastopexy could provide a better aesthetic outcome, and this was the larger breast to begin with.  I therefore made a concentric wider incision and de-epithelialized between.  The larger outer incision was advanced with adjacent vascularized skin to the small inner incision to decrease the breast envelope and minimize contour defect  and skin redundancy.      3-0 monocryls were used to close the deep dermal layer, and 4-0 Stratafix was used to close the skin.  Dressings were placed and I was informed all counts were correct.    CC: MD Suzanne Mclaughlin MD

## 2022-03-09 NOTE — ANESTHESIA PROCEDURE NOTES
Airway    Date/Time: 3/8/2022 5:07 PM  Performed by: Lucas Desouza M.D.  Authorized by: Lucas Desouza M.D.     Location:  OR  Urgency:  Elective  Indications for Airway Management:  Anesthesia      Spontaneous Ventilation: absent    Sedation Level:  Deep  Preoxygenated: Yes    Final Airway Type:  Supraglottic airway  Final Supraglottic Airway:  Standard LMA    SGA Size:  4  Number of Attempts at Approach:  1

## 2022-03-09 NOTE — ANESTHESIA POSTPROCEDURE EVALUATION
Patient: Mariza Steward    Procedure Summary     Date: 03/08/22 Room / Location: [facility] ROOM 28 / SURGERY SAME DAY [facility]    Anesthesia Start: 1702 Anesthesia Stop: 1817    Procedures:       MASTECTOMY, PARTIAL - WIRE LOCALIZED (Right Breast)      MASTOPEXY (Right Breast) Diagnosis: (DCIS RIGHT BREAST)    Surgeons: Josefina Aguilar M.D. Responsible Provider: Lucas Desouza M.D.    Anesthesia Type: general ASA Status: 2          Final Anesthesia Type: general  Last vitals  BP   Blood Pressure: 135/71    Temp   36.2 °C (97.2 °F)    Pulse   87   Resp   18    SpO2   96 %      Anesthesia Post Evaluation    Patient location during evaluation: PACU  Patient participation: complete - patient participated  Level of consciousness: awake and alert    Airway patency: patent  Anesthetic complications: no  Cardiovascular status: hemodynamically stable  Respiratory status: acceptable  Hydration status: euvolemic    PONV: none          No complications documented.     Nurse Pain Score: 1 (NPRS)

## 2022-03-10 DIAGNOSIS — I15.9 SECONDARY HYPERTENSION, UNSPECIFIED: ICD-10-CM

## 2022-03-10 PROCEDURE — RXMED WILLOW AMBULATORY MEDICATION CHARGE: Performed by: INTERNAL MEDICINE

## 2022-03-10 RX ORDER — PROPRANOLOL HYDROCHLORIDE 20 MG/1
20 TABLET ORAL
Qty: 90 TABLET | Refills: 3 | Status: SHIPPED | OUTPATIENT
Start: 2022-03-10 | End: 2023-02-27 | Stop reason: SDUPTHER

## 2022-03-10 RX ORDER — LISINOPRIL 20 MG/1
20 TABLET ORAL
Qty: 90 TABLET | Refills: 3 | Status: SHIPPED | OUTPATIENT
Start: 2022-03-10 | End: 2023-02-27 | Stop reason: SDUPTHER

## 2022-03-16 ENCOUNTER — PHARMACY VISIT (OUTPATIENT)
Dept: PHARMACY | Facility: MEDICAL CENTER | Age: 62
End: 2022-03-16
Payer: COMMERCIAL

## 2022-03-29 ENCOUNTER — HOSPITAL ENCOUNTER (OUTPATIENT)
Dept: RADIATION ONCOLOGY | Facility: MEDICAL CENTER | Age: 62
End: 2022-03-31
Attending: RADIOLOGY
Payer: COMMERCIAL

## 2022-03-29 VITALS — OXYGEN SATURATION: 98 % | SYSTOLIC BLOOD PRESSURE: 146 MMHG | DIASTOLIC BLOOD PRESSURE: 84 MMHG | HEART RATE: 67 BPM

## 2022-03-29 DIAGNOSIS — C50.211 MALIGNANT NEOPLASM OF UPPER-INNER QUADRANT OF RIGHT BREAST IN FEMALE, ESTROGEN RECEPTOR POSITIVE (HCC): ICD-10-CM

## 2022-03-29 DIAGNOSIS — Z17.0 MALIGNANT NEOPLASM OF UPPER-INNER QUADRANT OF RIGHT BREAST IN FEMALE, ESTROGEN RECEPTOR POSITIVE (HCC): ICD-10-CM

## 2022-03-29 PROCEDURE — 99214 OFFICE O/P EST MOD 30 MIN: CPT | Performed by: RADIOLOGY

## 2022-03-29 PROCEDURE — 99205 OFFICE O/P NEW HI 60 MIN: CPT | Performed by: RADIOLOGY

## 2022-03-29 PROCEDURE — 99417 PROLNG OP E/M EACH 15 MIN: CPT | Performed by: RADIOLOGY

## 2022-03-29 ASSESSMENT — PAIN SCALES - GENERAL: PAINLEVEL: NO PAIN

## 2022-03-29 NOTE — CONSULTS
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 Steward today in consultation at the request of Dr. Aguilar for her breast cancer.  Patient is a 62-year-old woman who had not had any mammography in nearly 5 years.  However did have a mammogram this year that showed new microcalcifications in the right upper inner quadrant of her breast.  Diagnostic mammogram showed similar microcalcifications.  There was no ultrasound equivalent component.  Biopsy of this area showed an intermediate to high-grade ductal carcinoma in situ which was ER positive IL weakly positive.  Of note on specimen mammogram the majority of the calcifications were removed with biopsy.  Patient elected to proceed with breast conservation surgery and had a lumpectomy with mastopexy on the right side on 3/8/2022.  This only showed a residual 3 mm component of DCIS.  Negative surgical margins were achieved.  Again the radiographic component spanned a roughly 1.5 cm area but the majority of calcifications were removed during the biopsy specimen.  Patient presents today to further discuss her adjuvant treatment options.  She does work here at [employer] in the [facility].  She is a well-informed educated patient who is done a great deal of research on her own.  She is gone to what I would consider trusted sources.  The data that she does quote seems well understood.  That being said she admittedly comes in with a bias against any further adjuvant treatment either in the form of aromatase inhibitor or radiation.  However she is willing to hear an informed discussion.    PAST MEDICAL HISTORY:   Past Medical History:   Diagnosis Date   • Anesthesia 02/24/2022    PONV, chills/shaking, motion sickness   •  Anxiety and depression 2018   • Breast cancer (DCIS), right upper medial  2022   • Cancer (HCC) 2022    possible right breast CA   • Cervicalgia    • Depression with anxiety 2022   • Gastroesophageal reflux disease without esophagitis 2018   • Generalized anxiety disorder 2018   • GERD (gastroesophageal reflux disease)    • Heart burn 2022    medicated prn   • High cholesterol 2022    non medicated   • Hypertension 2022    medicated   • Indigestion 2022    medicated prn   • Pain 2022    low back pain   • PONV (postoperative nausea and vomiting) 2022       PAST SURGICAL HISTORY:  Past Surgical History:   Procedure Laterality Date   • PB MASTECTOMY, PARTIAL Right 3/8/2022    Procedure: MASTECTOMY, PARTIAL - WIRE LOCALIZED;  Surgeon: Josefina Aguilar M.D.;  Location: SURGERY SAME DAY [facility];  Service: General   • NC SUSPENSION OF BREAST Right 3/8/2022    Procedure: MASTOPEXY;  Surgeon: Josefina Aguilar M.D.;  Location: SURGERY SAME DAY [facility];  Service: General   • HYSTERECTOMY, VAGINAL     • OTHER  1963    tonsillectomy       GYNECOLOGICAL STATUS:  : 0    HORMONE USE:  Patient did not use any oral contraceptive pills, she used hormone replacement therapy for less than a year, she had surgical menopause in  from hysterectomy, she had menarche at age 12    CURRENT MEDICATIONS:  Current Outpatient Medications   Medication Sig Dispense Refill   • lisinopril (PRINIVIL) 20 MG Tab Take 1 Tablet by mouth every day. 90 Tablet 3   • propranolol (INDERAL) 20 MG Tab Take 1 Tablet by mouth every day. 90 Tablet 3   • Cholecalciferol (VITAMIN D3 PO) Take 400 Units by mouth. Plus calcium 600 mg     • acetaminophen (TYLENOL) 500 MG Tab Take 500-1,000 mg by mouth every 6 hours as needed.     • Al Hyd-Mg Tr-Alg Ac-Sod Bicarb (GAVISCON-2 PO) Take  by mouth every day.     • loratadine (CLARITIN) 10 MG Tab Take 10 mg by mouth as needed.     • Vitamin D,  Cholecalciferol, 25 MCG (1000 UT) Cap Take 3 Capsules by mouth every day. 100 Capsule 11   • triamcinolone acetonide (KENALOG) 0.1 % Cream Apply 1 Application to affected area(s) 2 times a day as needed.     • potassium chloride SA (K-DUR) 10 MEQ Tab CR TAKE 1 TABLET BY MOUTH EVERY DAY 90 Tab 3   • Magnesium 200 MG Tab Take 200 mg by mouth. (Patient not taking: No sig reported)       No current facility-administered medications for this encounter.       ALLERGIES:    Flexeril [cyclobenzaprine]    FAMILY HISTORY:    Family History   Problem Relation Age of Onset   • Hypertension Mother    • Stroke Mother    • Hyperlipidemia Mother    • Heart Disease Father         colon   • Colon Cancer Paternal Grandmother    • Cancer Paternal Grandmother         Colon   • Cancer Paternal Aunt         breast   • Genetic Disorder Neg Hx    • Psychiatric Illness Neg Hx    • Thyroid Neg Hx    • Diabetes Neg Hx    • Dementia Neg Hx      Declined genetic testing    SOCIAL HISTORY:    Social History     Tobacco Use   • Smoking status: Never Smoker   • Smokeless tobacco: Never Used   Vaping Use   • Vaping Use: Never used   Substance Use Topics   • Alcohol use: Yes     Alcohol/week: 0.5 oz     Types: 1 drink(s) per week     Comment: rare   • Drug use: No     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  All are negative with relationship to this diagnosis with the exception of:  Patient is healing well from surgery, does not have any suspicious lesions in the breast or axilla, denies any acute areas of bony tenderness or deformity, denies any new headaches or neurologic symptoms     PHYSICAL EXAM:    Vitals:    22 1327   BP: 146/84   Pulse: 67   SpO2: 98%   Pain Score: No pain    ECO= Fully active, able to carry on all pre-disease performance without  restriction.    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  I first distinguished for the patient the difference between invasive ductal carcinoma and ductal carcinoma in situ.  I did address some of the data she was referencing into the controversy surrounding management of ductal carcinoma in situ.  I stated we have a less predictive tool for adjuvant management of DCIS.  I went over some of the genomic tools that are currently in use but some of the challenges with the validation studies.  This is a test I would be willing to order for her if she felt this would help with her decision making.  After discussing it thoroughly she did not feel inclined to pursue genomic testing.  We did go over the data for mastectomy versus lumpectomy and radiation.  We went over lumpectomy with and without radiation.  We went over the patterns of local recurrence both invasive and ductal carcinoma in situ.  We did have an in-depth discussion about acute, subacute and chronic risks of radiation.  She was interested in some of the technological advances of how we addressed quality and safety for delivery of radiation therapy.  After our discussion she is more inclined to consider radiation.  She does have an upcoming trip to [place] in late April early May.  Therefore we currently have her plan for simulation prior to her leaving for her trip and would initiate treatment after her return.  She will also keep her appointment with Dr. Up to at least discuss an aromatase inhibitor before making her final decision.      We discussed the risks, benefits and side effects of treatment and the patient is amenable  to treatment.  If patient has any questions or concerns, she should feel free to contact me.    Thank you for the opportunity to participate in her care.  If any questions or comments, please do not hesitate in calling.

## 2022-03-29 NOTE — NON-PROVIDER
Patient was seen today in clinic with Dr. Alfaro for Consult.  Vitals signs and weight were obtained and pain assessment was completed.  Allergies and medications were reviewed with the patient.  Toxicities of treatment assessed.     Vitals/Pain:  Vitals:    03/29/22 1327   BP: 146/84   Pulse: 67   SpO2: 98%   Pain Score: No pain        Allergies:   Flexeril [cyclobenzaprine]    Current Medications:  Current Outpatient Medications   Medication Sig Dispense Refill   • lisinopril (PRINIVIL) 20 MG Tab Take 1 Tablet by mouth every day. 90 Tablet 3   • propranolol (INDERAL) 20 MG Tab Take 1 Tablet by mouth every day. 90 Tablet 3   • Cholecalciferol (VITAMIN D3 PO) Take 400 Units by mouth. Plus calcium 600 mg     • acetaminophen (TYLENOL) 500 MG Tab Take 500-1,000 mg by mouth every 6 hours as needed.     • Al Hyd-Mg Tr-Alg Ac-Sod Bicarb (GAVISCON-2 PO) Take  by mouth every day.     • loratadine (CLARITIN) 10 MG Tab Take 10 mg by mouth as needed.     • Vitamin D, Cholecalciferol, 25 MCG (1000 UT) Cap Take 3 Capsules by mouth every day. 100 Capsule 11   • Calcium Carbonate 500 MG Chew Tab Chew 2 Tablets every day. 100 Tablet 11   • hydroCHLOROthiazide (HYDRODIURIL) 12.5 MG tablet TAKE 1 TABLET BY MOUTH EVERY DAY (Patient not taking: No sig reported) 90 Tablet 0   • triamcinolone acetonide (KENALOG) 0.1 % Cream Apply 1 Application to affected area(s) 2 times a day as needed.     • potassium chloride SA (K-DUR) 10 MEQ Tab CR TAKE 1 TABLET BY MOUTH EVERY DAY 90 Tab 3   • Magnesium 200 MG Tab Take 200 mg by mouth. (Patient not taking: No sig reported)       No current facility-administered medications for this encounter.         PCP:  Sakina Nguyen, Jace Ass't

## 2022-03-29 NOTE — CT SIMULATION
PATIENT NAME Mariza Steward   PRIMARY PHYSICIAN Chaitanya Presley [id number]   REFERRING PHYSICIAN Josefina Aguilar M.D. 1960     Breast cancer (DCIS), right upper medial (Jan. 2022)  Staging form: Breast, AJCC 8th Edition  - Pathologic: Stage 0 (pTis (DCIS), cN0, cM0, G2, ER+, VT+) - Signed by David Alfaro M.D. on 3/28/2022  Histologic grading system: 3 grade system         Treatment Planning CT Simulation      Order Questions     Question Answer Comment    Is this for a new course of treatment? Yes     Is this an Addendum? No     Implanted Device/Pacemaker No     Simulation Status Initial     Treatment Site Breast     Laterality Right     Treatment Technique 3D CRT     Other Technique(s) 3D     Treatment Pattern/Frequency Daily     Concurrent Chemotherapy No     CT Technique 3D     Slice Thickness 3mm     Scan Extent Chest     Bowel Preparation No     Treatment Device(s) Vac Vera      Wing Board     Patient Attire Gown     Patient Position Supine     Patient Orientation Head First     Arm Position Up     Treatment Machine No preference     Treatment Image Guidance Ports      CBCT     Frequency (ports) Weekly     Frequency (CBCT) Daily for boost    Other Orders Weekly Physics Check

## 2022-04-01 ENCOUNTER — HOSPITAL ENCOUNTER (OUTPATIENT)
Dept: RADIATION ONCOLOGY | Facility: MEDICAL CENTER | Age: 62
End: 2022-04-30
Attending: RADIOLOGY
Payer: COMMERCIAL

## 2022-04-04 ENCOUNTER — PATIENT OUTREACH (OUTPATIENT)
Dept: OTHER | Facility: MEDICAL CENTER | Age: 62
End: 2022-04-04
Payer: COMMERCIAL

## 2022-04-04 NOTE — PROGRESS NOTES
Oncology Nurse navigator (ONN) Meri Lee reached out to patient for follow up. Patient states she met with Dr. Alfaro 3/29/22 & discussed plan of care. She discussed Oncotype DX testing & spoke with ZOHAIB Donnelly regarding costs involved.  She is concerned of potential XRT risks as discussed with Dr. Alfaro.  She is interested in new patient visit appointment with Dr. Up to discuss post surgery option but is concerned about side effects of the medications.  Patient requests follow up with ZOHAIB for additional information and agrees to follow up call from ONN for update and review of plan of care moving forward.   I have answered all of the patients questions and she denies any barriers to care at this time.

## 2022-04-18 ENCOUNTER — PATIENT MESSAGE (OUTPATIENT)
Dept: INTERNAL MEDICINE | Facility: IMAGING CENTER | Age: 62
End: 2022-04-18
Payer: COMMERCIAL

## 2022-04-18 PROCEDURE — RXMED WILLOW AMBULATORY MEDICATION CHARGE: Performed by: INTERNAL MEDICINE

## 2022-04-18 RX ORDER — POTASSIUM CHLORIDE 750 MG/1
10 TABLET, EXTENDED RELEASE ORAL
Qty: 90 TABLET | Refills: 3 | Status: SHIPPED | OUTPATIENT
Start: 2022-04-18 | End: 2023-08-20 | Stop reason: SDUPTHER

## 2022-04-18 NOTE — TELEPHONE ENCOUNTER
----- Message from Mariza Steward sent at 4/18/2022  6:16 AM PDT -----  Regarding: Potassium refill  Good morning -    Could I get  a refill on my potassium prescription?     Thanks so much, have a great day    Mariza

## 2022-04-19 ENCOUNTER — PHARMACY VISIT (OUTPATIENT)
Dept: PHARMACY | Facility: MEDICAL CENTER | Age: 62
End: 2022-04-19
Payer: COMMERCIAL

## 2022-04-19 ENCOUNTER — OFFICE VISIT (OUTPATIENT)
Dept: HEMATOLOGY ONCOLOGY | Facility: MEDICAL CENTER | Age: 62
End: 2022-04-19
Payer: COMMERCIAL

## 2022-04-19 VITALS
SYSTOLIC BLOOD PRESSURE: 144 MMHG | BODY MASS INDEX: 24.63 KG/M2 | OXYGEN SATURATION: 95 % | TEMPERATURE: 97.4 F | HEIGHT: 71 IN | HEART RATE: 81 BPM | WEIGHT: 175.93 LBS | DIASTOLIC BLOOD PRESSURE: 86 MMHG | RESPIRATION RATE: 18 BRPM

## 2022-04-19 DIAGNOSIS — C50.211 MALIGNANT NEOPLASM OF UPPER-INNER QUADRANT OF RIGHT BREAST IN FEMALE, ESTROGEN RECEPTOR POSITIVE (HCC): ICD-10-CM

## 2022-04-19 DIAGNOSIS — Z17.0 MALIGNANT NEOPLASM OF UPPER-INNER QUADRANT OF RIGHT BREAST IN FEMALE, ESTROGEN RECEPTOR POSITIVE (HCC): ICD-10-CM

## 2022-04-19 PROCEDURE — 99204 OFFICE O/P NEW MOD 45 MIN: CPT | Performed by: INTERNAL MEDICINE

## 2022-04-19 RX ORDER — TAMOXIFEN CITRATE 10 MG/1
5 TABLET ORAL DAILY
Qty: 60 TABLET | Refills: 3 | Status: SHIPPED | OUTPATIENT
Start: 2022-04-19 | End: 2023-02-27

## 2022-04-19 ASSESSMENT — ENCOUNTER SYMPTOMS
CARDIOVASCULAR NEGATIVE: 1
GASTROINTESTINAL NEGATIVE: 1
PSYCHIATRIC NEGATIVE: 1
RESPIRATORY NEGATIVE: 1
NEUROLOGICAL NEGATIVE: 1
CONSTITUTIONAL NEGATIVE: 1
EYES NEGATIVE: 1
MUSCULOSKELETAL NEGATIVE: 1

## 2022-04-19 ASSESSMENT — PAIN SCALES - GENERAL: PAINLEVEL: NO PAIN

## 2022-04-19 ASSESSMENT — FIBROSIS 4 INDEX: FIB4 SCORE: 1.19

## 2022-04-19 NOTE — PROGRESS NOTES
Consult:  Hematology/Oncology      Referring Physician: Josefina Aguilar MD  Primary Care:  Chaitanya Presley M.D.    Diagnosis: DCIS of the right breast   Chief Complaint: DCIS of the right breast    History of Presenting Illness:  Mariza Steward is a delightful 62-year-old postmenopausal female who had an abnormal mammogram on routine imaging in December 2021.  On 1/26/2022 she underwent a stereotactic core biopsy of the right breast.  Pathology demonstrated DCIS, intermediate to high grade, with some comedonecrosis, ER 80% positive, IL 5% positive.  On 3/8/2022 she underwent a partial mastectomy with mamma pexy by Dr. Aguilar of the right breast.  Pathology demonstrated ductal carcinoma in situ, intermediate nuclear grade with clear margins, 3 mm in greatest dimension.  Postoperative course has been unremarkable.  She met with Dr. Alfaro to discuss radiation therapy but ultimately declined treatment.  She comes in today to discuss endocrine therapy for risk reduction of recurrent disease.  Of note she had a relatively difficult time with menopause and was on hormone replacement therapy for a brief period of time.  She does have occasional hot flashes but no night sweats and no other symptoms.      Past Medical History:   Diagnosis Date   • Anesthesia 02/24/2022    PONV, chills/shaking, motion sickness   • Anxiety and depression 12/12/2018   • Breast cancer (DCIS), right upper medial  1/28/2022   • Cancer (HCC) 02/24/2022    possible right breast CA   • Cervicalgia    • Depression with anxiety 02/24/2022   • Gastroesophageal reflux disease without esophagitis 12/12/2018   • Generalized anxiety disorder 12/12/2018   • GERD (gastroesophageal reflux disease)    • Heart burn 02/24/2022    medicated prn   • High cholesterol 02/24/2022    non medicated   • Hypertension 02/24/2022    medicated   • Indigestion 02/24/2022    medicated prn   • Pain 02/24/2022    low back pain   • PONV (postoperative nausea and vomiting)  02/24/2022       Past Surgical History:   Procedure Laterality Date   • PB MASTECTOMY, PARTIAL Right 3/8/2022    Procedure: MASTECTOMY, PARTIAL - WIRE LOCALIZED;  Surgeon: Josefina Aguilar M.D.;  Location: SURGERY SAME DAY [facility];  Service: General   • MI SUSPENSION OF BREAST Right 3/8/2022    Procedure: MASTOPEXY;  Surgeon: Josefina Aguilar M.D.;  Location: SURGERY SAME DAY [facility];  Service: General   • HYSTERECTOMY, VAGINAL  2005   • OTHER  1963    tonsillectomy       Social History     Socioeconomic History   • Marital status: Single     Spouse name: Not on file   • Number of children: Not on file   • Years of education: Not on file   • Highest education level: Not on file   Occupational History   • Not on file   Tobacco Use   • Smoking status: Never Smoker   • Smokeless tobacco: Never Used   Vaping Use   • Vaping Use: Never used   Substance and Sexual Activity   • Alcohol use: Yes     Alcohol/week: 0.5 oz     Types: 1 drink(s) per week     Comment: rare   • Drug use: No   • Sexual activity: Not on file   Other Topics Concern   •  Service No   • Blood Transfusions No   • Caffeine Concern No   • Occupational Exposure No   • Hobby Hazards No   • Sleep Concern Yes   • Stress Concern No   • Weight Concern Yes   • Special Diet No   • Back Care No   • Exercise No   • Bike Helmet No   • Seat Belt Yes   • Self-Exams No   Social History Narrative    .     Children: no.     Work: [employer],      Social Determinants of Health     Financial Resource Strain: Not on file   Food Insecurity: Not on file   Transportation Needs: Not on file   Physical Activity: Not on file   Stress: Not on file   Social Connections: Not on file   Intimate Partner Violence: Not on file   Housing Stability: Not on file       Family History   Problem Relation Age of Onset   • Hypertension Mother    • Stroke Mother    • Hyperlipidemia Mother    • Heart Disease Father         colon   • Colon Cancer Paternal Grandmother    •  Cancer Paternal Grandmother         Colon   • Cancer Paternal Aunt         breast   • Genetic Disorder Neg Hx    • Psychiatric Illness Neg Hx    • Thyroid Neg Hx    • Diabetes Neg Hx    • Dementia Neg Hx        OB History   No obstetric history on file.       Allergies as of 04/19/2022 - Reviewed 04/19/2022   Allergen Reaction Noted   • Flexeril [cyclobenzaprine]  10/28/2019         Current Outpatient Medications:   •  potassium chloride SA (K-DUR) 10 MEQ Tab CR, Take 1 Tablet by mouth every day., Disp: 90 Tablet, Rfl: 3  •  lisinopril (PRINIVIL) 20 MG Tab, Take 1 Tablet by mouth every day., Disp: 90 Tablet, Rfl: 3  •  propranolol (INDERAL) 20 MG Tab, Take 1 Tablet by mouth every day., Disp: 90 Tablet, Rfl: 3  •  Cholecalciferol (VITAMIN D3 PO), Take 400 Units by mouth. Plus calcium 600 mg, Disp: , Rfl:   •  acetaminophen (TYLENOL) 500 MG Tab, Take 500-1,000 mg by mouth every 6 hours as needed., Disp: , Rfl:   •  Al Hyd-Mg Tr-Alg Ac-Sod Bicarb (GAVISCON-2 PO), Take  by mouth every day., Disp: , Rfl:   •  loratadine (CLARITIN) 10 MG Tab, Take 10 mg by mouth as needed., Disp: , Rfl:   •  Vitamin D, Cholecalciferol, 25 MCG (1000 UT) Cap, Take 3 Capsules by mouth every day., Disp: 100 Capsule, Rfl: 11  •  triamcinolone acetonide (KENALOG) 0.1 % Cream, Apply 1 Application to affected area(s) 2 times a day as needed., Disp: , Rfl:   •  Magnesium 200 MG Tab, Take 200 mg by mouth., Disp: , Rfl:     Review of Systems:  Review of Systems   Constitutional: Negative.    HENT: Negative.    Eyes: Negative.    Respiratory: Negative.    Cardiovascular: Negative.    Gastrointestinal: Negative.    Genitourinary: Negative.    Musculoskeletal: Negative.    Skin: Negative.    Neurological: Negative.    Endo/Heme/Allergies: Negative.    Psychiatric/Behavioral: Negative.           Physical Exam:  Vitals:    04/19/22 1228   BP: 144/86   Pulse: 81   Resp: 18   Temp: 36.3 °C (97.4 °F)   TempSrc: Temporal   SpO2: 95%   Weight: 79.8 kg (175 lb  "14.8 oz)   Height: 1.803 m (5' 11\")       DESC; KARNOFSKY SCALE WITH ECOG EQUIVALENT: 100, Fully active, able to carry on all pre-disease performed without restriction (ECOG equivalent 0)    Detailed physical exam was not performed today  Labs:  No visits with results within 1 Month(s) from this visit.   Latest known visit with results is:   Admission on 03/08/2022, Discharged on 03/08/2022   Component Date Value Ref Range Status   • Pathology Request 03/09/2022 Sent to Histo   Final       Imaging:   All listed images below have been independently reviewed by me. I agree with the findings as summarized below:    No results found.     Pathology:  SURGICAL PATHOLOGY CONSULTATION     FINAL DIAGNOSIS:     A. Right upper partial mastectomy:          Ductal carcinoma in situ, intermediate nuclear grade          Negative for invasive carcinoma          Margins of resection are negative for DCIS, see synoptic for           margin details          Biopsy and clip site identified     Synoptic Report for DCIS of the Breast:          SPECIMEN        -Procedure: Excision (less than total mastectomy)        -Specimen Laterality: Right        TUMOR        -Tumor Site: Upper        -Histologic Type: Ductal carcinoma in situ        -Size (Extent) of DCIS: 3 mm in greatest dimension        -Nuclear Grade: Grade II (intermediate)        -Necrosis: Not identified        -Microcalcifications: Not identified        MARGINS        -Margin Status: All margins negative for DCIS         Distance from closest margin: 1 mm from anterior margin over a         breadth of 2 mm        REGIONAL LYMPH NODES        -Regional Lymph Node Status: No lymph nodes submitted or found        DISTANT METASTASIS        -Distant Site(s) Involved: Not applicable        PATHOLOGIC STAGE CLASSIFICATION         -Primary Tumor: pTis (DCIS)         -Regional Lymph Nodes: pNX         -Distant Metastasis: Not applicable        ADDITIONAL FINDINGS        -Additional " Findings: Biopsy and clip site identified on gross         examination        SPECIAL STUDIES        -Breast Biomarker Testing: Performed on previous specimen,         [id number] from 1/26/22:          Estrogen Receptor (ER): Positive, >90%, strong          Progesterone Receptor (PgR): Low positive, 5%, moderate                                           Diagnosis performed by:                                       DIANA STEPHENSON DO     Impression:  #1.  DCIS of the right breast, intermediate grade, 3 mm in greatest dimension with clear margins status post lobectomy.  She has declined radiation therapy.  She is a good candidate for endocrine therapy.  We had a long discussion about the risks and benefits of this.  She is agreeable to try low-dose tamoxifen based on the results of the [place] study.  We will initiate 5 mg daily and see her back in approximately 2 months to assess her tolerance of therapy.      Any questions and concerns raised by the patient were answered to the best of my ability. Thank you for allowing me to participate in the care for this patient. Please feel free to contact me for any questions or concerns.

## 2022-06-07 PROCEDURE — RXMED WILLOW AMBULATORY MEDICATION CHARGE: Performed by: INTERNAL MEDICINE

## 2022-06-14 ENCOUNTER — PHARMACY VISIT (OUTPATIENT)
Dept: PHARMACY | Facility: MEDICAL CENTER | Age: 62
End: 2022-06-14
Payer: COMMERCIAL

## 2022-06-29 ENCOUNTER — APPOINTMENT (OUTPATIENT)
Dept: HEMATOLOGY ONCOLOGY | Facility: MEDICAL CENTER | Age: 62
End: 2022-06-29
Payer: COMMERCIAL

## 2022-09-09 ENCOUNTER — APPOINTMENT (OUTPATIENT)
Dept: PHYSICAL MEDICINE AND REHAB | Facility: MEDICAL CENTER | Age: 62
End: 2022-09-09
Payer: COMMERCIAL

## 2022-09-12 PROCEDURE — RXMED WILLOW AMBULATORY MEDICATION CHARGE: Performed by: INTERNAL MEDICINE

## 2022-09-13 ENCOUNTER — PHARMACY VISIT (OUTPATIENT)
Dept: PHARMACY | Facility: MEDICAL CENTER | Age: 62
End: 2022-09-13
Payer: COMMERCIAL

## 2022-11-01 ENCOUNTER — NON-PROVIDER VISIT (OUTPATIENT)
Dept: INTERNAL MEDICINE | Facility: IMAGING CENTER | Age: 62
End: 2022-11-01
Payer: COMMERCIAL

## 2022-11-01 DIAGNOSIS — Z23 NEED FOR INFLUENZA VACCINATION: ICD-10-CM

## 2022-11-01 PROCEDURE — 90686 IIV4 VACC NO PRSV 0.5 ML IM: CPT | Performed by: INTERNAL MEDICINE

## 2022-11-01 PROCEDURE — RXMED WILLOW AMBULATORY MEDICATION CHARGE: Performed by: INTERNAL MEDICINE

## 2022-11-01 PROCEDURE — 90471 IMMUNIZATION ADMIN: CPT | Performed by: INTERNAL MEDICINE

## 2022-11-01 RX ORDER — TRIAMCINOLONE ACETONIDE 1 MG/G
1 CREAM TOPICAL 2 TIMES DAILY PRN
Qty: 30 G | Refills: 1 | Status: SHIPPED | OUTPATIENT
Start: 2022-11-01 | End: 2022-11-26

## 2022-11-03 DIAGNOSIS — Z86.39 HISTORY OF VITAMIN D DEFICIENCY: ICD-10-CM

## 2022-11-03 DIAGNOSIS — Z00.00 WELLNESS EXAMINATION: ICD-10-CM

## 2022-11-03 DIAGNOSIS — Z85.3 HISTORY OF BREAST CANCER: ICD-10-CM

## 2022-11-03 DIAGNOSIS — K21.9 GASTROESOPHAGEAL REFLUX DISEASE WITHOUT ESOPHAGITIS: ICD-10-CM

## 2022-11-03 DIAGNOSIS — I10 ESSENTIAL HYPERTENSION: ICD-10-CM

## 2022-11-11 ENCOUNTER — PHARMACY VISIT (OUTPATIENT)
Dept: PHARMACY | Facility: MEDICAL CENTER | Age: 62
End: 2022-11-11
Payer: COMMERCIAL

## 2022-12-05 ENCOUNTER — PHARMACY VISIT (OUTPATIENT)
Dept: PHARMACY | Facility: MEDICAL CENTER | Age: 62
End: 2022-12-05
Payer: COMMERCIAL

## 2022-12-05 PROCEDURE — RXMED WILLOW AMBULATORY MEDICATION CHARGE: Performed by: INTERNAL MEDICINE

## 2022-12-09 DIAGNOSIS — F41.9 ANXIETY: ICD-10-CM

## 2022-12-12 PROCEDURE — RXMED WILLOW AMBULATORY MEDICATION CHARGE: Performed by: INTERNAL MEDICINE

## 2022-12-12 RX ORDER — LORAZEPAM 0.5 MG/1
0.5 TABLET ORAL
Qty: 30 TABLET | Refills: 0 | Status: SHIPPED | OUTPATIENT
Start: 2022-12-12 | End: 2023-01-19

## 2022-12-20 ENCOUNTER — PHARMACY VISIT (OUTPATIENT)
Dept: PHARMACY | Facility: MEDICAL CENTER | Age: 62
End: 2022-12-20
Payer: COMMERCIAL

## 2023-02-10 PROCEDURE — RXMED WILLOW AMBULATORY MEDICATION CHARGE: Performed by: INTERNAL MEDICINE

## 2023-02-23 ENCOUNTER — PHARMACY VISIT (OUTPATIENT)
Dept: PHARMACY | Facility: MEDICAL CENTER | Age: 63
End: 2023-02-23
Payer: COMMERCIAL

## 2023-02-27 DIAGNOSIS — Z00.00 HEALTH CARE MAINTENANCE: ICD-10-CM

## 2023-02-27 DIAGNOSIS — I10 ESSENTIAL HYPERTENSION: ICD-10-CM

## 2023-02-27 DIAGNOSIS — I15.9 SECONDARY HYPERTENSION, UNSPECIFIED: ICD-10-CM

## 2023-02-27 DIAGNOSIS — E55.9 VITAMIN D DEFICIENCY: ICD-10-CM

## 2023-02-27 DIAGNOSIS — D75.89 MACROCYTOSIS: ICD-10-CM

## 2023-02-27 PROCEDURE — RXMED WILLOW AMBULATORY MEDICATION CHARGE: Performed by: FAMILY MEDICINE

## 2023-02-27 RX ORDER — LISINOPRIL 20 MG/1
20 TABLET ORAL
Qty: 90 TABLET | Refills: 0 | Status: SHIPPED | OUTPATIENT
Start: 2023-02-27 | End: 2023-05-21 | Stop reason: SDUPTHER

## 2023-02-27 RX ORDER — PROPRANOLOL HYDROCHLORIDE 20 MG/1
20 TABLET ORAL
Qty: 90 TABLET | Refills: 0 | Status: SHIPPED | OUTPATIENT
Start: 2023-02-27 | End: 2023-05-21 | Stop reason: SDUPTHER

## 2023-03-02 ENCOUNTER — PHARMACY VISIT (OUTPATIENT)
Dept: PHARMACY | Facility: MEDICAL CENTER | Age: 63
End: 2023-03-02
Payer: COMMERCIAL

## 2023-03-03 ENCOUNTER — HOSPITAL ENCOUNTER (OUTPATIENT)
Facility: MEDICAL CENTER | Age: 63
End: 2023-03-03
Attending: FAMILY MEDICINE
Payer: COMMERCIAL

## 2023-03-03 ENCOUNTER — NON-PROVIDER VISIT (OUTPATIENT)
Dept: INTERNAL MEDICINE | Facility: IMAGING CENTER | Age: 63
End: 2023-03-03
Payer: COMMERCIAL

## 2023-03-03 DIAGNOSIS — E55.9 VITAMIN D DEFICIENCY: ICD-10-CM

## 2023-03-03 DIAGNOSIS — Z00.00 HEALTH CARE MAINTENANCE: ICD-10-CM

## 2023-03-03 DIAGNOSIS — Z01.89 ENCOUNTER FOR ROUTINE LABORATORY TESTING: ICD-10-CM

## 2023-03-03 DIAGNOSIS — I10 ESSENTIAL HYPERTENSION: ICD-10-CM

## 2023-03-03 DIAGNOSIS — D75.89 MACROCYTOSIS: ICD-10-CM

## 2023-03-03 LAB
25(OH)D3 SERPL-MCNC: 26 NG/ML (ref 30–100)
ALBUMIN SERPL BCP-MCNC: 4.9 G/DL (ref 3.2–4.9)
ALBUMIN/GLOB SERPL: 2 G/DL
ALP SERPL-CCNC: 86 U/L (ref 30–99)
ALT SERPL-CCNC: 24 U/L (ref 2–50)
ANION GAP SERPL CALC-SCNC: 11 MMOL/L (ref 7–16)
AST SERPL-CCNC: 21 U/L (ref 12–45)
BASOPHILS # BLD AUTO: 0.6 % (ref 0–1.8)
BASOPHILS # BLD: 0.04 K/UL (ref 0–0.12)
BILIRUB SERPL-MCNC: 0.6 MG/DL (ref 0.1–1.5)
BUN SERPL-MCNC: 17 MG/DL (ref 8–22)
CALCIUM ALBUM COR SERPL-MCNC: 8.9 MG/DL (ref 8.5–10.5)
CALCIUM SERPL-MCNC: 9.6 MG/DL (ref 8.5–10.5)
CHLORIDE SERPL-SCNC: 104 MMOL/L (ref 96–112)
CHOLEST SERPL-MCNC: 208 MG/DL (ref 100–199)
CO2 SERPL-SCNC: 23 MMOL/L (ref 20–33)
CREAT SERPL-MCNC: 0.7 MG/DL (ref 0.5–1.4)
EOSINOPHIL # BLD AUTO: 0.06 K/UL (ref 0–0.51)
EOSINOPHIL NFR BLD: 1 % (ref 0–6.9)
ERYTHROCYTE [DISTWIDTH] IN BLOOD BY AUTOMATED COUNT: 41.8 FL (ref 35.9–50)
FOLATE SERPL-MCNC: 10.7 NG/ML
GFR SERPLBLD CREATININE-BSD FMLA CKD-EPI: 96 ML/MIN/1.73 M 2
GLOBULIN SER CALC-MCNC: 2.4 G/DL (ref 1.9–3.5)
GLUCOSE SERPL-MCNC: 100 MG/DL (ref 65–99)
HCT VFR BLD AUTO: 44.2 % (ref 37–47)
HDLC SERPL-MCNC: 46 MG/DL
HGB BLD-MCNC: 15.6 G/DL (ref 12–16)
IMM GRANULOCYTES # BLD AUTO: 0.01 K/UL (ref 0–0.11)
IMM GRANULOCYTES NFR BLD AUTO: 0.2 % (ref 0–0.9)
IRON SATN MFR SERPL: 57 % (ref 15–55)
IRON SERPL-MCNC: 155 UG/DL (ref 40–170)
LDLC SERPL CALC-MCNC: 143 MG/DL
LYMPHOCYTES # BLD AUTO: 2.39 K/UL (ref 1–4.8)
LYMPHOCYTES NFR BLD: 38 % (ref 22–41)
MCH RBC QN AUTO: 33.1 PG (ref 27–33)
MCHC RBC AUTO-ENTMCNC: 35.3 G/DL (ref 33.6–35)
MCV RBC AUTO: 93.6 FL (ref 81.4–97.8)
MONOCYTES # BLD AUTO: 0.52 K/UL (ref 0–0.85)
MONOCYTES NFR BLD AUTO: 8.3 % (ref 0–13.4)
NEUTROPHILS # BLD AUTO: 3.27 K/UL (ref 2–7.15)
NEUTROPHILS NFR BLD: 51.9 % (ref 44–72)
NRBC # BLD AUTO: 0 K/UL
NRBC BLD-RTO: 0 /100 WBC
PLATELET # BLD AUTO: 303 K/UL (ref 164–446)
PMV BLD AUTO: 10 FL (ref 9–12.9)
POTASSIUM SERPL-SCNC: 4.2 MMOL/L (ref 3.6–5.5)
PROT SERPL-MCNC: 7.3 G/DL (ref 6–8.2)
RBC # BLD AUTO: 4.72 M/UL (ref 4.2–5.4)
SODIUM SERPL-SCNC: 138 MMOL/L (ref 135–145)
T4 FREE SERPL-MCNC: 1.28 NG/DL (ref 0.93–1.7)
TIBC SERPL-MCNC: 274 UG/DL (ref 250–450)
TRIGL SERPL-MCNC: 93 MG/DL (ref 0–149)
TSH SERPL DL<=0.005 MIU/L-ACNC: 2.49 UIU/ML (ref 0.38–5.33)
UIBC SERPL-MCNC: 119 UG/DL (ref 110–370)
VIT B12 SERPL-MCNC: 594 PG/ML (ref 211–911)
WBC # BLD AUTO: 6.3 K/UL (ref 4.8–10.8)

## 2023-03-03 PROCEDURE — 83550 IRON BINDING TEST: CPT

## 2023-03-03 PROCEDURE — 84439 ASSAY OF FREE THYROXINE: CPT

## 2023-03-03 PROCEDURE — 84443 ASSAY THYROID STIM HORMONE: CPT

## 2023-03-03 PROCEDURE — 82306 VITAMIN D 25 HYDROXY: CPT

## 2023-03-03 PROCEDURE — 82607 VITAMIN B-12: CPT

## 2023-03-03 PROCEDURE — 85025 COMPLETE CBC W/AUTO DIFF WBC: CPT

## 2023-03-03 PROCEDURE — 80053 COMPREHEN METABOLIC PANEL: CPT

## 2023-03-03 PROCEDURE — 83540 ASSAY OF IRON: CPT

## 2023-03-03 PROCEDURE — 82746 ASSAY OF FOLIC ACID SERUM: CPT

## 2023-03-03 PROCEDURE — 80061 LIPID PANEL: CPT

## 2023-03-10 ENCOUNTER — OFFICE VISIT (OUTPATIENT)
Dept: INTERNAL MEDICINE | Facility: IMAGING CENTER | Age: 63
End: 2023-03-10
Payer: COMMERCIAL

## 2023-03-10 VITALS
WEIGHT: 179.2 LBS | TEMPERATURE: 97.9 F | BODY MASS INDEX: 25.09 KG/M2 | DIASTOLIC BLOOD PRESSURE: 62 MMHG | SYSTOLIC BLOOD PRESSURE: 118 MMHG | OXYGEN SATURATION: 97 % | HEART RATE: 79 BPM | RESPIRATION RATE: 17 BRPM | HEIGHT: 71 IN

## 2023-03-10 DIAGNOSIS — M79.604 BILATERAL LOWER EXTREMITY PAIN: ICD-10-CM

## 2023-03-10 DIAGNOSIS — F41.1 GENERALIZED ANXIETY DISORDER: ICD-10-CM

## 2023-03-10 DIAGNOSIS — Z23 NEED FOR VACCINATION: ICD-10-CM

## 2023-03-10 DIAGNOSIS — M54.50 CHRONIC BILATERAL LOW BACK PAIN WITHOUT SCIATICA: ICD-10-CM

## 2023-03-10 DIAGNOSIS — Z17.0 MALIGNANT NEOPLASM OF UPPER-INNER QUADRANT OF RIGHT BREAST IN FEMALE, ESTROGEN RECEPTOR POSITIVE (HCC): ICD-10-CM

## 2023-03-10 DIAGNOSIS — G25.0 ESSENTIAL TREMOR: ICD-10-CM

## 2023-03-10 DIAGNOSIS — G89.29 CHRONIC BILATERAL LOW BACK PAIN WITHOUT SCIATICA: ICD-10-CM

## 2023-03-10 DIAGNOSIS — M79.605 BILATERAL LOWER EXTREMITY PAIN: ICD-10-CM

## 2023-03-10 DIAGNOSIS — C50.211 MALIGNANT NEOPLASM OF UPPER-INNER QUADRANT OF RIGHT BREAST IN FEMALE, ESTROGEN RECEPTOR POSITIVE (HCC): ICD-10-CM

## 2023-03-10 DIAGNOSIS — E55.9 VITAMIN D DEFICIENCY: ICD-10-CM

## 2023-03-10 DIAGNOSIS — Z80.0 FAMILY HISTORY OF MALIGNANT NEOPLASM OF COLON: ICD-10-CM

## 2023-03-10 DIAGNOSIS — K21.9 GASTROESOPHAGEAL REFLUX DISEASE WITHOUT ESOPHAGITIS: ICD-10-CM

## 2023-03-10 DIAGNOSIS — Z00.00 HEALTH CARE MAINTENANCE: ICD-10-CM

## 2023-03-10 DIAGNOSIS — Z71.85 VACCINE COUNSELING: ICD-10-CM

## 2023-03-10 DIAGNOSIS — I10 PRIMARY HYPERTENSION: ICD-10-CM

## 2023-03-10 DIAGNOSIS — Z00.00 WELLNESS EXAMINATION: ICD-10-CM

## 2023-03-10 PROBLEM — G47.62 LEG CRAMPS, SLEEP RELATED: Status: RESOLVED | Noted: 2018-12-12 | Resolved: 2023-03-10

## 2023-03-10 PROBLEM — F32.A ANXIETY AND DEPRESSION: Status: RESOLVED | Noted: 2018-12-12 | Resolved: 2023-03-10

## 2023-03-10 PROBLEM — F41.9 ANXIETY AND DEPRESSION: Status: RESOLVED | Noted: 2018-12-12 | Resolved: 2023-03-10

## 2023-03-10 PROCEDURE — 90715 TDAP VACCINE 7 YRS/> IM: CPT | Performed by: FAMILY MEDICINE

## 2023-03-10 PROCEDURE — 99396 PREV VISIT EST AGE 40-64: CPT | Mod: 25 | Performed by: FAMILY MEDICINE

## 2023-03-10 PROCEDURE — 90471 IMMUNIZATION ADMIN: CPT | Performed by: FAMILY MEDICINE

## 2023-03-10 ASSESSMENT — FIBROSIS 4 INDEX: FIB4 SCORE: 0.91

## 2023-03-10 ASSESSMENT — PATIENT HEALTH QUESTIONNAIRE - PHQ9: CLINICAL INTERPRETATION OF PHQ2 SCORE: 0

## 2023-03-11 PROBLEM — C50.211 MALIGNANT NEOPLASM OF UPPER-INNER QUADRANT OF RIGHT BREAST IN FEMALE, ESTROGEN RECEPTOR POSITIVE (HCC): Status: RESOLVED | Noted: 2022-03-29 | Resolved: 2023-03-11

## 2023-03-11 PROBLEM — Z17.0 MALIGNANT NEOPLASM OF UPPER-INNER QUADRANT OF RIGHT BREAST IN FEMALE, ESTROGEN RECEPTOR POSITIVE (HCC): Status: RESOLVED | Noted: 2022-03-29 | Resolved: 2023-03-11

## 2023-03-11 NOTE — PROGRESS NOTES
Chief Complaint   Patient presents with    Establish Care     Transfer care from Dr. Presley    Annual Exam       HPI:  Patient is a 63 y.o. female established patient who presents today for her annual exam and to transfer care from Dr. Presley to myself. She has history of chronic essential hypertension with daily Lisinopril use, and chronic essential tremor with ongoing Inderal use. She has GERD without daily PPI use, and chronic HÉCTOR with PRN Ativan use. She continues to benefit from Ativan use, denies medication related side effects, and uses this controlled medication in a safe manner. She is aware of vaccine eligibility and is willing to obtain Tdap today. She will be due for repeat screening colonoscopy in August at [facility] but would like to defer screening for now. She has history of vitamin D deficiency and recently restarted Vitamin D supplementation. She is due for dexa scan but would like to defer imaging at this time. She has chronic right ear tinnitus and recurrent back problems when bending over since 2016. She has seen Dr Anderson in the past for management and has taken two falls while walking her large dog within the recent past. She is interested in PT sessions for both low back and lower leg strengthening. She has history of abnormal screening  mammogram 12/17/21 (calcifications upper inner quadrant of right breast) and subsequently was diagnosed with right breast DCIS on biopsy. She underwent  wire localized partial mastectomy and mastopexy 3/8/22 by Dr. Aguilar. Pathology c/w ductal carcinoma in situ, intermediate nuclear grade with clear margins, 3 mm in greatest dimension. She declined further treatment after meeting with Dr. Alfaro and Dr. Up. She prefers to not label this condition as cancer and would like to defer further breast imaging for now (very triggering for her). She sees a counselor regularly for mental health support and is open to seeing dermatology for annual skin check. She  "remains busy working as intake RN for [employer] and is in good spirits today overall.     Patient Active Problem List    Diagnosis Date Noted    Breast cancer (DCIS), right upper medial (Jan. 2022) 01/28/2022    Vitamin D deficiency 06/17/2019    Generalized anxiety disorder 12/12/2018    Gastroesophageal reflux disease without esophagitis 12/12/2018    Family history of malignant neoplasm of colon 12/30/2016    Essential tremor 12/30/2016    Hypertension 07/15/2015       Past medical, surgical, family, and social history was reviewed and updated in Epic chart by me today.     Medications and allergies reviewed and updated in Epic chart by me today.     Labs done 3/3/23 reviewed with patient at visit today.     ROS:  Pertinent positives listed above in HPI. All other systems have been reviewed and are negative.    PE:   /62 (BP Location: Left arm, Patient Position: Sitting, BP Cuff Size: Adult)   Pulse 79   Temp 36.6 °C (97.9 °F) (Temporal)   Resp 17   Ht 1.803 m (5' 11\")   Wt 81.3 kg (179 lb 3.2 oz)   LMP 12/12/2005   SpO2 97%   BMI 24.99 kg/m²   Vital signs reviewed with patient.     Gen: Well developed; well nourished; no acute distress; age appropriate appearance   HEENT: Normocephalic; atraumatic; PEERLA b/l; sclera clear b/l; b/l external auditory canals WNL; b/l TM WNL; nares patent; oropharynx clear; mask in place  Neck: No adenopathy; no thyromegaly  CV: Regular rate and rhythm; S1/ S2 present; no murmur, gallop or rub noted  Pulm: No respiratory distress; clear to ascultation b/l; no wheezing or stridor noted b/l  Abd: Adequate bowel sounds noted; soft and nontender; no rebound, rigidity, nor distention;  Extremities: No peripheral edema b/l LE extremities/ no clubbing nor cyanosis noted  Skin: Warm and dry; no rashes noted   Neuro: No focal deficits noted   Psych: AAOx4; mood and affect are appropriate    A/P:  1. Need for vaccination  Vaccine administered at visit today.  - Tdap " =>8yo IM    2. Health care maintenance  Recommend patient have annual skin check, and new referral made to Dr. Ha at [facility].   - Referral to Dermatology    3. Chronic bilateral low back pain without sciatica  Chronic condition for patient - refer to [facility] for details. Referral made to [facility] PT for evaluation and treatment.   - Referral to Physical Therapy    4. Bilateral lower extremity pain  Chronic condition for patient - refer to [facility] for details. Referral made to [facility] PT for evaluation and treatment.   - Referral to Physical Therapy    5. Primary hypertension  Stable/ recommend patient continue daily Lisinopril use.     6. Generalized anxiety disorder  Stable/ patient can continue PRN Ativan use for symptom management, and she sees a mental health counselor regularly.     7. Gastroesophageal reflux disease without esophagitis  Stable without daily PPI use.     8. Vitamin D deficiency  Patient recently restarted daily Vitamin D supplementation and will follow.     9. Malignant neoplasm of upper-inner quadrant of right breast in female, estrogen receptor positive (HCC)  Refer to [facility] for specific details - patient declines further breast imaging at this time.     10. Essential tremor  Stable with Inderal use    11. Family history of malignant neoplasm of colon  Patient declines repeat colon cancer screening at [facility] this year. Will discuss at subsequent appointments as indicated.     12. Vaccine counseling  Patient is aware of vaccine eligibility     13. Wellness examination  Patient remains well informed about her overall health at this time.      Anticipatory guidance provided today:  Update medical recommendations as discussed above  Continue healthy diet choices  Engage in regular physical activity daily and social activities weekly as tolerated   Follow up with me annually and sooner as needed

## 2023-05-03 ENCOUNTER — PHYSICAL THERAPY (OUTPATIENT)
Dept: PHYSICAL THERAPY | Facility: MEDICAL CENTER | Age: 63
End: 2023-05-03
Attending: FAMILY MEDICINE
Payer: COMMERCIAL

## 2023-05-03 DIAGNOSIS — M79.605 BILATERAL LOWER EXTREMITY PAIN: ICD-10-CM

## 2023-05-03 DIAGNOSIS — M79.604 BILATERAL LOWER EXTREMITY PAIN: ICD-10-CM

## 2023-05-03 DIAGNOSIS — G89.29 CHRONIC BILATERAL LOW BACK PAIN WITHOUT SCIATICA: ICD-10-CM

## 2023-05-03 DIAGNOSIS — M54.50 CHRONIC BILATERAL LOW BACK PAIN WITHOUT SCIATICA: ICD-10-CM

## 2023-05-03 PROCEDURE — 97110 THERAPEUTIC EXERCISES: CPT

## 2023-05-03 PROCEDURE — 97161 PT EVAL LOW COMPLEX 20 MIN: CPT

## 2023-05-03 ASSESSMENT — ENCOUNTER SYMPTOMS
PAIN TIMING: EVERY MORNING
QUALITY: ACHING
PAIN SCALE AT HIGHEST: 3
PAIN SCALE: 0
PAIN SCALE AT LOWEST: 0

## 2023-05-03 NOTE — OP THERAPY EVALUATION
"  Outpatient Physical Therapy  INITIAL EVALUATION    [facility]  [address]  [place] [address]  Phone:  [phone]  Fax:  [phone]    Date of Evaluation: 2023    Patient: Mariza Steward  YOB: 1960  MRN: [id number]     Referring Provider: Janet Holguin M.D.  [address] [facility]  [address]  [place] [address]   Referring Diagnosis Chronic bilateral low back pain without sciatica [M54.50, G89.29];Bilateral lower extremity pain [M79.604, M79.605]     Time Calculation  Start time: 1105  Stop time: 1144 Time Calculation (min): 39 minutes         Chief Complaint: Back Problem    Visit Diagnoses     ICD-10-CM   1. Chronic bilateral low back pain without sciatica  M54.50    G89.29   2. Bilateral lower extremity pain  M79.604    M79.605       Date of onset of impairment: 5/3/2023    Subjective:   History of Present Illness:     Mechanism of injury:    This is a familiar pt to the clinic, who was seen in  for 8 sessions treating her chronic and episodic LBP with positive response. States the lower back pain is overall still fairly well controlled, though she wanted to get in for a 'tune up' as she started to notice some aches and \"near misses\" with regard to a major flare up. Happy to have not had any major flare ups in the last 2 years. She reports having a hard fall while she was walking with her dog in 2022. After this, she was overall less active an no longer performing her HEP. Just recently she began doing the HEP she could remember, returned to walking her dog and hoping to get back on track.   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 Walks a couple times a week.  Sleep disturbance:  Not disrupted  Pain:     Current pain ratin    At best pain ratin    At worst pain rating:  3    Quality:  Aching (stiff)    Pain timing:  Every morning (for the first 20 " mins when she gets out of bed)    Alleviating factors: stretching, going for walks.    Exacerbated by: First thing in the morning, reaching up and turning, bending over.    Progression:  Stable  Treatments:     Previous treatment:  Physical therapy  Patient Goals:     Patient goals for therapy:  Decreased pain, increased strength, independence with ADLs/IADLs and increased motion    Past Medical History:   Diagnosis Date    Anesthesia 02/24/2022    PONV, chills/shaking, motion sickness    Anxiety and depression 12/12/2018    Breast cancer (DCIS), right upper medial  01/28/2022    Cervicalgia     Depression with anxiety 02/24/2022    Gastroesophageal reflux disease without esophagitis 12/12/2018    Generalized anxiety disorder 12/12/2018    GERD (gastroesophageal reflux disease)     Heart burn 02/24/2022    medicated prn    High cholesterol 02/24/2022    non medicated    Hypertension 02/24/2022    medicated    Indigestion 02/24/2022    medicated prn    Pain 02/24/2022    low back pain    PONV (postoperative nausea and vomiting) 02/24/2022     Past Surgical History:   Procedure Laterality Date    PB MASTECTOMY, PARTIAL Right 3/8/2022    Procedure: MASTECTOMY, PARTIAL - WIRE LOCALIZED;  Surgeon: Josefina Aguilar M.D.;  Location: SURGERY SAME DAY [facility];  Service: General    VA SUSPENSION OF BREAST Right 3/8/2022    Procedure: MASTOPEXY;  Surgeon: Josefina Aguilar M.D.;  Location: SURGERY SAME DAY [facility];  Service: General    HYSTERECTOMY, VAGINAL  2005    OTHER  1963    tonsillectomy     Social History     Tobacco Use    Smoking status: Never     Passive exposure: Never    Smokeless tobacco: Never   Substance Use Topics    Alcohol use: Yes     Alcohol/week: 0.5 oz     Types: 1 drink(s) per week     Comment: rare     Family and Occupational History     Socioeconomic History    Marital status: Single     Spouse name: Not on file    Number of children: Not on file    Years of education: Not on file    Highest education  level: Not on file   Occupational History    Not on file       Objective     Observations     Additional Observation Details  Mild L SB and L hip shift in standing.     Postural Observations  Seated posture: good      Hip Screen   Hip range of motion within functional limits.  Hip strength within functional limits    Neurological Testing     Reflexes   Left   Patellar (L4): normal (2+)    Right   Patellar (L4): normal (2+)    Myotome testing   Lumbar (left)   All left lumbar myotomes within normal limits    Lumbar (right)   All right lumbar myotomes within normal limits    Dermatome testing   Lumbar (left)   All left lumbar dermatomes intact    Lumbar (right)   All right lumbar dermatomes intact    Active Range of Motion     Lumbar   Flexion: decreased (poor segmental disociation. FT to ankles. Some pain in the L lower lumbar)  Extension: within functional limits  Left lateral flexion: within functional limits  Right lateral flexion: decreased (L lower lumbar pain)  Left rotation: within functional limits  Right rotation: within functional limits    Strength:      Abdominals   Lower abdominals: Able to maintain neutral statically    Back   Flexion: 4-  Extension: 4    Lower extremities   Normal left lower extremity strength  Normal right lower extremity strength      Therapeutic Exercises (CPT 64082):     1. Education: reviewed concepts of movement bias. When to utilize extension based movement vs return to flexion and loading. Reviewed 3D pelvic tilts in supine, focus on posterior PT and lower abdominal recruitment. Progressed to segmental bridging.      Therapeutic Exercise Summary:   Current HEP: PPU, prone superman, LTR, pelvic tilts, bridging, HS stretches, Fig4.-> updated to segmental bridge.     Time-based treatments/modalities:    Physical Therapy Timed Treatment Charges  Therapeutic exercise minutes (CPT 27306): 10 minutes      Assessment, Response and Plan:   Impairments: abnormal or restricted ROM,  activity intolerance, difficulty performing job, impaired functional mobility, impaired physical strength, lacks appropriate home exercise program and limited ADL's    Assessment details:    Ms. Steward is a 63 y.o female who presents to PT with complaint of a mild relapse in her chronic lower back pain related to reduced physical activity over the last 9 months. PT evaluation reveals L lateral shift (chronic) in the lumbar with reduced tolerance to opening motions (R SB and flexion). She demonstrates decreased lower abdominal and posterior chain strength, some continuation of fear avoidance and reduced tolerance for functional lifting. Skilled PT services are indicated to address the mentioned functional limitations and enhance QOL.     Prognosis: good    Goals:   Short Term Goals:     - Pt is able to standing fwd flex to feet segmentally without pain  - Pt is able to perform segmental bridges without HS cramping.   Short term goal time span:  1-2 weeks      Long Term Goals:      - Pt is able to lift 20# x 10 reps from the floor without LBP  - Pt reports at least 50% resolution of morning stiffness  - Pt demonstrates independence with a HEP for continued management of this problem beyond discharge from therapy.     Long term goal time span:  6-8 weeks    Plan:   Therapy options:  Physical therapy treatment to continue  Planned therapy interventions:  E Stim Unattended (CPT 10356), Therapeutic Activities (CPT 05053), Therapeutic Exercise (CPT 68623), Manual Therapy (CPT 17058) and Neuromuscular Re-education (CPT 27249)  Frequency: 1-2x/week.  Duration in weeks:  8  Discussed with:  Patient    Functional Assessment Used    RMQ= 0/24    Referring provider co-signature:  I have reviewed this plan of care and my co-signature certifies the need for services.    Certification Period: 05/03/2023 to  06/28/23    Physician Signature: ________________________________ Date: ______________

## 2023-05-04 NOTE — OP THERAPY DAILY TREATMENT
"  Outpatient Physical Therapy  DAILY TREATMENT     [facility] Outpatient Physical Therapy  [address]  [place] [address]  Phone:  [phone]  Fax:  [phone]    Date: 05/05/2023    Patient: Mariza Steward  YOB: 1960  MRN: [id number]     Time Calculation    Start time: 0800  Stop time: 0845 Time Calculation (min): 45 minutes         Chief Complaint: Back Problem    Visit #: 2    SUBJECTIVE:  I'm already starting to feel more confident. Notices feeling weak/unstable when she hinges to iron or lean over into the refrigerator.     OBJECTIVE:      Therapeutic Exercises (CPT 80519):     1. Segmental ball bridge, 5\" x 20    2. KTOS, x 1 min ea    3. LTR with fig 4, x 1 min    4. supine 90/90 hold, 3x15 seconds, challenging    5. Side plank, 2x10\"    6. pullbacks, 12# x 20    7. palloff press, 17# x 15 ea    8. back to wall segmental flexion, x 10      Therapeutic Exercise Summary:   Access Code: [email or web address]  URL: [email or web address]  Date: 05/05/2023  Prepared by: Courtney Harris    Exercises  - Supine Piriformis Stretch with Foot on Ground  - 1 x daily - 60 sec hold  - Supine 90/90 Abdominal Bracing  - 1 x daily - 3 sets - 15 sec hold  - Side Plank on Knees  - 1 x daily - 2-3 sets - 10 sec hold  - Supine Bridge  - 1 x daily - 20 reps - 5 sec hold    Time-based treatments/modalities:    Physical Therapy Timed Treatment Charges  Therapeutic exercise minutes (CPT 45887): 45 minutes    ASSESSMENT:   Response to treatment: Excellent participation. Limited endurance for moderate level core stab. Will need further progressions to maximize functional loading tolerance.      PLAN/RECOMMENDATIONS:   Plan for treatment: therapy treatment to continue next visit.  Planned interventions for next visit: continue with current treatment.         "

## 2023-05-05 ENCOUNTER — PHYSICAL THERAPY (OUTPATIENT)
Dept: PHYSICAL THERAPY | Facility: MEDICAL CENTER | Age: 63
End: 2023-05-05
Attending: FAMILY MEDICINE
Payer: COMMERCIAL

## 2023-05-05 DIAGNOSIS — M54.50 CHRONIC BILATERAL LOW BACK PAIN WITHOUT SCIATICA: ICD-10-CM

## 2023-05-05 DIAGNOSIS — G89.29 CHRONIC BILATERAL LOW BACK PAIN WITHOUT SCIATICA: ICD-10-CM

## 2023-05-05 DIAGNOSIS — M79.605 BILATERAL LOWER EXTREMITY PAIN: ICD-10-CM

## 2023-05-05 DIAGNOSIS — M79.604 BILATERAL LOWER EXTREMITY PAIN: ICD-10-CM

## 2023-05-05 PROCEDURE — 97110 THERAPEUTIC EXERCISES: CPT

## 2023-05-09 ENCOUNTER — APPOINTMENT (OUTPATIENT)
Dept: PHYSICAL THERAPY | Facility: MEDICAL CENTER | Age: 63
End: 2023-05-09
Attending: FAMILY MEDICINE
Payer: COMMERCIAL

## 2023-05-11 ENCOUNTER — APPOINTMENT (OUTPATIENT)
Dept: PHYSICAL THERAPY | Facility: MEDICAL CENTER | Age: 63
End: 2023-05-11
Attending: FAMILY MEDICINE
Payer: COMMERCIAL

## 2023-05-16 ENCOUNTER — PHYSICAL THERAPY (OUTPATIENT)
Dept: PHYSICAL THERAPY | Facility: MEDICAL CENTER | Age: 63
End: 2023-05-16
Attending: FAMILY MEDICINE
Payer: COMMERCIAL

## 2023-05-16 DIAGNOSIS — M79.605 BILATERAL LOWER EXTREMITY PAIN: ICD-10-CM

## 2023-05-16 DIAGNOSIS — M54.50 LUMBOSACRAL PAIN: ICD-10-CM

## 2023-05-16 DIAGNOSIS — M79.604 BILATERAL LOWER EXTREMITY PAIN: ICD-10-CM

## 2023-05-16 DIAGNOSIS — G89.29 CHRONIC BILATERAL LOW BACK PAIN WITHOUT SCIATICA: ICD-10-CM

## 2023-05-16 DIAGNOSIS — M54.50 CHRONIC BILATERAL LOW BACK PAIN WITHOUT SCIATICA: ICD-10-CM

## 2023-05-16 DIAGNOSIS — M53.3 SACROILIAC JOINT DYSFUNCTION: ICD-10-CM

## 2023-05-16 PROCEDURE — 97110 THERAPEUTIC EXERCISES: CPT

## 2023-05-16 NOTE — OP THERAPY DAILY TREATMENT
"  Outpatient Physical Therapy  DAILY TREATMENT     [facility] Outpatient Physical Therapy  [address]  [place] [address]  Phone:  [phone]  Fax:  [phone]    Date: 05/16/2023    Patient: Mariza Steward  YOB: 1960  MRN: [id number]     Time Calculation    Start time: 1102  Stop time: 1140 Time Calculation (min): 38 minutes         Chief Complaint: Back Problem    Visit #: 3    SUBJECTIVE:  My arms were very sore after last visit. Has been working on her HEP, but not as much as she'd like to do.     OBJECTIVE:      Therapeutic Exercises (CPT 88015):     1. Segmental ball bridge, HEP this morning    2. KTOS, HEP this morning    3. supine 90/90 hold, x 30\"    4. supine 90/90 taps, 2x5 ea    5. marching bridge, 2x5 ea, R HS cramp initially, but was able to recover and perform without issue from there    6. supine ball roll ups, 3 way x 10 ea    7. inclined plank shoulder taps, 24\" surface x 15 ea    8. counter push up, 2x10    9. March with KB in unilateral front rack, 15# x 10 ea    10. ute curl, 10# x 10      Therapeutic Exercise Summary: Access Code: [email or web address]  URL: [email or web address]  Date: 05/16/2023  Prepared by: Courtney Harris    Exercises  - Supine Piriformis Stretch with Foot on Ground  - 1 x daily - 60 sec hold  - Side Plank on Knees  - 1 x daily - 2-3 sets - 10 sec hold  - Supine Bridge  - 1 x daily - 20 reps - 5 sec hold  - Supine 90/90 Alternating Toe Touch  - 1 x daily - 2 sets - 10 reps      Time-based treatments/modalities:    Physical Therapy Timed Treatment Charges  Therapeutic exercise minutes (CPT 82987): 38 minutes    ASSESSMENT:   Response to treatment: Improved lower abdominal endurance in the 90/90 position. Able to advance to dynamic control. Improving tolerance for lumbar flexion patterning. Appropriate to continue.    PLAN/RECOMMENDATIONS:   Plan for treatment: therapy treatment to continue next " visit.  Planned interventions for next visit: continue with current treatment.

## 2023-05-21 DIAGNOSIS — I10 ESSENTIAL HYPERTENSION: ICD-10-CM

## 2023-05-21 DIAGNOSIS — G25.0 ESSENTIAL TREMOR: ICD-10-CM

## 2023-05-22 PROCEDURE — RXMED WILLOW AMBULATORY MEDICATION CHARGE: Performed by: FAMILY MEDICINE

## 2023-05-22 RX ORDER — LISINOPRIL 20 MG/1
20 TABLET ORAL
Qty: 90 TABLET | Refills: 3 | Status: SHIPPED | OUTPATIENT
Start: 2023-05-22

## 2023-05-22 RX ORDER — PROPRANOLOL HYDROCHLORIDE 20 MG/1
20 TABLET ORAL
Qty: 90 TABLET | Refills: 3 | Status: SHIPPED | OUTPATIENT
Start: 2023-05-22

## 2023-05-23 ENCOUNTER — PHYSICAL THERAPY (OUTPATIENT)
Dept: PHYSICAL THERAPY | Facility: MEDICAL CENTER | Age: 63
End: 2023-05-23
Attending: FAMILY MEDICINE
Payer: COMMERCIAL

## 2023-05-23 DIAGNOSIS — M79.604 BILATERAL LOWER EXTREMITY PAIN: ICD-10-CM

## 2023-05-23 DIAGNOSIS — M54.50 CHRONIC BILATERAL LOW BACK PAIN WITHOUT SCIATICA: ICD-10-CM

## 2023-05-23 DIAGNOSIS — M54.50 LUMBOSACRAL PAIN: ICD-10-CM

## 2023-05-23 DIAGNOSIS — M79.605 BILATERAL LOWER EXTREMITY PAIN: ICD-10-CM

## 2023-05-23 DIAGNOSIS — G89.29 CHRONIC BILATERAL LOW BACK PAIN WITHOUT SCIATICA: ICD-10-CM

## 2023-05-23 DIAGNOSIS — M53.3 SACROILIAC JOINT DYSFUNCTION: ICD-10-CM

## 2023-05-23 PROCEDURE — 97110 THERAPEUTIC EXERCISES: CPT

## 2023-05-23 NOTE — OP THERAPY DAILY TREATMENT
"  Outpatient Physical Therapy  DAILY TREATMENT     [facility] Outpatient Physical Therapy  [address]  [place] [address]  Phone:  [phone]  Fax:  [phone]    Date: 05/23/2023    Patient: Mariza Steward  YOB: 1960  MRN: [id number]     Time Calculation    Start time: 1100  Stop time: 1155 Time Calculation (min): 55 minutes         Chief Complaint: Back Problem    Visit #: 4    SUBJECTIVE:  I'm feeling more natural in the way that I move.     OBJECTIVE:      Therapeutic Exercises (CPT 14483):     1. TM, 2.0 mph x 5 min    2. KTOS, x 1 min ea    3. Spinal twist with fig 4, x 30\" ea    4. supine active HS stretch, x 10 ea    5. 90/90 taps, 2x10 ea, improved control, double the endurance compared to last visit    6. supine ball roll ups, 3 way x 10 ea    7. marching bridge, 2x10 ea    8. side plank, x 10 bridges on each side    9. squat, to a 16\" target. x 10 BW, x 10 wtih 10# in goblet posiiton    10. KB trunk SB, 15# x 15 ea    11. KB deadlift, 20# to mid shin x 15 reps, good carryover after cuing though the first few reps    12. step up, 8\" with 10# KB unilateral carry. x 10 ea, easy      Therapeutic Exercise Summary: Access Code: [email or web address]  URL: [email or web address]  Date: 05/16/2023  Prepared by: Courtney Harris    Exercises  - Supine Piriformis Stretch with Foot on Ground  - 1 x daily - 60 sec hold  - Side Plank on Knees  - 1 x daily - 2-3 sets - 10 sec hold  - Supine Bridge  - 1 x daily - 20 reps - 5 sec hold  - Supine 90/90 Alternating Toe Touch  - 1 x daily - 2 sets - 10 reps      Time-based treatments/modalities:    Physical Therapy Timed Treatment Charges  Therapeutic exercise minutes (CPT 29089): 55 minutes    ASSESSMENT:   Response to treatment: Substantially improved endurance and control isometric holding patterns. Showing good ability to translate into functional movement patterns. Would benefit from further development of " strength under external load.     PLAN/RECOMMENDATIONS:   Plan for treatment: therapy treatment to continue next visit.  Planned interventions for next visit: continue with current treatment.

## 2023-05-30 ENCOUNTER — APPOINTMENT (OUTPATIENT)
Dept: PHYSICAL THERAPY | Facility: MEDICAL CENTER | Age: 63
End: 2023-05-30
Attending: FAMILY MEDICINE
Payer: COMMERCIAL

## 2023-06-06 ENCOUNTER — PHYSICAL THERAPY (OUTPATIENT)
Dept: PHYSICAL THERAPY | Facility: MEDICAL CENTER | Age: 63
End: 2023-06-06
Attending: FAMILY MEDICINE
Payer: COMMERCIAL

## 2023-06-06 ENCOUNTER — PHARMACY VISIT (OUTPATIENT)
Dept: PHARMACY | Facility: MEDICAL CENTER | Age: 63
End: 2023-06-06
Payer: COMMERCIAL

## 2023-06-06 DIAGNOSIS — M54.50 LUMBOSACRAL PAIN: ICD-10-CM

## 2023-06-06 DIAGNOSIS — M79.604 BILATERAL LOWER EXTREMITY PAIN: ICD-10-CM

## 2023-06-06 DIAGNOSIS — M54.50 CHRONIC BILATERAL LOW BACK PAIN WITHOUT SCIATICA: ICD-10-CM

## 2023-06-06 DIAGNOSIS — G89.29 CHRONIC BILATERAL LOW BACK PAIN WITHOUT SCIATICA: ICD-10-CM

## 2023-06-06 DIAGNOSIS — M79.605 BILATERAL LOWER EXTREMITY PAIN: ICD-10-CM

## 2023-06-06 DIAGNOSIS — M53.3 SACROILIAC JOINT DYSFUNCTION: ICD-10-CM

## 2023-06-06 PROCEDURE — 97110 THERAPEUTIC EXERCISES: CPT

## 2023-06-06 NOTE — OP THERAPY DAILY TREATMENT
"  Outpatient Physical Therapy  DAILY TREATMENT     [facility] Outpatient Physical Therapy  [address]  [place] [address]  Phone:  [phone]  Fax:  [phone]    Date: 06/06/2023    Patient: Mariza Steward  YOB: 1960  MRN: [id number]     Time Calculation    Start time: 1059  Stop time: 1145 Time Calculation (min): 46 minutes         Chief Complaint: Back Problem    Visit #: 5    SUBJECTIVE:  I'm doing well. I have better awareness of movement and less fear of movement. There was a day after doing the HEP last week that my back felt near normal. Did have a couple nights of waking up with L hip to knee pain- mainly after doing yard work. No N/T. Has resolved and not present currently.     OBJECTIVE:  Oziel Tom Low Back Pain and Disability Score: 4.17     Therapeutic Exercises (CPT 59857):     1. TM, 2.5 mph x 5 min    2. KTOS, x 1 min ea    3. Spinal twist with fig 4, x 30\" ea    4. supine active HS stretch, x 10 ea    5. dead bug, 2x10 ea    6. marching bridge, 2x10 ea    7. STS tap with KB in goblet position, 15# 2x10    8. TKE, 5\" x 20 ea    9. lunge hold, x 30\" ea at 1/2 depth      Therapeutic Exercise Summary:   Access Code: [email or web address]  URL: [email or web address]  Date: 06/06/2023  Prepared by: Courtney Harris    Exercises  - Side Plank on Knees  - 4 x weekly - 2-3 sets - 10 sec hold  - Supine Dead Bug with Leg Extension  - 4 x weekly - 2-3 sets - 10 reps  - Marching Bridge  - 4 x weekly - 2-3 sets - 10-20 reps  - Single Leg Bridge  - 4 x weekly  - Mini Lunge  - 4 x weekly - 30 sec- 1 min hold      Time-based treatments/modalities:    Physical Therapy Timed Treatment Charges  Therapeutic exercise minutes (CPT 69423): 46 minutes    ASSESSMENT:   Response to treatment: See PN    PLAN/RECOMMENDATIONS:   Plan for treatment: therapy treatment to continue next visit.  Planned interventions for next visit: continue with current treatment.     "

## 2023-06-06 NOTE — OP THERAPY PROGRESS SUMMARY
Outpatient Physical Therapy  PROGRESS SUMMARY NOTE      [facility] Outpatient Physical Therapy  [address]  [place] [address]  Phone:  [phone]  Fax:  [phone]    Date of Visit: 06/06/2023    Patient: Mariza Steward  YOB: 1960  MRN: [id number]     Referring Provider: Janet Holguin M.D.  [address] [facility]  [address]  [place] [address]   Referring Diagnosis Low back pain, unspecified [M54.50];Other chronic pain [G89.29];Pain in right leg [M79.604];Pain in left leg [M79.605]     Visit Diagnoses     ICD-10-CM   1. Chronic bilateral low back pain without sciatica  M54.50    G89.29   2. Bilateral lower extremity pain  M79.604    M79.605   3. Lumbosacral pain  M54.50   4. Sacroiliac joint dysfunction  M53.3       Rehab Potential: excellent    Progress Report Period: 5/3/23-6/6/23    Functional Assessment Used          Objective Findings and Assessment:   Patient progression towards goals:   Ms. Steward has been seen for 5 PT sessions treating her chronic lower back pain and complaints of general weakness. Treatment has focused on progressive therex for restoration of lumbopelvic mobility, midline strength and functional loading patterns. She is showing good attendance and compliance with her HEP. We have been able to make advancements to her HEP each session. She is now tolerating moderate level core stability loading and 15-20# for bilateral support functional lifting (squat/dead lift).  Ms. Steward has a goal to be able to stand from the floor without the use of her hands for support. She requires additional skilled services to assist progression to heavier loads required for her IADLs, as well as unilateral loading.    Goals:   Short Term Goals:     - Pt is able to single leg bridge to full height x 10 reps ea  - Pt is able to lunge to full depth with 1 hand support  - Pt is able to deadlift 30# x 10 reps without cuing  Short term goal time span:   1-2 weeks      Long Term Goals:      - Improve RMQ to 0/24  - Pt is able to stand from the floor without UE support  - Pt demonstrates independence with a HEP for continued management of this problem beyond discharge from therapy.     Long term goal time span:  6-8 weeks    Plan:   Planned therapy interventions:  Functional Training, Self Care (CPT 61440), Therapeutic Activities (CPT 18784), Therapeutic Exercise (CPT 09525), E Stim Unattended (CPT 19506), Gait Training (CPT 85071), Manual Therapy (CPT 74077) and Neuromuscular Re-education (CPT 53954)  Frequency:  1x week  Duration in weeks:  8  Plan details:  Every other week      Referring provider co-signature:  I have reviewed this plan of care and my co-signature certifies the need for services.     Certification Period: 06/06/2023 to 08/01/23    Physician Signature: ________________________________ Date: ______________

## 2023-06-13 ENCOUNTER — APPOINTMENT (OUTPATIENT)
Dept: PHYSICAL THERAPY | Facility: MEDICAL CENTER | Age: 63
End: 2023-06-13
Attending: FAMILY MEDICINE
Payer: COMMERCIAL

## 2023-06-19 NOTE — OP THERAPY DAILY TREATMENT
"  Outpatient Physical Therapy  DAILY TREATMENT     [facility] Outpatient Physical Therapy  [address]  [place] [address]  Phone:  [phone]  Fax:  [phone]    Date: 06/20/2023    Patient: Mariza Steward  YOB: 1960  MRN: [id number]     Time Calculation    Start time: 0800  Stop time: 0845 Time Calculation (min): 45 minutes         Chief Complaint: Back Problem    Visit #: 6    SUBJECTIVE:  I've been doing ok. Achy in the lower back after doing some weeding yesterday. This is outside her typical activity level. Has had a couple more episodes of L outer thigh pain/aching in the night, improves within 30 mins of repositioning.     OBJECTIVE:      Therapeutic Exercises (CPT 31955):     1. TM, 2.5 mph x 5 min    2. Cat/cow, x 10 ea    3. pigeon pose, x 1 min ea    4. spinal twist with fig 4, x 30\" ea    5. dead bug, 1x10 ea to review for HEP, indep    6. marching bridge, 1x10 ea to review for HEP, indep    7. Single leg bridge, 2x5 ea, able to complete short sets without cramping.    8. KB trunk side bend, 15# x 10 ea    9. single leg deadlift, 15# from a 4\" step x 15 ea    10. ute curl, 15# x 10      Therapeutic Exercise Summary:   Access Code: [email or web address]  URL: [email or web address]  Date: 06/06/2023  Prepared by: Courtney Harris    Exercises  - Side Plank on Knees  - 4 x weekly - 2-3 sets - 10 sec hold  - Supine Dead Bug with Leg Extension  - 4 x weekly - 2-3 sets - 10 reps  - Marching Bridge  - 4 x weekly - 2-3 sets - 10-20 reps  - Single Leg Bridge  - 4 x weekly  - Mini Lunge  - 4 x weekly - 30 sec- 1 min hold      Time-based treatments/modalities:    Physical Therapy Timed Treatment Charges  Therapeutic exercise minutes (CPT 96045): 45 minutes    ASSESSMENT:   Response to treatment: Improving SL bridge with less cramping. Unstable with unilateral deadlifting, but able to recruit hip hinge with cuing. Should continue to further develop " strength for functional loading to support IADLs.     PLAN/RECOMMENDATIONS:   Plan for treatment: therapy treatment to continue next visit.  Planned interventions for next visit: continue with current treatment.

## 2023-06-20 ENCOUNTER — PHYSICAL THERAPY (OUTPATIENT)
Dept: PHYSICAL THERAPY | Facility: MEDICAL CENTER | Age: 63
End: 2023-06-20
Attending: FAMILY MEDICINE
Payer: COMMERCIAL

## 2023-06-20 DIAGNOSIS — M53.3 SACROILIAC JOINT DYSFUNCTION: ICD-10-CM

## 2023-06-20 DIAGNOSIS — M54.50 CHRONIC BILATERAL LOW BACK PAIN WITHOUT SCIATICA: ICD-10-CM

## 2023-06-20 DIAGNOSIS — G89.29 CHRONIC BILATERAL LOW BACK PAIN WITHOUT SCIATICA: ICD-10-CM

## 2023-06-20 DIAGNOSIS — M54.50 LUMBOSACRAL PAIN: ICD-10-CM

## 2023-06-20 DIAGNOSIS — M79.605 BILATERAL LOWER EXTREMITY PAIN: ICD-10-CM

## 2023-06-20 DIAGNOSIS — M79.604 BILATERAL LOWER EXTREMITY PAIN: ICD-10-CM

## 2023-06-20 PROCEDURE — 97110 THERAPEUTIC EXERCISES: CPT

## 2023-07-03 ENCOUNTER — PHYSICAL THERAPY (OUTPATIENT)
Dept: PHYSICAL THERAPY | Facility: MEDICAL CENTER | Age: 63
End: 2023-07-03
Attending: FAMILY MEDICINE
Payer: COMMERCIAL

## 2023-07-03 DIAGNOSIS — M53.3 SACROILIAC JOINT DYSFUNCTION: ICD-10-CM

## 2023-07-03 DIAGNOSIS — M54.50 CHRONIC BILATERAL LOW BACK PAIN WITHOUT SCIATICA: ICD-10-CM

## 2023-07-03 DIAGNOSIS — M79.605 BILATERAL LOWER EXTREMITY PAIN: ICD-10-CM

## 2023-07-03 DIAGNOSIS — M79.604 BILATERAL LOWER EXTREMITY PAIN: ICD-10-CM

## 2023-07-03 DIAGNOSIS — G89.29 CHRONIC BILATERAL LOW BACK PAIN WITHOUT SCIATICA: ICD-10-CM

## 2023-07-03 DIAGNOSIS — M54.50 LUMBOSACRAL PAIN: ICD-10-CM

## 2023-07-03 PROCEDURE — 97110 THERAPEUTIC EXERCISES: CPT

## 2023-07-03 NOTE — OP THERAPY DAILY TREATMENT
"  Outpatient Physical Therapy  DAILY TREATMENT     [facility] Outpatient Physical Therapy  [address]  [place] [address]  Phone:  [phone]  Fax:  [phone]    Date: 07/03/2023    Patient: Mariza Steward  YOB: 1960  MRN: [id number]     Time Calculation    Start time: 0800  Stop time: 0845 Time Calculation (min): 45 minutes         Chief Complaint: Back Problem    Visit #: 7    SUBJECTIVE:  My back has been feeling pretty good, but I continue to wake up with some sciatica on the L and some pain into the L groin. The HEP does seem to help. Did 15 mins of stretching prior to coming in today. Would like more advice on how to manage this.     OBJECTIVE:      Therapeutic Exercises (CPT 88861):     1. Fig4 ball roll, x 10 ea    2. ball bridge, 5\" x 10 ea    3. bolster bridge with psoas march, 2x7 ea    4. SL clam/reverse clam, x 20 ea    5. SL hip abd, x 20 ea    6. lunges, 1/2 dep with 1 hand support x 10 ea    7. 1/2 kneeling hip CAR, x 10 ea, challenging, My legs feel like jello.    10. Discussed sleep position. Pt will consider using a pillow bw her knees.      Therapeutic Exercise Summary:   Access Code: [email or web address]  URL: [email or web address]  Date: 06/06/2023  Prepared by: Courtney Harris    Exercises  - Side Plank on Knees  - 4 x weekly - 2-3 sets - 10 sec hold  - Supine Dead Bug with Leg Extension  - 4 x weekly - 2-3 sets - 10 reps  - Marching Bridge  - 4 x weekly - 2-3 sets - 10-20 reps  - Single Leg Bridge  - 4 x weekly  - Mini Lunge  - 4 x weekly - 30 sec- 1 min hold  - SL clam/reverse clam  - 1/2 kneeling hip CAR.       Time-based treatments/modalities:    Physical Therapy Timed Treatment Charges  Therapeutic exercise minutes (CPT 20776): 45 minutes    ASSESSMENT:   Response to treatment: L hip pain improves after AROM tasks challenging full use of rotation/hip flexion with emphasis on midline stability. Fatigues quickly. Is " considering piliates as a resource to support transition from therapy at d/c. She will reach out regarding pricing this week.      PLAN/RECOMMENDATIONS:   Plan for treatment: therapy treatment to continue next visit.  Planned interventions for next visit: continue with current treatment.

## 2023-07-20 ENCOUNTER — APPOINTMENT (OUTPATIENT)
Dept: PHYSICAL THERAPY | Facility: MEDICAL CENTER | Age: 63
End: 2023-07-20
Attending: FAMILY MEDICINE
Payer: COMMERCIAL

## 2023-08-07 ENCOUNTER — APPOINTMENT (OUTPATIENT)
Dept: PHYSICAL THERAPY | Facility: MEDICAL CENTER | Age: 63
End: 2023-08-07
Attending: FAMILY MEDICINE
Payer: COMMERCIAL

## 2023-08-20 DIAGNOSIS — E87.6 HYPOKALEMIA: ICD-10-CM

## 2023-08-20 PROCEDURE — RXMED WILLOW AMBULATORY MEDICATION CHARGE: Performed by: FAMILY MEDICINE

## 2023-08-21 PROCEDURE — RXMED WILLOW AMBULATORY MEDICATION CHARGE: Performed by: FAMILY MEDICINE

## 2023-08-21 RX ORDER — POTASSIUM CHLORIDE 750 MG/1
10 TABLET, EXTENDED RELEASE ORAL
Qty: 90 TABLET | Refills: 2 | Status: SHIPPED | OUTPATIENT
Start: 2023-08-21

## 2023-08-28 ENCOUNTER — PHARMACY VISIT (OUTPATIENT)
Dept: PHARMACY | Facility: MEDICAL CENTER | Age: 63
End: 2023-08-28
Payer: COMMERCIAL

## 2023-08-29 ENCOUNTER — PHYSICAL THERAPY (OUTPATIENT)
Dept: PHYSICAL THERAPY | Facility: MEDICAL CENTER | Age: 63
End: 2023-08-29
Attending: FAMILY MEDICINE
Payer: COMMERCIAL

## 2023-08-29 DIAGNOSIS — M54.50 CHRONIC BILATERAL LOW BACK PAIN WITHOUT SCIATICA: ICD-10-CM

## 2023-08-29 DIAGNOSIS — G89.29 CHRONIC BILATERAL LOW BACK PAIN WITHOUT SCIATICA: ICD-10-CM

## 2023-08-29 DIAGNOSIS — M79.605 BILATERAL LOWER EXTREMITY PAIN: ICD-10-CM

## 2023-08-29 DIAGNOSIS — M79.604 BILATERAL LOWER EXTREMITY PAIN: ICD-10-CM

## 2023-08-29 PROCEDURE — 97110 THERAPEUTIC EXERCISES: CPT

## 2023-08-29 NOTE — OP THERAPY DAILY TREATMENT
Outpatient Physical Therapy  DAILY TREATMENT     [facility] Outpatient Physical Therapy  [address]  [place] [address]  Phone:  [phone]  Fax:  [phone]    Date: 08/29/2023    Patient: Mariza Steward  YOB: 1960  MRN: [id number]     Time Calculation    Start time: 0800  Stop time: 0845 Time Calculation (min): 45 minutes         Chief Complaint: Back Problem    Visit #: 8    SUBJECTIVE:  Pt returns after a 6 week lapse related to her schedule limiting attendance. States she has been doing well aside from tweaking her back 2 weeks ago. Was reaching down and to the left for a towel. The pain was all L sided in the spine, aching in the hip and knee. Had been doing the HEP 2x/week leading up to this. Happy to report she felt comfortable employing her HEP techniques to get back on track and is feeling about 85% improved overall. With being able to contain the flare up on her own, she is feeling comfortable with d/c today.     OBJECTIVE:      Therapeutic Exercises (CPT 93671):     1. Ball roll ups, 3 way x 10 ea    2. Bridging series review, standard->marching->single leg. Ball briding to support standing.    3. Straight leg raise, supine-> sitting edge of bed. Eventual long sitting, but caused cramping today    4. sit to stand, standard-> tap-> without a target-> goblet squat    5. deadlift, review of patterning 0#    6. lunges, 1/2 dep with 1 hand support x 10 ea    7. review of floor recovery, transitioning through half kneeling.      Therapeutic Exercise Summary:   Access Code: [email or web address]  URL: [email or web address]  Date: 06/06/2023  Prepared by: Courtney Harris    Exercises  - Side Plank on Knees  - 4 x weekly - 2-3 sets - 10 sec hold  - Supine Dead Bug with Leg Extension  - 4 x weekly - 2-3 sets - 10 reps  - Marching Bridge  - 4 x weekly - 2-3 sets - 10-20 reps  - Single Leg Bridge  - 4 x weekly  - Mini Lunge  - 4 x weekly - 30 sec- 1  min hold  - SL clam/reverse clam  - 1/2 kneeling hip CAR.       Time-based treatments/modalities:    Physical Therapy Timed Treatment Charges  Therapeutic exercise minutes (CPT 95811): 45 minutes    ASSESSMENT:   Response to treatment: see d/c    PLAN/RECOMMENDATIONS:   Plan for treatment: no further treatment needed.

## 2023-08-29 NOTE — OP THERAPY DISCHARGE SUMMARY
Outpatient Physical Therapy  DISCHARGE SUMMARY NOTE      [facility] Outpatient Physical Therapy  [address]  [place] [address]  Phone:  [phone]  Fax:  [phone]    Date of Visit: 08/29/2023    Patient: Mariza Steward  YOB: 1960  MRN: [id number]     Referring Provider: Janet Holguin M.D.  [address] [facility]  [address]  [place] [address]   Referring Diagnosis Low back pain, unspecified [M54.50];Other chronic pain [G89.29];Pain in right leg [M79.604];Pain in left leg [M79.605]         Functional Assessment Used        Your patient is being discharged from Physical Therapy with the following comments:   Goals met    Comments:  Mariza was seen for 8 PT sessions treating her chronic lower back pain with intermittent referral to the L hip/thigh. Sessions were biweekly to focus on re-establishing a prior HEP regimen that she had fallen out of practice with. Mariza is a pleasure to work with and demonstrated good compliance with the recommendations. She did have a flare up of pain 2 weeks ago and is pleased to report she felt confident getting back on to baseline using the knowledge gained in therapy. At this point, she requires no further skilled guidance, but would benefit from transitioning into a guided strength and conditioning program (possible community based PT) to support long term management.     Recommendations:  D/C to HEP. Pt will research community based exercise options. Welcome to return with a new referral if there is change in status.     Courtney Harris, PT, DPT    Date: 8/29/2023

## 2023-11-19 PROCEDURE — RXMED WILLOW AMBULATORY MEDICATION CHARGE: Performed by: FAMILY MEDICINE

## 2023-12-02 ENCOUNTER — PHARMACY VISIT (OUTPATIENT)
Dept: PHARMACY | Facility: MEDICAL CENTER | Age: 63
End: 2023-12-02
Payer: COMMERCIAL

## 2024-01-04 ENCOUNTER — NON-PROVIDER VISIT (OUTPATIENT)
Dept: INTERNAL MEDICINE | Facility: IMAGING CENTER | Age: 64
End: 2024-01-04
Payer: COMMERCIAL

## 2024-01-04 DIAGNOSIS — Z20.822 ENCOUNTER FOR LABORATORY TESTING FOR COVID-19 VIRUS: ICD-10-CM

## 2024-01-04 DIAGNOSIS — J02.9 SORE THROAT: ICD-10-CM

## 2024-01-04 LAB
FLUAV RNA SPEC QL NAA+PROBE: NEGATIVE
FLUBV RNA SPEC QL NAA+PROBE: NEGATIVE
RSV RNA SPEC QL NAA+PROBE: NEGATIVE
S PYO DNA SPEC NAA+PROBE: NOT DETECTED
SARS-COV-2 RNA RESP QL NAA+PROBE: NEGATIVE

## 2024-01-04 PROCEDURE — 87651 STREP A DNA AMP PROBE: CPT | Performed by: FAMILY MEDICINE

## 2024-01-04 PROCEDURE — 0241U POCT CEPHEID COV-2, FLU A/B, RSV - PCR: CPT | Performed by: FAMILY MEDICINE

## 2024-01-05 ENCOUNTER — OFFICE VISIT (OUTPATIENT)
Dept: INTERNAL MEDICINE | Facility: IMAGING CENTER | Age: 64
End: 2024-01-05
Payer: COMMERCIAL

## 2024-01-05 ENCOUNTER — PHARMACY VISIT (OUTPATIENT)
Dept: PHARMACY | Facility: MEDICAL CENTER | Age: 64
End: 2024-01-05
Payer: COMMERCIAL

## 2024-01-05 VITALS
HEIGHT: 71 IN | RESPIRATION RATE: 17 BRPM | TEMPERATURE: 98.6 F | OXYGEN SATURATION: 98 % | DIASTOLIC BLOOD PRESSURE: 62 MMHG | WEIGHT: 179.23 LBS | BODY MASS INDEX: 25.09 KG/M2 | HEART RATE: 83 BPM | SYSTOLIC BLOOD PRESSURE: 122 MMHG

## 2024-01-05 DIAGNOSIS — J06.9 UPPER RESPIRATORY TRACT INFECTION, UNSPECIFIED TYPE: ICD-10-CM

## 2024-01-05 PROCEDURE — 3078F DIAST BP <80 MM HG: CPT | Performed by: FAMILY MEDICINE

## 2024-01-05 PROCEDURE — RXMED WILLOW AMBULATORY MEDICATION CHARGE: Performed by: FAMILY MEDICINE

## 2024-01-05 PROCEDURE — 99213 OFFICE O/P EST LOW 20 MIN: CPT | Performed by: FAMILY MEDICINE

## 2024-01-05 PROCEDURE — 3074F SYST BP LT 130 MM HG: CPT | Performed by: FAMILY MEDICINE

## 2024-01-05 RX ORDER — METHYLPREDNISOLONE 4 MG/1
TABLET ORAL
Qty: 21 TABLET | Refills: 0 | Status: SHIPPED | OUTPATIENT
Start: 2024-01-05 | End: 2024-02-28

## 2024-01-05 RX ORDER — AMOXICILLIN AND CLAVULANATE POTASSIUM 875; 125 MG/1; MG/1
1 TABLET, FILM COATED ORAL 2 TIMES DAILY
Qty: 14 TABLET | Refills: 0 | Status: SHIPPED | OUTPATIENT
Start: 2024-01-05 | End: 2024-01-12

## 2024-01-05 ASSESSMENT — FIBROSIS 4 INDEX: FIB4 SCORE: 0.91

## 2024-01-05 NOTE — PROGRESS NOTES
"Chief Complaint   Patient presents with    Pharyngitis     Started Monday with extreme fatigue and then later that night sore throat. It has progressively gotten worse.       HPI:  Patient is a 63 y.o. female established patient who presents today for evaluation of new illness symptoms that started on Monday. She has been experiencing significant sore throat pain, fatigue, congestion, PND without associated N/V/D/F. Swabs done yesterday negative for COVID/RSV/Influenza/strep but she continues to experience worsening symptoms. This morning she reports throat pain was 8/10 upon wakening, and she has not been at work this week. She has been under tremendous stress due to the death of her mother, who was on hospice status since early December.     Patient Active Problem List    Diagnosis Date Noted    Breast cancer (DCIS), right upper medial (Jan. 2022) 01/28/2022    Vitamin D deficiency 06/17/2019    Generalized anxiety disorder 12/12/2018    Gastroesophageal reflux disease without esophagitis 12/12/2018    Family history of malignant neoplasm of colon 12/30/2016    Essential tremor 12/30/2016    Hypertension 07/15/2015       Past medical, surgical, family, and social history was reviewed and updated in Epic chart by me today.     Medications and allergies reviewed and updated in Epic chart by me today.     ROS:  Pertinent positives listed above in HPI. All other systems have been reviewed and are negative.    PE:   /62 (BP Location: Left arm, Patient Position: Sitting, BP Cuff Size: Adult)   Pulse 83   Temp 37 °C (98.6 °F) (Temporal)   Resp 17   Ht 1.803 m (5' 11\")   Wt 81.3 kg (179 lb 3.7 oz)   LMP 12/12/2005   SpO2 98%   BMI 25.00 kg/m²   Vital signs reviewed with patient.     Gen: Well developed; well nourished; no acute distress; non toxic/tired appearance   HEENT: Normocephalic; atraumatic; PEERLA b/l; sclera clear b/l; b/l external auditory canals WNL; b/l TM WNL; nares patent; oropharynx clear; " posterior oropharynx bright red and infected; oral mucosa moist; tongue midline; dentition adequate; congested    Neck: anterior cervical chain adenopathy b/l; no thyromegaly  CV: Regular rate and rhythm; S1/ S2 present; no murmur, gallop or rub noted  Pulm: No respiratory distress; clear to ascultation b/l; no wheezing or stridor noted b/l  Skin: Warm and dry; no rashes noted   Neuro: No focal deficits noted   Psych: AAOx4; mood and affect are appropriate    A/P:  1. Upper respiratory tract infection, unspecified type  New illness present since Monday as described above in HPI. Recommend patient start Augmentin and Medrol dose pack today, rest, hydrate, stay out of extreme cold temperatures as able, and follow clinical response. New RX sent to pharmacy, and patient encouraged to contact me if condition does not resolve accordingly. Work excuse note provided to patient today covering dates 1/2/24-1/9/24.   - amoxicillin-clavulanate (AUGMENTIN) 875-125 MG Tab; Take 1 Tablet by mouth 2 times a day for 7 days.  Dispense: 14 Tablet; Refill: 0  - methylPREDNISolone (MEDROL DOSEPAK) 4 MG Tablet Therapy Pack; Follow as scheduled per package instructions  Dispense: 21 Tablet; Refill: 0

## 2024-02-18 PROCEDURE — RXMED WILLOW AMBULATORY MEDICATION CHARGE: Performed by: FAMILY MEDICINE

## 2024-02-28 ENCOUNTER — OFFICE VISIT (OUTPATIENT)
Dept: INTERNAL MEDICINE | Facility: IMAGING CENTER | Age: 64
End: 2024-02-28
Payer: COMMERCIAL

## 2024-02-28 VITALS
OXYGEN SATURATION: 96 % | HEIGHT: 71 IN | HEART RATE: 71 BPM | TEMPERATURE: 98.6 F | DIASTOLIC BLOOD PRESSURE: 70 MMHG | SYSTOLIC BLOOD PRESSURE: 122 MMHG | WEIGHT: 179.23 LBS | BODY MASS INDEX: 25.09 KG/M2 | RESPIRATION RATE: 17 BRPM

## 2024-02-28 DIAGNOSIS — F33.1 MODERATE RECURRENT MAJOR DEPRESSION (HCC): ICD-10-CM

## 2024-02-28 DIAGNOSIS — G47.09 SECONDARY INSOMNIA: ICD-10-CM

## 2024-02-28 DIAGNOSIS — Z86.000 HISTORY OF DUCTAL CARCINOMA IN SITU (DCIS) OF BREAST: ICD-10-CM

## 2024-02-28 DIAGNOSIS — E55.9 VITAMIN D DEFICIENCY: ICD-10-CM

## 2024-02-28 DIAGNOSIS — Z71.85 VACCINE COUNSELING: ICD-10-CM

## 2024-02-28 DIAGNOSIS — Z00.00 HEALTH CARE MAINTENANCE: ICD-10-CM

## 2024-02-28 DIAGNOSIS — M65.4 DE QUERVAIN'S TENOSYNOVITIS, RIGHT: ICD-10-CM

## 2024-02-28 DIAGNOSIS — I10 PRIMARY HYPERTENSION: ICD-10-CM

## 2024-02-28 PROBLEM — C50.919 BREAST CANCER (HCC): Status: RESOLVED | Noted: 2022-01-28 | Resolved: 2024-02-28

## 2024-02-28 PROCEDURE — 99214 OFFICE O/P EST MOD 30 MIN: CPT | Performed by: FAMILY MEDICINE

## 2024-02-28 PROCEDURE — 3078F DIAST BP <80 MM HG: CPT | Performed by: FAMILY MEDICINE

## 2024-02-28 PROCEDURE — 3074F SYST BP LT 130 MM HG: CPT | Performed by: FAMILY MEDICINE

## 2024-02-28 RX ORDER — ALPRAZOLAM 0.25 MG/1
0.25 TABLET ORAL NIGHTLY PRN
Qty: 30 TABLET | Refills: 2 | Status: SHIPPED | OUTPATIENT
Start: 2024-02-28 | End: 2024-03-31

## 2024-02-28 RX ORDER — PAROXETINE HYDROCHLORIDE 20 MG/1
20 TABLET, FILM COATED ORAL DAILY
Qty: 30 TABLET | Refills: 3 | Status: SHIPPED | OUTPATIENT
Start: 2024-02-28

## 2024-02-28 ASSESSMENT — PATIENT HEALTH QUESTIONNAIRE - PHQ9
5. POOR APPETITE OR OVEREATING: 0 - NOT AT ALL
CLINICAL INTERPRETATION OF PHQ2 SCORE: 3
SUM OF ALL RESPONSES TO PHQ QUESTIONS 1-9: 11

## 2024-02-28 ASSESSMENT — FIBROSIS 4 INDEX: FIB4 SCORE: 0.92

## 2024-02-28 NOTE — PROGRESS NOTES
Chief Complaint   Patient presents with    Wrist Pain     Right wrist pain since last July/August. She has seen Dr. Anderson for this in the past and was given a brace. Predominately her thumb joint, side to side movement, and typing. She has been taking advil and voltaren for pain relief.    Depression     Would like to discuss antidepressant. Has tried effexor and citalopram.        HPI:  Patient is a 64 y.o. female established patient who presents today to discuss current health concerns. Since last summer, she has been experiencing right thumb and wrist pain (right hand dominant) when moving her thumb joint repetitively (using mouse/using phone/typing for example) not well managed with PRN Advil/Voltaran gel use. She has also been wearing a splint provided to her by Dr. Anderson and continues to experience significant pain and discomfort. She is also processing the death of her mother after illness and hospice services, and she has noticed feeling depressed overall (exacerbated by ongoing work stressors also). She see a mental health counselor but reports keeping her home blinds shut, not leaving her house often when not working, and not meaningfully engaging with other people in meaningful conversations. She no longer walks her dog after previous injury while on a walk and does not currently play with her dog in their yard. She has worries about her current extended family dynamics and turned 64 years old earlier this month. She is vaccine and health care screening eligible, and denies other new concerns at this time.      Patient Active Problem List    Diagnosis Date Noted    Moderate recurrent major depression (HCC) 02/28/2024    Secondary insomnia 02/28/2024    De Quervain's tenosynovitis, right 02/28/2024    History of ductal carcinoma in situ (DCIS) of breast 03/2022    Vitamin D deficiency 06/17/2019    Generalized anxiety disorder 12/12/2018    Gastroesophageal reflux disease without esophagitis 12/12/2018     "Family history of malignant neoplasm of colon 12/30/2016    Essential tremor 12/30/2016    Hypertension 07/15/2015       Past medical, surgical, family, and social history was reviewed and updated in Epic chart by me today.     Medications and allergies reviewed and updated in Epic chart by me today.     ROS:  Pertinent positives listed above in HPI. All other systems have been reviewed and are negative.    PE:   /70 (BP Location: Left arm, Patient Position: Sitting, BP Cuff Size: Adult)   Pulse 71   Temp 37 °C (98.6 °F) (Temporal)   Resp 17   Ht 1.803 m (5' 11\")   Wt 81.3 kg (179 lb 3.7 oz)   LMP 12/12/2005   SpO2 96%   BMI 25.00 kg/m²   Vital signs reviewed with patient.     Gen: Well developed; well nourished; no acute distress; age appropriate appearance   CV: Regular rate and rhythm; S1/ S2 present; no murmur, gallop or rub noted  Pulm: No respiratory distress; clear to ascultation b/l; no wheezing or stridor noted b/l  RUE: intact without gross deformity; reproducible right thumb tendon pain when patient does radial abduction (AROM and with resistance applied); no evidence of carpal tunnel with testing   Extremities: No peripheral edema b/l LE extremities/ no clubbing nor cyanosis noted  Skin: Warm and dry; no rashes noted   Neuro: No focal deficits noted   Psych: AAOx4; mood and affect are appropriate and engaging     A/P:  1. De Quervain's tenosynovitis, right  New condition present for the past few months as described above in HPI. She has been appropriately using Advil and Voltaren gel PRN as well as wearing a brace provided to her by Dr. Anderson. She is right hand dominant and continues to experience significant related pain. I agree that specialty evaluation is warranted, and new referral made to Dr. Hatch per patient request.   - Referral to Pain Clinic    2. Moderate recurrent major depression (HCC)  Patient has been under a tremendous amount of stress related to her mother's illness and " subsequent passing. Despite seeing a mental health counselor, PHQ-9  score is 11 today, and her reported symptoms indicate need to start daily medication use. She has failed Citalopram, Effexor, and Wellbutrin use in the past, and medications options discussed with her. Recommend patient start Paxil 20 mg daily, focus on doing more outside activities while at home on nights and weekends with her dog, continue current mental health counseling, and will follow. Patient educated about signs/symptoms that would warrant immediate medication discontinuation, and no safety concerns present. New RX sent to pharmacy.   - PARoxetine (PAXIL) 20 MG Tab; Take 1 Tablet by mouth every day.  Dispense: 30 Tablet; Refill: 3  - Patient has been identified as having a positive depression screening. Appropriate orders and counseling have been given.    3. Secondary insomnia  Recurrent condition for patient managed in the past with PRN low dose Xanax use. Patient can resume PRN Xanax use for symptom management.  reviewed today and no concerns noted. Pt is well versed in safe use of controlled medication, and benefit of use outweighs risk at this time. New RX sent to pharmacy.   - ALPRAZolam (XANAX) 0.25 MG Tab; Take 1 Tablet by mouth at bedtime as needed for Sleep for up to 30 days.  Dispense: 30 Tablet; Refill: 2    4. History of ductal carcinoma in situ (DCIS) of breast  Patient has history of abnormal screening  mammogram 12/17/21 (calcifications upper inner quadrant of right breast) and subsequently was diagnosed with right breast DCIS on biopsy. She underwent  wire localized partial mastectomy and mastopexy 3/8/22 by Dr. Aguilar. Pathology c/w ductal carcinoma in situ, intermediate nuclear grade with clear margins, 3 mm in greatest dimension. She declined further treatment after meeting with Dr. Alfaro and Dr. Up at that time. Recommend patient repeat imaging this year for ongoing surveillance, and I provided her with  [facility] number to call for appointment scheduling.   - MA-DIAGNOSTIC MAMMO BILAT W/TOMOSYNTHESIS W/CAD; Future    5. Vaccine counseling  Patient is aware of vaccine eligibility.     6. Vitamin D deficiency  Patient is compliant with Vitamin D supplementation and is due for repeat Vitamin D level for ongoing management.   - VITAMIN D,25 HYDROXY (DEFICIENCY); Future    7. Primary hypertension  Stable/ recommend patient continue daily Lisinopril 20 mg and Inderal 20 mg and she is due for annual fasting labs for ongoing management.   - CBC WITH DIFFERENTIAL; Future  - Comp Metabolic Panel; Future    8. Health care maintenance  Patient declines repeat colon cancer screening and dexa scan at this time. She is due for annual fasting labs for ongoing health care management.   - TSH; Future  - FREE THYROXINE; Future  - Lipid Profile; Future  - HEMOGLOBIN A1C; Future  - VITAMIN B12; Future  - FOLATE; Future  - IRON/TOTAL IRON BIND; Future     Recommend patient follow up with me in one month for recheck/ PRN sooner if new need arise.

## 2024-03-01 ENCOUNTER — PHARMACY VISIT (OUTPATIENT)
Dept: PHARMACY | Facility: MEDICAL CENTER | Age: 64
End: 2024-03-01
Payer: COMMERCIAL

## 2024-03-01 PROCEDURE — RXMED WILLOW AMBULATORY MEDICATION CHARGE: Performed by: FAMILY MEDICINE

## 2024-03-13 ENCOUNTER — APPOINTMENT (OUTPATIENT)
Dept: PHYSICAL MEDICINE AND REHAB | Facility: MEDICAL CENTER | Age: 64
End: 2024-03-13
Payer: COMMERCIAL

## 2024-03-13 VITALS
HEIGHT: 71 IN | HEART RATE: 76 BPM | BODY MASS INDEX: 25.28 KG/M2 | TEMPERATURE: 96.8 F | OXYGEN SATURATION: 96 % | SYSTOLIC BLOOD PRESSURE: 130 MMHG | DIASTOLIC BLOOD PRESSURE: 80 MMHG | WEIGHT: 180.6 LBS

## 2024-03-13 DIAGNOSIS — M65.4 DE QUERVAIN'S TENOSYNOVITIS, RIGHT: ICD-10-CM

## 2024-03-13 DIAGNOSIS — M25.531 RIGHT WRIST PAIN: ICD-10-CM

## 2024-03-13 DIAGNOSIS — M79.641 RIGHT HAND PAIN: ICD-10-CM

## 2024-03-13 PROCEDURE — 99204 OFFICE O/P NEW MOD 45 MIN: CPT | Performed by: PHYSICAL MEDICINE & REHABILITATION

## 2024-03-13 PROCEDURE — 3079F DIAST BP 80-89 MM HG: CPT | Performed by: PHYSICAL MEDICINE & REHABILITATION

## 2024-03-13 PROCEDURE — 3075F SYST BP GE 130 - 139MM HG: CPT | Performed by: PHYSICAL MEDICINE & REHABILITATION

## 2024-03-13 PROCEDURE — 1126F AMNT PAIN NOTED NONE PRSNT: CPT | Performed by: PHYSICAL MEDICINE & REHABILITATION

## 2024-03-13 ASSESSMENT — PAIN SCALES - GENERAL: PAINLEVEL: NO PAIN

## 2024-03-13 ASSESSMENT — FIBROSIS 4 INDEX: FIB4 SCORE: 0.92

## 2024-03-13 ASSESSMENT — PATIENT HEALTH QUESTIONNAIRE - PHQ9
5. POOR APPETITE OR OVEREATING: 0 - NOT AT ALL
CLINICAL INTERPRETATION OF PHQ2 SCORE: 2
SUM OF ALL RESPONSES TO PHQ QUESTIONS 1-9: 6

## 2024-03-13 NOTE — PROGRESS NOTES
New patient note    Interventional spine and Pain  Physiatry (physical medicine and  Rehabilitation)     Date of service: See epic    Chief complaint:   Chief Complaint   Patient presents with    New Patient     Wrist pain        Referring provider: Janet Holguin M.D.     HISTORY    HPI: Mariza Steward 64 y.o.  who presents today with Diagnoses of Right wrist pain, De Quervain's tenosynovitis, right, and Right hand pain were pertinent to this visit.    HPI    Acute on chronic right wrist pain mostly towards the tendons connecting to the right thumb especially with wrist movements and mousing.  She denies radiating pain.  Denies numbness tingling or weakness.         ROS:   Red Flags ROS:   Fever, Chills, Sweats: Denies  Involuntary Weight Loss: Denies  Bladder Incontinence: Denies  Bowel Incontinence: denies  Saddle Anesthesia: Denies    All other systems reviewed and negative.       PMHx:   Past Medical History:   Diagnosis Date    Anesthesia 02/24/2022    PONV, chills/shaking, motion sickness    Anxiety and depression 12/12/2018    Breast cancer (DCIS), right upper medial  01/28/2022    Cervicalgia     Depression with anxiety 02/24/2022    Gastroesophageal reflux disease without esophagitis 12/12/2018    Generalized anxiety disorder 12/12/2018    GERD (gastroesophageal reflux disease)     Heart burn 02/24/2022    medicated prn    High cholesterol 02/24/2022    non medicated    Hypertension 02/24/2022    medicated    Indigestion 02/24/2022    medicated prn    Pain 02/24/2022    low back pain    PONV (postoperative nausea and vomiting) 02/24/2022         Current Outpatient Medications on File Prior to Visit   Medication Sig Dispense Refill    PARoxetine (PAXIL) 20 MG Tab Take 1 Tablet by mouth every day. 30 Tablet 3    ALPRAZolam (XANAX) 0.25 MG Tab Take 1 Tablet by mouth at bedtime as needed for Sleep for up to 30 days. 30 Tablet 2    potassium chloride SA (K-DUR) 10 MEQ Tab CR Take 1 Tablet by mouth  every day. 90 Tablet 2    propranolol (INDERAL) 20 MG Tab Take 1 Tablet by mouth every day. 90 Tablet 3    lisinopril (PRINIVIL) 20 MG Tab Take 1 Tablet by mouth every day. 90 Tablet 3    B Complex Vitamins (B COMPLEX PO) Take  by mouth.      Cholecalciferol (VITAMIN D3 PO) Take 400 Units by mouth. Plus calcium 600 mg      acetaminophen (TYLENOL) 500 MG Tab Take 500-1,000 mg by mouth every 6 hours as needed.      Al Hyd-Mg Tr-Alg Ac-Sod Bicarb (GAVISCON-2 PO) Take  by mouth every day.      loratadine (CLARITIN) 10 MG Tab Take 10 mg by mouth as needed.      Magnesium 200 MG Tab Take 200 mg by mouth.       No current facility-administered medications on file prior to visit.        PSHx:   Past Surgical History:   Procedure Laterality Date    PB MASTECTOMY, PARTIAL Right 3/8/2022    Procedure: MASTECTOMY, PARTIAL - WIRE LOCALIZED;  Surgeon: Josefina Aguilar M.D.;  Location: SURGERY SAME DAY [facility];  Service: General    WA SUSPENSION OF BREAST Right 3/8/2022    Procedure: MASTOPEXY;  Surgeon: Josefina Aguilar M.D.;  Location: SURGERY SAME DAY [facility];  Service: General    HYSTERECTOMY, VAGINAL  2005    OTHER  1963    tonsillectomy       Family history   Family History   Problem Relation Age of Onset    Hypertension Mother     Stroke Mother     Hyperlipidemia Mother     Heart Disease Father         colon    Cancer Paternal Aunt         breast    Colon Cancer Paternal Grandmother     Cancer Paternal Grandmother         Colon    Genetic Disorder Neg Hx     Psychiatric Illness Neg Hx     Thyroid Neg Hx     Diabetes Neg Hx     Dementia Neg Hx          Medications: reviewed on epic.   Outpatient Medications Marked as Taking for the 3/13/24 encounter (Office Visit) with Olegario Hatch M.D.   Medication Sig Dispense Refill    PARoxetine (PAXIL) 20 MG Tab Take 1 Tablet by mouth every day. 30 Tablet 3    ALPRAZolam (XANAX) 0.25 MG Tab Take 1 Tablet by mouth at bedtime as needed for Sleep for up to 30 days. 30 Tablet 2     potassium chloride SA (K-DUR) 10 MEQ Tab CR Take 1 Tablet by mouth every day. 90 Tablet 2    propranolol (INDERAL) 20 MG Tab Take 1 Tablet by mouth every day. 90 Tablet 3    lisinopril (PRINIVIL) 20 MG Tab Take 1 Tablet by mouth every day. 90 Tablet 3    B Complex Vitamins (B COMPLEX PO) Take  by mouth.      Cholecalciferol (VITAMIN D3 PO) Take 400 Units by mouth. Plus calcium 600 mg      acetaminophen (TYLENOL) 500 MG Tab Take 500-1,000 mg by mouth every 6 hours as needed.      Al Hyd-Mg Tr-Alg Ac-Sod Bicarb (GAVISCON-2 PO) Take  by mouth every day.      loratadine (CLARITIN) 10 MG Tab Take 10 mg by mouth as needed.      Magnesium 200 MG Tab Take 200 mg by mouth.          Allergies:   Allergies   Allergen Reactions    Flexeril [Cyclobenzaprine] Unspecified     Insomnia       Social Hx:   Social History     Socioeconomic History    Marital status: Single     Spouse name: Not on file    Number of children: Not on file    Years of education: Not on file    Highest education level: Not on file   Occupational History    Not on file   Tobacco Use    Smoking status: Never     Passive exposure: Never    Smokeless tobacco: Never   Vaping Use    Vaping Use: Never used   Substance and Sexual Activity    Alcohol use: Yes     Alcohol/week: 0.5 oz     Types: 1 drink(s) per week     Comment: rare    Drug use: No    Sexual activity: Not on file   Other Topics Concern     Service No    Blood Transfusions No    Caffeine Concern No    Occupational Exposure No    Hobby Hazards No    Sleep Concern Yes    Stress Concern No    Weight Concern Yes    Special Diet No    Back Care No    Exercise No    Bike Helmet No    Seat Belt Yes    Self-Exams No   Social History Narrative    .     Children: no.     Work: [employer],      Social Determinants of Health     Financial Resource Strain: Not on file   Food Insecurity: Not on file   Transportation Needs: Not on file   Physical Activity: Not on file   Stress: Not on file  "  Social Connections: Not on file   Intimate Partner Violence: Not on file   Housing Stability: Not on file         EXAMINATION     Physical Exam:   Vitals: /80 (BP Location: Right arm, Patient Position: Sitting, BP Cuff Size: Adult)   Pulse 76   Temp 36 °C (96.8 °F) (Temporal)   Ht 1.803 m (5' 11\")   Wt 81.9 kg (180 lb 9.6 oz)   SpO2 96%     Constitutional:   Body Habitus: Body mass index is 25.19 kg/m².  Cooperation: Fully cooperates with exam  Appearance: Well-groomed, well-nourished, not disheveled     Eyes: No scleral icterus to suggest severe liver disease, no proptosis to suggest severe hyperthyroid    ENT -no obvious auditory deficits, no obvious tongue lesions, tongue midline, no facial droop     Skin -no rashes or lesions noted     Respiratory-  breathing comfortable on room air, no audible wheezing    Cardiovascular- capillary refills less than 2 seconds.     Psychiatric- alert and oriented ×3. Normal affect.         Musculoskeletal and Neuro -     Bilateral hands:   Inspection: No swelling,  Deformities or rashes. Symmetric appearing thenar and hyperthenar regions bilaterally.  Palpation no significant tenderness to palpation throughout the bilateral hands  Range of motion is within normal limits throughout bilateral hands, fingers and wrist.  Special tests:  Tinel's at the wrist over the median nerve negative bilaterally  Carpal tunnel compression: negative bilaterally  Phalen's test: negative bilaterally  Finkelstein's test: positive right, negative left  CMC grind test negative bilaterally            MEDICAL DECISION MAKING    Medical records review: see under HPI section.     DATA    Labs:   Lab Results   Component Value Date/Time    SODIUM 138 03/03/2023 08:20 AM    POTASSIUM 4.2 03/03/2023 08:20 AM    CHLORIDE 104 03/03/2023 08:20 AM    CO2 23 03/03/2023 08:20 AM    ANION 11.0 03/03/2023 08:20 AM    GLUCOSE 100 (H) 03/03/2023 08:20 AM    BUN 17 03/03/2023 08:20 AM    CREATININE 0.70 " "03/03/2023 08:20 AM    CALCIUM 9.6 03/03/2023 08:20 AM    ASTSGOT 21 03/03/2023 08:20 AM    ALTSGPT 24 03/03/2023 08:20 AM    TBILIRUBIN 0.6 03/03/2023 08:20 AM    ALBUMIN 4.9 03/03/2023 08:20 AM    TOTPROTEIN 7.3 03/03/2023 08:20 AM    GLOBULIN 2.4 03/03/2023 08:20 AM    AGRATIO 2.0 03/03/2023 08:20 AM   ]    No results found for: \"PROTHROMBTM\", \"INR\"     Lab Results   Component Value Date/Time    WBC 6.3 03/03/2023 08:20 AM    RBC 4.72 03/03/2023 08:20 AM    HEMOGLOBIN 15.6 03/03/2023 08:20 AM    HEMATOCRIT 44.2 03/03/2023 08:20 AM    MCV 93.6 03/03/2023 08:20 AM    MCH 33.1 (H) 03/03/2023 08:20 AM    MCHC 35.3 (H) 03/03/2023 08:20 AM    MPV 10.0 03/03/2023 08:20 AM    NEUTSPOLYS 51.90 03/03/2023 08:20 AM    LYMPHOCYTES 38.00 03/03/2023 08:20 AM    MONOCYTES 8.30 03/03/2023 08:20 AM    EOSINOPHILS 1.00 03/03/2023 08:20 AM    BASOPHILS 0.60 03/03/2023 08:20 AM        Lab Results   Component Value Date/Time    HBA1C 5.3 02/09/2018 07:34 AM        Imaging:   I personally reviewed following images, these are my reads          IMAGING radiology reads. I reviewed the following radiology reads                                                                                        Results for orders placed during the hospital encounter of 08/05/21    DX-LUMBAR SPINE-2 OR 3 VIEWS    Impression  Mild dextroconvex scoliosis and facet arthropathy                         Diagnosis   Visit Diagnoses     ICD-10-CM   1. Right wrist pain  M25.531   2. De Quervain's tenosynovitis, right  M65.4   3. Right hand pain  M79.641           ASSESSMENT AND PLAN:  Mariza Edmonds Bin 64 y.o. female      Mariza was seen today for new patient.    Diagnoses and all orders for this visit:    Right wrist pain  -     Referral to Hand Therapy    De Quervain's tenosynovitis, right  -     Referral to Hand Therapy    Right hand pain  -     Referral to Hand Therapy          Hand/occupational  therapy: I ordered hand/occupational therapy     home " exercise program: We discussed lifestyle modifications including a vertical mouse with minimal wrist movement.    Diagnostic workup: 3/13/2024 I performed a limited diagnostic ultrasound of the right wrist.  There is fluid surrounding the tendons of the first dorsal compartment consistent with de Quervain's tenosynovitis.     Medications:   Increase topical diclofenac to 4 times daily    Interventional program: We discussed an injection but decided to proceed with additional conservative treatments first      Follow-up: as needed with me if the patient fails conservative treatment      Please note that this dictation was created using voice recognition software. I have made every reasonable attempt to correct obvious errors but there may be errors of grammar and content that I may have overlooked prior to finalization of this note.      Olegario Hatch MD  Physical Medicine and Rehabilitation  Interventional Spine and Sports Physiatry  [facility] Janet Holguin M.D.

## 2024-04-17 ENCOUNTER — NON-PROVIDER VISIT (OUTPATIENT)
Dept: INTERNAL MEDICINE | Facility: IMAGING CENTER | Age: 64
End: 2024-04-17
Payer: COMMERCIAL

## 2024-04-17 DIAGNOSIS — Z20.822 ENCOUNTER FOR LABORATORY TESTING FOR COVID-19 VIRUS: ICD-10-CM

## 2024-04-17 LAB
FLUAV RNA SPEC QL NAA+PROBE: NEGATIVE
FLUBV RNA SPEC QL NAA+PROBE: NEGATIVE
RSV RNA SPEC QL NAA+PROBE: NEGATIVE
SARS-COV-2 RNA RESP QL NAA+PROBE: NEGATIVE

## 2024-04-17 PROCEDURE — 0241U POCT CEPHEID COV-2, FLU A/B, RSV - PCR: CPT | Performed by: FAMILY MEDICINE

## 2024-04-22 ENCOUNTER — OFFICE VISIT (OUTPATIENT)
Dept: INTERNAL MEDICINE | Facility: IMAGING CENTER | Age: 64
End: 2024-04-22
Payer: COMMERCIAL

## 2024-04-22 VITALS
RESPIRATION RATE: 17 BRPM | SYSTOLIC BLOOD PRESSURE: 118 MMHG | TEMPERATURE: 97.6 F | OXYGEN SATURATION: 97 % | BODY MASS INDEX: 25.28 KG/M2 | HEIGHT: 71 IN | WEIGHT: 180.56 LBS | DIASTOLIC BLOOD PRESSURE: 62 MMHG | HEART RATE: 65 BPM

## 2024-04-22 DIAGNOSIS — J06.9 UPPER RESPIRATORY TRACT INFECTION, UNSPECIFIED TYPE: ICD-10-CM

## 2024-04-22 DIAGNOSIS — R05.1 ACUTE COUGH: ICD-10-CM

## 2024-04-22 PROCEDURE — 99214 OFFICE O/P EST MOD 30 MIN: CPT | Performed by: FAMILY MEDICINE

## 2024-04-22 PROCEDURE — 3078F DIAST BP <80 MM HG: CPT | Performed by: FAMILY MEDICINE

## 2024-04-22 PROCEDURE — 3074F SYST BP LT 130 MM HG: CPT | Performed by: FAMILY MEDICINE

## 2024-04-22 RX ORDER — BENZONATATE 100 MG/1
100 CAPSULE ORAL 3 TIMES DAILY PRN
Qty: 30 CAPSULE | Refills: 1 | Status: SHIPPED | OUTPATIENT
Start: 2024-04-22

## 2024-04-22 ASSESSMENT — FIBROSIS 4 INDEX: FIB4 SCORE: 0.92

## 2024-04-22 NOTE — PROGRESS NOTES
"Chief Complaint   Patient presents with    URI     Need MD note for illness 4/15-4/20 week of work she missed.       HPI:  Patient is a 64 y.o. female established patient who presents today to obtain a work excuse note. She has been ill with URI and had to miss work 4/15- 4/20/24 due to illness symptoms. Swab done 4/17/24 negative for RSV/COVID/Influenza, and she feels better overall (with exception of lingering dry cough).     Patient Active Problem List    Diagnosis Date Noted    Moderate recurrent major depression (HCC) 02/28/2024    Secondary insomnia 02/28/2024    De Quervain's tenosynovitis, right 02/28/2024    History of ductal carcinoma in situ (DCIS) of breast 03/2022    Vitamin D deficiency 06/17/2019    Generalized anxiety disorder 12/12/2018    Gastroesophageal reflux disease without esophagitis 12/12/2018    Family history of malignant neoplasm of colon 12/30/2016    Essential tremor 12/30/2016    Hypertension 07/15/2015       Past medical, surgical, family, and social history was reviewed and updated in Epic chart by me today.     Medications and allergies reviewed and updated in Epic chart by me today.     ROS:  Pertinent positives listed above in HPI. All other systems have been reviewed and are negative.    PE:   /62 (BP Location: Left arm, Patient Position: Sitting, BP Cuff Size: Adult)   Pulse 65   Temp 36.4 °C (97.6 °F) (Temporal)   Resp 17   Ht 1.803 m (5' 11\")   Wt 81.9 kg (180 lb 8.9 oz)   LMP 12/12/2005   SpO2 97%   BMI 25.18 kg/m²   Vital signs reviewed with patient.     Gen: Well developed; well nourished; no acute distress; non toxic appearance   HEENT: Normocephalic; atraumatic; PEERLA b/l; sclera clear b/l; b/l external auditory canals WNL; b/l TM WNL; nares patent; oropharynx clear; oral mucosa moist; tongue midline; dentition adequate   Neck: No adenopathy; no thyromegaly  CV: Regular rate and rhythm; S1/ S2 present; no murmur, gallop or rub noted  Pulm: No respiratory " distress; clear to ascultation b/l; no wheezing or stridor noted b/l; dry cough  Skin: Warm and dry; no rashes noted   Neuro: No focal deficits noted   Psych: AAOx4; mood and affect are appropriate    A/P:  1. Upper respiratory tract infection, unspecified type  Patient was ill with URI last week and had to miss work 4/15-4/20/25. Swab done 4/17/24 negative for RSV/COVID/Influenza and condition has improved to date. Work note provided for patient to cover missed days, and recommend ongoing supportive care measures for recovery.     2. Acute cough  Patient has lingering cough from URI and recommend PRN cough medication use for management. New RX sent to pharmacy, and patient educated about possibility of cough lasting up to 12 weeks.   - benzonatate (TESSALON) 100 MG Cap; Take 1 Capsule by mouth 3 times a day as needed for Cough.  Dispense: 30 Capsule; Refill: 1

## 2024-05-24 DIAGNOSIS — I10 ESSENTIAL HYPERTENSION: ICD-10-CM

## 2024-05-24 DIAGNOSIS — G25.0 ESSENTIAL TREMOR: ICD-10-CM

## 2024-05-28 PROCEDURE — RXMED WILLOW AMBULATORY MEDICATION CHARGE: Performed by: INTERNAL MEDICINE

## 2024-05-28 RX ORDER — PROPRANOLOL HYDROCHLORIDE 20 MG/1
20 TABLET ORAL
Qty: 90 TABLET | Refills: 0 | Status: SHIPPED | OUTPATIENT
Start: 2024-05-28

## 2024-05-28 RX ORDER — LISINOPRIL 20 MG/1
20 TABLET ORAL
Qty: 90 TABLET | Refills: 0 | Status: SHIPPED | OUTPATIENT
Start: 2024-05-28

## 2024-05-29 PROCEDURE — RXMED WILLOW AMBULATORY MEDICATION CHARGE: Performed by: INTERNAL MEDICINE

## 2024-06-05 ENCOUNTER — PHARMACY VISIT (OUTPATIENT)
Dept: PHARMACY | Facility: MEDICAL CENTER | Age: 64
End: 2024-06-05
Payer: COMMERCIAL

## 2024-06-27 ENCOUNTER — OCCUPATIONAL THERAPY (OUTPATIENT)
Dept: OCCUPATIONAL THERAPY | Facility: REHABILITATION | Age: 64
End: 2024-06-27
Attending: PHYSICAL MEDICINE & REHABILITATION
Payer: COMMERCIAL

## 2024-06-27 DIAGNOSIS — M65.4 DE QUERVAIN'S TENOSYNOVITIS, RIGHT: ICD-10-CM

## 2024-06-27 PROCEDURE — 97110 THERAPEUTIC EXERCISES: CPT

## 2024-06-27 PROCEDURE — 97165 OT EVAL LOW COMPLEX 30 MIN: CPT

## 2024-06-27 ASSESSMENT — ENCOUNTER SYMPTOMS
PAIN SCALE: 0
PAIN SCALE AT HIGHEST: 0
PAIN SCALE AT LOWEST: 0

## 2024-06-27 NOTE — OP THERAPY EVALUATION
Outpatient Occupational Therapy  HAND THERAPY INITIAL EVALUATION    [facility] Occupational Therapy [address]  [address]  [place] [address]  Phone:  [phone]  Fax:  [phone]    Date of Evaluation: 2024    Patient: Mariza Steward  YOB: 1960  MRN: [id number]     Referring Provider: Olegario Hatch M.D.  [address]  [address]  [place] [address]   Referring Diagnosis Pain in right wrist [M25.531];Radial styloid tenosynovitis (de quervain) [M65.4];Pain in right hand [M79.641]     Time Calculation    Start time: 0800  Stop time: 0845 Time Calculation (min): 45 minutes             Chief Complaint: Tendonitis    Visit Diagnoses     ICD-10-CM   1. De Quervain's tenosynovitis, right  M65.4       Subjective:   History of Present Illness:     Date of onset:  2023    Mechanism of injury:    HPI: Mariza Steward 64 y.o.  who presents today with Diagnoses of Right wrist pain, De Quervain's tenosynovitis, right, and Right hand pain were pertinent to this visit.     HPI     Acute on chronic right wrist pain mostly towards the tendons connecting to the right thumb especially with wrist movements and mousing.  She denies radiating pain.  Denies numbness tingling or weakness.    Pain:     Current pain ratin    At best pain ratin    At worst pain ratin  Hand dominance:  Right  Diagnostic Tests:     None    Treatments:     None    Patient Goals:     Patient goals for therapy:  Increased motion, increased strength and independence with ADLs/IADLs      Past Medical History:   Diagnosis Date    Anesthesia 2022    PONV, chills/shaking, motion sickness    Anxiety and depression 2018    Breast cancer (DCIS), right upper medial  2022    Cervicalgia     Depression with anxiety 2022    Gastroesophageal reflux disease without esophagitis 2018    Generalized anxiety disorder 2018    GERD (gastroesophageal reflux disease)      Heart burn 02/24/2022    medicated prn    High cholesterol 02/24/2022    non medicated    Hypertension 02/24/2022    medicated    Indigestion 02/24/2022    medicated prn    Pain 02/24/2022    low back pain    PONV (postoperative nausea and vomiting) 02/24/2022     Past Surgical History:   Procedure Laterality Date    PB MASTECTOMY, PARTIAL Right 3/8/2022    Procedure: MASTECTOMY, PARTIAL - WIRE LOCALIZED;  Surgeon: Josefina Aguilar M.D.;  Location: SURGERY SAME DAY [facility];  Service: General    [place] SUSPENSION OF BREAST Right 3/8/2022    Procedure: MASTOPEXY;  Surgeon: Josefina Aguilar M.D.;  Location: SURGERY SAME DAY [facility];  Service: General    HYSTERECTOMY, VAGINAL  2005    OTHER  1963    tonsillectomy     Social History     Tobacco Use    Smoking status: Never     Passive exposure: Never    Smokeless tobacco: Never   Substance Use Topics    Alcohol use: Yes     Alcohol/week: 0.5 oz     Types: 1 drink(s) per week     Comment: rare     Family and Occupational History     Socioeconomic History    Marital status: Single     Spouse name: Not on file    Number of children: Not on file    Years of education: Not on file    Highest education level: Not on file   Occupational History    Not on file       Objective     Tenderness     Right Wrist/Hand   Tenderness in the first dorsal compartment.     Active Range of Motion     Right Wrist   Normal active range of motion    Left Thumb   Opposition: Span: 15 cm     Right Thumb     Normal active range of motion  Opposition: Span: 13 cm    Right Digits   Normal active range of motion    Strength     Left Wrist/Hand      (2nd hand position)     Trial 1: 46    Right Wrist/Hand      (2nd hand position)     Trial 1: 50    Tests     Right Wrist/Hand   Negative Finkelstein's.     Additional Tests Details  Some tenderness at end range of UD        Therapeutic Exercises (CPT 43106):     1. isolated digit extension strenghtening    2. isolated strengthening of wrist extension  and RD    3. hand strengthening with squeeze ball    4. active opposition    5. AAROM of 1st web space to enhance span    Therapeutic Exercise Summary:  Initiated HEP and to complete 3 x a day  KT tape applied to radial aspect of wrist and circumferentially to minimize UD  Discussed cause of DeQuervain's and conservative treatment       Time-based treatments/modalities:  Occupational Therapy Timed Treatment Charges  Therapeutic exercise minutes (CPT 05991): 15 minutes      Assessment and Plan:   Problem list/assessment: abnormal or restricted ROM, decreased HEP knowledge and limited ADL's    Assessment details:  Patient is a 65 y/o female referred to OT due to right DeQuervain's tendonitis.  Patient would benefit from OT for conservative treatment to minimize symptoms/limitations and enhance functional use of right dominant UE   Barriers to therapy:  None    Goals:   Short Term Goals: increase ADL independence, increase range of motion, increase soft tissue/skin mobility and independent with HEP performance  Short term goal timespan:  2-4 weeks    Long Term Goals:   Patient will have at least 15 cm of right 1st web space span to enhance grasp and function  Patient will have increased knowledge of DeQuervain's tendonitis, causes and conservative treatment strategies to incorporate during daily tasks  Patient will score at least 78/80 on the UEFI   Long term goal timespan:  6-8 weeks    Plan:   Occupational/Hand Therapy options:  Occupational therapy treatment to continue  Planned therapy interventions:  Adaptive training to increase functional performance, home exercise training, patient/family/caregiver education and AROM, A/AROM, PROM, passive stretch  Other planned therapy interventions:  30338 and 81453  Prognosis: excellent    Frequency:  1x week  Duration in weeks:  8  Duration in visits:  8  Discussed with:  Patient  Patient/caregiver understanding of therapy treatment and goals: Good understanding of therapy  treatment and goals      Functional Assessment Used  UEFI = 68/80         Referring provider co-signature:  I have reviewed this plan of care and my co-signature certifies the need for services.    Certification Period: 06/27/2024 to  08/22/24    Physician Signature: ________________________________ Date: ______________

## 2024-07-05 ENCOUNTER — OCCUPATIONAL THERAPY (OUTPATIENT)
Dept: OCCUPATIONAL THERAPY | Facility: REHABILITATION | Age: 64
End: 2024-07-05
Attending: PHYSICAL MEDICINE & REHABILITATION
Payer: COMMERCIAL

## 2024-07-05 DIAGNOSIS — M65.4 DE QUERVAIN'S TENOSYNOVITIS, RIGHT: ICD-10-CM

## 2024-07-05 PROCEDURE — 97110 THERAPEUTIC EXERCISES: CPT

## 2024-07-12 ENCOUNTER — APPOINTMENT (OUTPATIENT)
Dept: OCCUPATIONAL THERAPY | Facility: REHABILITATION | Age: 64
End: 2024-07-12
Attending: PHYSICAL MEDICINE & REHABILITATION
Payer: COMMERCIAL

## 2024-07-19 ENCOUNTER — OCCUPATIONAL THERAPY (OUTPATIENT)
Dept: OCCUPATIONAL THERAPY | Facility: REHABILITATION | Age: 64
End: 2024-07-19
Attending: PHYSICAL MEDICINE & REHABILITATION
Payer: COMMERCIAL

## 2024-07-19 DIAGNOSIS — M65.4 DE QUERVAIN'S TENOSYNOVITIS, RIGHT: ICD-10-CM

## 2024-07-19 PROCEDURE — 97110 THERAPEUTIC EXERCISES: CPT

## 2024-08-09 ENCOUNTER — OCCUPATIONAL THERAPY (OUTPATIENT)
Dept: OCCUPATIONAL THERAPY | Facility: REHABILITATION | Age: 64
End: 2024-08-09
Attending: PHYSICAL MEDICINE & REHABILITATION
Payer: COMMERCIAL

## 2024-08-09 DIAGNOSIS — M65.4 DE QUERVAIN'S TENOSYNOVITIS, RIGHT: ICD-10-CM

## 2024-08-09 PROCEDURE — 97110 THERAPEUTIC EXERCISES: CPT

## 2024-08-09 NOTE — OP THERAPY DISCHARGE SUMMARY
Outpatient Occupational Therapy  DISCHARGE SUMMARY NOTE    [facility] Occupational Therapy [address]  [address]  [place] [address]  Phone:  [phone]  Fax:  [phone]    Date of Visit: 08/09/2024    Patient: Mariza Steward  YOB: 1960  MRN: [id number]     Referring Provider: Olegario Hatch M.D.  [address] [facility]  [address]  [place] [address]   Referring Diagnosis: Pain in right wrist [M25.531];Radial styloid tenosynovitis (de quervain) [M65.4];Pain in right hand [M79.641]         Functional Assessment Used  UEFI = 78/80         Your patient is being discharged from Occupational Therapy with the following comments:   Goals met    Comments:  Has progressed well with conservative OT treatment      Limitations Remaining:  None     Recommendations:  Continue with home program and conservative intervention to minimize aggravation .     Jennifer Altamirano OTR/L    Date: 8/9/2024

## 2024-08-09 NOTE — OP THERAPY DAILY TREATMENT
Outpatient Occupational Therapy  DAILY TREATMENT     [facility] Occupational [address].  [address]  [place] [address]  Phone:  [phone]  Fax:  [phone]    Date: 08/09/2024    Patient: Mariza Steward  YOB: 1960  MRN: [id number]     Time Calculation  Start time: 0800  Stop time: 0845 Time Calculation (min): 45 minutes         Chief Complaint: Tendonitis    Visit #: 4    SUBJECTIVE:  No issues since last visit and agreeable to tx.    OBJECTIVE:  Current objective measures:   Tenderness      Right Wrist/Hand   Tenderness in the first dorsal compartment.      Active Range of Motion      Right Wrist   Normal active range of motion     Left Thumb   Opposition: Span: 15 cm      Right Thumb     Normal active range of motion  Opposition: Span: 15 cm     Right Digits   Normal active range of motion     Strength      Left Wrist/Hand       (2nd hand position)     Trial 1: 54     Right Wrist/Hand       (2nd hand position)     Trial 1: 58     Tests      Right Wrist/Hand   Negative Finkelstein's.         Therapeutic Exercises (CPT 81593):     1. isolated digit extension strenghtening    2. isolated strengthening of wrist extension and RD, 4# hand weight 3 x 10    3. hand strengthening with medium resistance theraputty    4. active opposition    5. AAROM of 1st web space to enhance span    6. 9# dig-flex    7. 11# graded clip    Therapeutic Exercise Summary:    KT tape applied to radial aspect of wrist and circumferentially to minimize UD        Time-based treatments/modalities:  Therapeutic exercise minutes (CPT 62463): 45 minutes        Pain rating before treatment: 0  Pain rating after treatment: 0    ASSESSMENT:   Response to treatment: patient has progressed well in therapy     PLAN/RECOMMENDATIONS:   Plan for treatment: no further treatment needed.  Planned interventions for next visit:  last therapy session today.

## 2024-08-16 ENCOUNTER — APPOINTMENT (OUTPATIENT)
Dept: OCCUPATIONAL THERAPY | Facility: REHABILITATION | Age: 64
End: 2024-08-16
Attending: PHYSICAL MEDICINE & REHABILITATION
Payer: COMMERCIAL

## 2024-08-23 ENCOUNTER — APPOINTMENT (OUTPATIENT)
Dept: OCCUPATIONAL THERAPY | Facility: REHABILITATION | Age: 64
End: 2024-08-23
Attending: PHYSICAL MEDICINE & REHABILITATION
Payer: COMMERCIAL

## 2024-08-28 DIAGNOSIS — I10 ESSENTIAL HYPERTENSION: ICD-10-CM

## 2024-08-28 DIAGNOSIS — G25.0 ESSENTIAL TREMOR: ICD-10-CM

## 2024-08-29 PROCEDURE — RXMED WILLOW AMBULATORY MEDICATION CHARGE: Performed by: FAMILY MEDICINE

## 2024-08-29 RX ORDER — PROPRANOLOL HYDROCHLORIDE 20 MG/1
20 TABLET ORAL
Qty: 90 TABLET | Refills: 1 | Status: SHIPPED | OUTPATIENT
Start: 2024-08-29

## 2024-08-29 RX ORDER — LISINOPRIL 20 MG/1
20 TABLET ORAL
Qty: 90 TABLET | Refills: 1 | Status: SHIPPED | OUTPATIENT
Start: 2024-08-29

## 2024-08-30 ENCOUNTER — APPOINTMENT (OUTPATIENT)
Dept: OCCUPATIONAL THERAPY | Facility: REHABILITATION | Age: 64
End: 2024-08-30
Attending: PHYSICAL MEDICINE & REHABILITATION
Payer: COMMERCIAL

## 2024-09-05 ENCOUNTER — PHARMACY VISIT (OUTPATIENT)
Dept: PHARMACY | Facility: MEDICAL CENTER | Age: 64
End: 2024-09-05
Payer: COMMERCIAL

## 2024-09-06 ENCOUNTER — APPOINTMENT (OUTPATIENT)
Dept: OCCUPATIONAL THERAPY | Facility: REHABILITATION | Age: 64
End: 2024-09-06
Attending: PHYSICAL MEDICINE & REHABILITATION
Payer: COMMERCIAL

## 2024-09-13 ENCOUNTER — APPOINTMENT (OUTPATIENT)
Dept: OCCUPATIONAL THERAPY | Facility: REHABILITATION | Age: 64
End: 2024-09-13
Attending: PHYSICAL MEDICINE & REHABILITATION
Payer: COMMERCIAL

## 2024-09-20 ENCOUNTER — APPOINTMENT (OUTPATIENT)
Dept: OCCUPATIONAL THERAPY | Facility: REHABILITATION | Age: 64
End: 2024-09-20
Attending: PHYSICAL MEDICINE & REHABILITATION
Payer: COMMERCIAL

## 2024-11-23 PROCEDURE — RXMED WILLOW AMBULATORY MEDICATION CHARGE: Performed by: FAMILY MEDICINE

## 2024-11-25 PROCEDURE — RXMED WILLOW AMBULATORY MEDICATION CHARGE: Performed by: FAMILY MEDICINE

## 2024-12-03 ENCOUNTER — PHARMACY VISIT (OUTPATIENT)
Dept: PHARMACY | Facility: MEDICAL CENTER | Age: 64
End: 2024-12-03
Payer: COMMERCIAL

## 2025-02-10 DIAGNOSIS — G25.0 ESSENTIAL TREMOR: ICD-10-CM

## 2025-02-10 DIAGNOSIS — I10 ESSENTIAL HYPERTENSION: ICD-10-CM

## 2025-02-10 PROCEDURE — RXMED WILLOW AMBULATORY MEDICATION CHARGE: Performed by: FAMILY MEDICINE

## 2025-02-10 RX ORDER — LISINOPRIL 20 MG/1
20 TABLET ORAL
Qty: 90 TABLET | Refills: 0 | Status: SHIPPED | OUTPATIENT
Start: 2025-02-10

## 2025-02-11 PROCEDURE — RXMED WILLOW AMBULATORY MEDICATION CHARGE: Performed by: FAMILY MEDICINE

## 2025-02-11 RX ORDER — PROPRANOLOL HCL 20 MG
20 TABLET ORAL
Qty: 90 TABLET | Refills: 0 | Status: SHIPPED | OUTPATIENT
Start: 2025-02-11

## 2025-02-19 ENCOUNTER — PHARMACY VISIT (OUTPATIENT)
Dept: PHARMACY | Facility: MEDICAL CENTER | Age: 65
End: 2025-02-19
Payer: COMMERCIAL

## 2025-02-24 ENCOUNTER — NON-PROVIDER VISIT (OUTPATIENT)
Dept: INTERNAL MEDICINE | Facility: IMAGING CENTER | Age: 65
End: 2025-02-24
Payer: COMMERCIAL

## 2025-02-24 ENCOUNTER — HOSPITAL ENCOUNTER (OUTPATIENT)
Facility: MEDICAL CENTER | Age: 65
End: 2025-02-24
Attending: FAMILY MEDICINE
Payer: COMMERCIAL

## 2025-02-24 DIAGNOSIS — I10 PRIMARY HYPERTENSION: ICD-10-CM

## 2025-02-24 DIAGNOSIS — E55.9 VITAMIN D DEFICIENCY: ICD-10-CM

## 2025-02-24 DIAGNOSIS — Z00.00 HEALTH CARE MAINTENANCE: ICD-10-CM

## 2025-02-24 LAB
25(OH)D3 SERPL-MCNC: 38 NG/ML (ref 30–100)
ALBUMIN SERPL BCP-MCNC: 4.6 G/DL (ref 3.2–4.9)
ALBUMIN/GLOB SERPL: 1.7 G/DL
ALP SERPL-CCNC: 76 U/L (ref 30–99)
ALT SERPL-CCNC: 31 U/L (ref 2–50)
ANION GAP SERPL CALC-SCNC: 12 MMOL/L (ref 7–16)
AST SERPL-CCNC: 23 U/L (ref 12–45)
BASOPHILS # BLD AUTO: 0.5 % (ref 0–1.8)
BASOPHILS # BLD: 0.03 K/UL (ref 0–0.12)
BILIRUB SERPL-MCNC: 0.7 MG/DL (ref 0.1–1.5)
BUN SERPL-MCNC: 17 MG/DL (ref 8–22)
CALCIUM ALBUM COR SERPL-MCNC: 9 MG/DL (ref 8.5–10.5)
CALCIUM SERPL-MCNC: 9.5 MG/DL (ref 8.5–10.5)
CHLORIDE SERPL-SCNC: 101 MMOL/L (ref 96–112)
CHOLEST SERPL-MCNC: 210 MG/DL (ref 100–199)
CO2 SERPL-SCNC: 24 MMOL/L (ref 20–33)
CREAT SERPL-MCNC: 0.91 MG/DL (ref 0.5–1.4)
EOSINOPHIL # BLD AUTO: 0.05 K/UL (ref 0–0.51)
EOSINOPHIL NFR BLD: 0.8 % (ref 0–6.9)
ERYTHROCYTE [DISTWIDTH] IN BLOOD BY AUTOMATED COUNT: 41.7 FL (ref 35.9–50)
EST. AVERAGE GLUCOSE BLD GHB EST-MCNC: 120 MG/DL
FOLATE SERPL-MCNC: 8.5 NG/ML
GFR SERPLBLD CREATININE-BSD FMLA CKD-EPI: 70 ML/MIN/1.73 M 2
GLOBULIN SER CALC-MCNC: 2.7 G/DL (ref 1.9–3.5)
GLUCOSE SERPL-MCNC: 97 MG/DL (ref 65–99)
HBA1C MFR BLD: 5.8 % (ref 4–5.6)
HCT VFR BLD AUTO: 46.4 % (ref 37–47)
HDLC SERPL-MCNC: 51 MG/DL
HGB BLD-MCNC: 16.2 G/DL (ref 12–16)
IMM GRANULOCYTES # BLD AUTO: 0.01 K/UL (ref 0–0.11)
IMM GRANULOCYTES NFR BLD AUTO: 0.2 % (ref 0–0.9)
IRON SATN MFR SERPL: 64 % (ref 15–55)
IRON SERPL-MCNC: 168 UG/DL (ref 40–170)
LDLC SERPL CALC-MCNC: 131 MG/DL
LYMPHOCYTES # BLD AUTO: 2.31 K/UL (ref 1–4.8)
LYMPHOCYTES NFR BLD: 37.5 % (ref 22–41)
MCH RBC QN AUTO: 32.9 PG (ref 27–33)
MCHC RBC AUTO-ENTMCNC: 34.9 G/DL (ref 32.2–35.5)
MCV RBC AUTO: 94.1 FL (ref 81.4–97.8)
MONOCYTES # BLD AUTO: 0.62 K/UL (ref 0–0.85)
MONOCYTES NFR BLD AUTO: 10.1 % (ref 0–13.4)
NEUTROPHILS # BLD AUTO: 3.14 K/UL (ref 1.82–7.42)
NEUTROPHILS NFR BLD: 50.9 % (ref 44–72)
NRBC # BLD AUTO: 0 K/UL
NRBC BLD-RTO: 0 /100 WBC (ref 0–0.2)
PLATELET # BLD AUTO: 329 K/UL (ref 164–446)
PMV BLD AUTO: 9.5 FL (ref 9–12.9)
POTASSIUM SERPL-SCNC: 4.4 MMOL/L (ref 3.6–5.5)
PROT SERPL-MCNC: 7.3 G/DL (ref 6–8.2)
RBC # BLD AUTO: 4.93 M/UL (ref 4.2–5.4)
SODIUM SERPL-SCNC: 137 MMOL/L (ref 135–145)
T4 FREE SERPL-MCNC: 1.21 NG/DL (ref 0.93–1.7)
TIBC SERPL-MCNC: 261 UG/DL (ref 250–450)
TRIGL SERPL-MCNC: 138 MG/DL (ref 0–149)
TSH SERPL-ACNC: 2.86 UIU/ML (ref 0.35–5.5)
UIBC SERPL-MCNC: 93 UG/DL (ref 110–370)
VIT B12 SERPL-MCNC: 608 PG/ML (ref 211–911)
WBC # BLD AUTO: 6.2 K/UL (ref 4.8–10.8)

## 2025-02-24 PROCEDURE — 84439 ASSAY OF FREE THYROXINE: CPT

## 2025-02-24 PROCEDURE — 99999 PR NO CHARGE: CPT

## 2025-02-24 PROCEDURE — 85025 COMPLETE CBC W/AUTO DIFF WBC: CPT

## 2025-02-24 PROCEDURE — 82306 VITAMIN D 25 HYDROXY: CPT

## 2025-02-24 PROCEDURE — 83550 IRON BINDING TEST: CPT

## 2025-02-24 PROCEDURE — 80061 LIPID PANEL: CPT

## 2025-02-24 PROCEDURE — 84443 ASSAY THYROID STIM HORMONE: CPT

## 2025-02-24 PROCEDURE — 80053 COMPREHEN METABOLIC PANEL: CPT

## 2025-02-24 PROCEDURE — 82607 VITAMIN B-12: CPT

## 2025-02-24 PROCEDURE — 83036 HEMOGLOBIN GLYCOSYLATED A1C: CPT

## 2025-02-24 PROCEDURE — 83540 ASSAY OF IRON: CPT

## 2025-02-24 PROCEDURE — 82746 ASSAY OF FOLIC ACID SERUM: CPT

## 2025-02-25 ENCOUNTER — RESULTS FOLLOW-UP (OUTPATIENT)
Dept: INTERNAL MEDICINE | Facility: IMAGING CENTER | Age: 65
End: 2025-02-25

## 2025-03-03 ENCOUNTER — OFFICE VISIT (OUTPATIENT)
Dept: INTERNAL MEDICINE | Facility: IMAGING CENTER | Age: 65
End: 2025-03-03
Payer: COMMERCIAL

## 2025-03-03 ENCOUNTER — APPOINTMENT (OUTPATIENT)
Dept: INTERNAL MEDICINE | Facility: IMAGING CENTER | Age: 65
End: 2025-03-03
Payer: COMMERCIAL

## 2025-03-03 VITALS
BODY MASS INDEX: 25.68 KG/M2 | SYSTOLIC BLOOD PRESSURE: 122 MMHG | RESPIRATION RATE: 17 BRPM | WEIGHT: 183.4 LBS | HEIGHT: 71 IN | OXYGEN SATURATION: 98 % | DIASTOLIC BLOOD PRESSURE: 64 MMHG | TEMPERATURE: 97.9 F | HEART RATE: 72 BPM

## 2025-03-03 DIAGNOSIS — Z00.00 WELLNESS EXAMINATION: ICD-10-CM

## 2025-03-03 DIAGNOSIS — F33.1 MODERATE RECURRENT MAJOR DEPRESSION (HCC): ICD-10-CM

## 2025-03-03 DIAGNOSIS — F41.1 GENERALIZED ANXIETY DISORDER: ICD-10-CM

## 2025-03-03 DIAGNOSIS — I10 PRIMARY HYPERTENSION: ICD-10-CM

## 2025-03-03 DIAGNOSIS — E55.9 VITAMIN D DEFICIENCY: ICD-10-CM

## 2025-03-03 DIAGNOSIS — G47.09 SECONDARY INSOMNIA: ICD-10-CM

## 2025-03-03 DIAGNOSIS — G25.0 ESSENTIAL TREMOR: ICD-10-CM

## 2025-03-03 DIAGNOSIS — L30.9 DERMATITIS: ICD-10-CM

## 2025-03-03 DIAGNOSIS — R73.09 ELEVATED HEMOGLOBIN A1C: ICD-10-CM

## 2025-03-03 DIAGNOSIS — E83.19 IRON OVERLOAD: ICD-10-CM

## 2025-03-03 DIAGNOSIS — Z86.000 HISTORY OF DUCTAL CARCINOMA IN SITU (DCIS) OF BREAST: ICD-10-CM

## 2025-03-03 DIAGNOSIS — K21.9 GASTROESOPHAGEAL REFLUX DISEASE WITHOUT ESOPHAGITIS: ICD-10-CM

## 2025-03-03 DIAGNOSIS — Z80.0 FAMILY HISTORY OF MALIGNANT NEOPLASM OF COLON: ICD-10-CM

## 2025-03-03 PROBLEM — M65.4 DE QUERVAIN'S TENOSYNOVITIS, RIGHT: Status: RESOLVED | Noted: 2024-02-28 | Resolved: 2025-03-03

## 2025-03-03 PROCEDURE — 3074F SYST BP LT 130 MM HG: CPT | Performed by: FAMILY MEDICINE

## 2025-03-03 PROCEDURE — 3078F DIAST BP <80 MM HG: CPT | Performed by: FAMILY MEDICINE

## 2025-03-03 PROCEDURE — 99397 PER PM REEVAL EST PAT 65+ YR: CPT | Performed by: FAMILY MEDICINE

## 2025-03-03 PROCEDURE — RXMED WILLOW AMBULATORY MEDICATION CHARGE: Performed by: FAMILY MEDICINE

## 2025-03-03 RX ORDER — PROPRANOLOL HYDROCHLORIDE 10 MG/1
10 TABLET ORAL DAILY
Qty: 90 TABLET | Refills: 3 | Status: SHIPPED | OUTPATIENT
Start: 2025-03-03

## 2025-03-03 RX ORDER — TRIAMCINOLONE ACETONIDE 1 MG/G
1 CREAM TOPICAL 2 TIMES DAILY
Qty: 15 G | Refills: 1 | Status: SHIPPED | OUTPATIENT
Start: 2025-03-03 | End: 2025-03-18

## 2025-03-03 RX ORDER — ALPRAZOLAM 0.25 MG
0.25 TABLET ORAL NIGHTLY PRN
Qty: 30 TABLET | Refills: 2 | Status: SHIPPED | OUTPATIENT
Start: 2025-03-03 | End: 2025-04-02

## 2025-03-03 ASSESSMENT — PATIENT HEALTH QUESTIONNAIRE - PHQ9
CLINICAL INTERPRETATION OF PHQ2 SCORE: 0
9. THOUGHTS THAT YOU WOULD BE BETTER OFF DEAD, OR OF HURTING YOURSELF: NOT AT ALL
1. LITTLE INTEREST OR PLEASURE IN DOING THINGS: NOT AT ALL
SUM OF ALL RESPONSES TO PHQ9 QUESTIONS 1 AND 2: 0
5. POOR APPETITE OR OVEREATING: NOT AT ALL
3. TROUBLE FALLING OR STAYING ASLEEP OR SLEEPING TOO MUCH: NOT AT ALL
SUM OF ALL RESPONSES TO PHQ QUESTIONS 1-9: 0
2. FEELING DOWN, DEPRESSED, IRRITABLE, OR HOPELESS: NOT AT ALL
4. FEELING TIRED OR HAVING LITTLE ENERGY: NOT AT ALL
6. FEELING BAD ABOUT YOURSELF - OR THAT YOU ARE A FAILURE OR HAVE LET YOURSELF OR YOUR FAMILY DOWN: NOT AL ALL
7. TROUBLE CONCENTRATING ON THINGS, SUCH AS READING THE NEWSPAPER OR WATCHING TELEVISION: NOT AT ALL
8. MOVING OR SPEAKING SO SLOWLY THAT OTHER PEOPLE COULD HAVE NOTICED. OR THE OPPOSITE, BEING SO FIGETY OR RESTLESS THAT YOU HAVE BEEN MOVING AROUND A LOT MORE THAN USUAL: NOT AT ALL

## 2025-03-03 ASSESSMENT — FIBROSIS 4 INDEX: FIB4 SCORE: 0.83

## 2025-03-03 NOTE — PROGRESS NOTES
"Chief Complaint   Patient presents with    Annual Exam    Medication Refill     Xanax and triamcinolone       HPI:  Patient is a 65 y.o. female established patient who presents today for her annual wellness exam, discuss current health concerns, and to review labs done 2/24/25. She is feeling much better overall as compared to our visits in 2024, and she continues to work at [employer].     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     Medications and allergies reviewed and updated in Epic chart by me today.     Lab results 2/24/25 reviewed with patient at visit today.     ROS:  Pertinent positives listed above in HPI. All other systems have been reviewed and are negative.    PE:   /64 (BP Location: Left arm, Patient Position: Sitting, BP Cuff Size: Adult)   Pulse 72   Temp 36.6 °C (97.9 °F) (Temporal)   Resp 17   Ht 1.803 m (5' 11\")   Wt 83.2 kg (183 lb 6.4 oz)   LMP 12/12/2005   SpO2 98%   BMI 25.58 kg/m²   Vital signs reviewed with patient.     Gen: Well developed; well nourished; no acute distress; age appropriate appearance   HEENT: Normocephalic; atraumatic; PEERLA b/l; sclera clear b/l; b/l external auditory canals WNL; b/l TM WNL but cerumen present; nares patent; oropharynx clear; oral mucosa moist; tongue midline; dentition adequate   Neck: No adenopathy; no thyromegaly  CV: Regular " rate and rhythm; S1/ S2 present; no murmur, gallop or rub noted  Pulm: No respiratory distress; clear to ascultation b/l; no wheezing or stridor noted b/l  Abd: Adequate bowel sounds noted; soft and nontender; no rebound, rigidity, nor distention  Extremities: No peripheral edema b/l LE extremities/ no clubbing nor cyanosis noted  Skin: Warm and dry; no rashes noted   Neuro: No focal deficits noted   Psych: AAOx4; mood and affect are appropriate    A/P:  1. Primary hypertension  Stable/ recommend patient continue Lisinopril 20 mg daily use.     2. Essential tremor  Stable/ managed with ongoing Inderal use daily (patient does not feel condition warrants BID/TID use). She is currently taking 25 mg at a time and would like to try higher dose. Recommend patient take Inderal 30 mg per dose and follow clinical response. New 10 mg RX sent to pharmacy, and she recently filled 20 mg RX.   - propranolol (INDERAL) 10 MG Tab; Take 1 Tablet by mouth every day. Total daily dose is 30 mg.  Dispense: 90 Tablet; Refill: 3    3. Family history of malignant neoplasm of colon  Patient declines colon cancer screening at this time.     4. Gastroesophageal reflux disease without esophagitis  Stable/ condition managed with trigger food avoidance and Gaviscon use.     5. Generalized anxiety disorder  Markedly improved at this time.     6. History of ductal carcinoma in situ (DCIS) of breast  Patient has history of abnormal screening  mammogram 12/17/21 (calcifications upper inner quadrant of right breast) and subsequently was diagnosed with right breast DCIS on biopsy. She underwent  wire localized partial mastectomy and mastopexy 3/8/22 by Dr. Aguilar. Pathology c/w ductal carcinoma in situ, intermediate nuclear grade with clear margins, 3 mm in greatest dimension. She declined further treatment after meeting with Dr. Alfaro and Dr. pU at that time. Patient declines repeat imaging at this time.     7. Moderate recurrent major  depression (HCC)  Improved as compared to prior visits without current medication use.     8. Secondary insomnia  Stable/ patient can continue infrequent PRN Xanax use for management. She continues to benefit from Xanax use, denies medication related side effects, and uses this controlled medication in a safe manner.   - ALPRAZolam (XANAX) 0.25 MG Tab; Take 1 Tablet by mouth at bedtime as needed for Sleep for up to 30 days.  Dispense: 30 Tablet; Refill: 2    9. Vitamin D deficiency  Improved/ recommend patient continue current Vitamin D supplementation for maintenance.     10. Dermatitis  Patient can use PRN Kenalog to manage recurrent skin dermatitis issues, and new RX sent to pharmacy.   - triamcinolone acetonide (KENALOG) 0.1 % Cream; Apply 1 Application topically 2 times a day for 7 days.  Dispense: 15 g; Refill: 1    11. Elevated hemoglobin A1c  HgA1c is now 5.8%, and strategies for management discussed with patient at visit today. Recommend patient repeat HgA1c in 3-4 months for ongoing management, and patient encouraged to resume daily exercise.   - HEMOGLOBIN A1C; Future    12. Iron overload  Elevated iron percentage saturation noted and patient is asymptomatic. Denies known iron problems with parents/siblings. She is not current taking iron containing supplementation, and recommend checking related labs with upcoming lab draw for further work up. LFTs WNL on most recent CMP.   - FERRITIN; Future  - IRON/TOTAL IRON BIND; Future  - CRP QUANTITIVE (NON-CARDIAC); Future    13. Wellness examination  Patient remains well informed about medical conditions that need additional attention moving forward. Patient declines repeating dexa scan at this time.      Anticipatory guidance provided today:  Update medical recommendations as discussed above  Continue healthy diet choices  Engage in regular physical activity daily and social activities weekly as tolerated   Follow up with me as detailed above and sooner as  needed

## 2025-03-11 ENCOUNTER — PHARMACY VISIT (OUTPATIENT)
Dept: PHARMACY | Facility: MEDICAL CENTER | Age: 65
End: 2025-03-11
Payer: COMMERCIAL

## 2025-04-30 ENCOUNTER — PATIENT MESSAGE (OUTPATIENT)
Dept: INTERNAL MEDICINE | Facility: IMAGING CENTER | Age: 65
End: 2025-04-30
Payer: COMMERCIAL

## 2025-04-30 DIAGNOSIS — K21.9 GASTROESOPHAGEAL REFLUX DISEASE WITHOUT ESOPHAGITIS: ICD-10-CM

## 2025-05-01 RX ORDER — DEXLANSOPRAZOLE 60 MG/1
1 CAPSULE, DELAYED RELEASE ORAL DAILY
Qty: 90 CAPSULE | Refills: 3 | Status: SHIPPED | OUTPATIENT
Start: 2025-05-01 | End: 2025-05-14

## 2025-05-14 DIAGNOSIS — K21.9 GASTROESOPHAGEAL REFLUX DISEASE WITHOUT ESOPHAGITIS: Primary | ICD-10-CM

## 2025-05-14 RX ORDER — DEXLANSOPRAZOLE 30 MG/1
30 CAPSULE, DELAYED RELEASE ORAL DAILY
Qty: 90 CAPSULE | Refills: 3 | Status: SHIPPED | OUTPATIENT
Start: 2025-05-14

## 2025-05-26 DIAGNOSIS — G25.0 ESSENTIAL TREMOR: ICD-10-CM

## 2025-05-26 DIAGNOSIS — I10 ESSENTIAL HYPERTENSION: ICD-10-CM

## 2025-05-27 RX ORDER — LISINOPRIL 20 MG/1
20 TABLET ORAL
Qty: 90 TABLET | Refills: 0 | Status: SHIPPED | OUTPATIENT
Start: 2025-05-27

## 2025-05-27 RX ORDER — PROPRANOLOL HCL 20 MG
20 TABLET ORAL
Qty: 90 TABLET | Refills: 0 | Status: SHIPPED | OUTPATIENT
Start: 2025-05-27

## 2025-06-04 ENCOUNTER — RESULTS FOLLOW-UP (OUTPATIENT)
Dept: INTERNAL MEDICINE | Facility: IMAGING CENTER | Age: 65
End: 2025-06-04

## 2025-06-04 ENCOUNTER — OFFICE VISIT (OUTPATIENT)
Dept: INTERNAL MEDICINE | Facility: IMAGING CENTER | Age: 65
End: 2025-06-04
Payer: COMMERCIAL

## 2025-06-04 VITALS
RESPIRATION RATE: 17 BRPM | HEART RATE: 63 BPM | TEMPERATURE: 97.9 F | OXYGEN SATURATION: 94 % | BODY MASS INDEX: 25.9 KG/M2 | DIASTOLIC BLOOD PRESSURE: 62 MMHG | WEIGHT: 185 LBS | HEIGHT: 71 IN | SYSTOLIC BLOOD PRESSURE: 120 MMHG

## 2025-06-04 DIAGNOSIS — Z78.9 ACUTE MEDICAL ILLNESS: ICD-10-CM

## 2025-06-04 DIAGNOSIS — K21.9 GASTROESOPHAGEAL REFLUX DISEASE WITHOUT ESOPHAGITIS: ICD-10-CM

## 2025-06-04 DIAGNOSIS — J02.9 SORE THROAT: Primary | ICD-10-CM

## 2025-06-04 PROCEDURE — 3074F SYST BP LT 130 MM HG: CPT | Performed by: FAMILY MEDICINE

## 2025-06-04 PROCEDURE — 87651 STREP A DNA AMP PROBE: CPT | Performed by: FAMILY MEDICINE

## 2025-06-04 PROCEDURE — 99214 OFFICE O/P EST MOD 30 MIN: CPT | Performed by: FAMILY MEDICINE

## 2025-06-04 PROCEDURE — 3078F DIAST BP <80 MM HG: CPT | Performed by: FAMILY MEDICINE

## 2025-06-04 PROCEDURE — 0241U POCT CEPHEID COV-2, FLU A/B, RSV - PCR: CPT | Performed by: FAMILY MEDICINE

## 2025-06-04 ASSESSMENT — FIBROSIS 4 INDEX: FIB4 SCORE: 0.83

## 2025-06-04 NOTE — PROGRESS NOTES
"Chief Complaint   Patient presents with    Gastrophageal Reflux       HPI:  Patient is a 65 y.o. female established patient who presents today for a follow up appointment to discuss current health concerns. Patient has history of chronic GERD (symptoms can be quite severe at times), and she has tried/ failed numerous medications (Gaviscon, Mylanta, Tums, Aciphex, Omeprazole, Zegrid, Prilosec) in the past. She does find benefit from daily Dexilant use as well as ongoing dietary/habit modifications.    Patient also reports sudden onset of sore throat, general malaise, PND, and nasal congestion yesterday without associated N/V/D/F. Patient was hopeful symptoms would pass overnight but they are seemingly worse this morning.     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     Medications and allergies reviewed and updated in Epic chart by me today.     ROS:  Pertinent positives listed above in HPI. All other systems have been reviewed and are negative.    PE:   /62 (BP Location: Left arm, Patient Position: Sitting, BP Cuff Size: Adult)   Pulse 63   Temp 36.6 °C (97.9 °F) (Temporal)   Resp 17   Ht 1.803 m (5' 11\")   Wt 83.9 kg (185 lb)   LMP 12/12/2005   SpO2 94%   BMI 25.80 kg/m²   Vital signs reviewed with patient.     Gen: Well developed; well nourished; no acute distress; age appropriate/ non " toxic appearance   HEENT: Normocephalic; atraumatic; PEERLA b/l; sclera clear b/l; b/l external auditory canals WNL; b/l TM partially obscured by cerumen; nares patent; oropharynx clear; posterior oropharynx red and irritated; oral mucosa moist; tongue midline; dentition adequate   Neck: No adenopathy; no thyromegaly  CV: Regular rate and rhythm; S1/ S2 present; no murmur, gallop or rub noted  Pulm: No respiratory distress; clear to ascultation b/l; no wheezing or stridor noted b/l  Skin: Warm and dry; no rashes noted   Neuro: No focal deficits noted   Psych: AAOx4; mood and affect are appropriate    A/P:  1. Gastroesophageal reflux disease without esophagitis  Chronic condition currently managed with Dexilant daily use as well as ongoing dietary/lifestyle modifications. Patient has failed numerous other drugs for management (Gaviscon, Mylanta, Tums, Aciphex, Omeprazole, Zegrid, Prilosec) in the past, and she needs to continue Dexilant daily use.     2. Sore throat (Primary)  Patient has new sore throat since yesterday and will have Marianna CAMARGO swab her throat today at the visit. I will follow up with patient when results are available.   - POCT GROUP A STREP, PCR    3. Acute medical illness  Patient reports new onset of illness symptoms, as detailed in HPI, yesterday. 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 I will follow up with patient when results are available.   - POCT CoV-2, Flu A/B, RSV by PCR

## 2025-06-08 ENCOUNTER — PHARMACY VISIT (OUTPATIENT)
Dept: PHARMACY | Facility: MEDICAL CENTER | Age: 65
End: 2025-06-08
Payer: COMMERCIAL

## 2025-06-08 PROCEDURE — RXOTC WILLOW AMBULATORY OTC CHARGE

## 2025-06-08 PROCEDURE — RXMED WILLOW AMBULATORY MEDICATION CHARGE: Performed by: FAMILY MEDICINE

## 2025-06-19 PROCEDURE — RXMED WILLOW AMBULATORY MEDICATION CHARGE: Performed by: FAMILY MEDICINE

## 2025-06-20 ENCOUNTER — PHARMACY VISIT (OUTPATIENT)
Dept: PHARMACY | Facility: MEDICAL CENTER | Age: 65
End: 2025-06-20
Payer: COMMERCIAL

## 2025-08-24 DIAGNOSIS — G25.0 ESSENTIAL TREMOR: ICD-10-CM

## 2025-08-24 DIAGNOSIS — I10 ESSENTIAL HYPERTENSION: ICD-10-CM

## 2025-08-24 PROCEDURE — RXMED WILLOW AMBULATORY MEDICATION CHARGE: Performed by: FAMILY MEDICINE

## 2025-08-25 PROCEDURE — RXMED WILLOW AMBULATORY MEDICATION CHARGE: Performed by: FAMILY MEDICINE

## 2025-08-25 RX ORDER — LISINOPRIL 20 MG/1
20 TABLET ORAL
Qty: 90 TABLET | Refills: 2 | Status: SHIPPED | OUTPATIENT
Start: 2025-08-25

## 2025-08-25 RX ORDER — PROPRANOLOL HCL 20 MG
20 TABLET ORAL
Qty: 90 TABLET | Refills: 2 | Status: SHIPPED | OUTPATIENT
Start: 2025-08-25

## 2025-08-29 ENCOUNTER — PHARMACY VISIT (OUTPATIENT)
Dept: PHARMACY | Facility: MEDICAL CENTER | Age: 65
End: 2025-08-29
Payer: COMMERCIAL

## (undated) DEVICE — PACK MINOR BASIN - (2EA/CA)

## (undated) DEVICE — SUTURE 3-0 VICRYL PLUS SH - 8X 18 INCH (12/BX)

## (undated) DEVICE — TUBING CLEARLINK DUO-VENT - C-FLO (48EA/CA)

## (undated) DEVICE — SODIUM CHL IRRIGATION 0.9% 1000ML (12EA/CA)

## (undated) DEVICE — CANISTER SUCTION 3000ML MECHANICAL FILTER AUTO SHUTOFF MEDI-VAC NONSTERILE LF DISP  (40EA/CA)

## (undated) DEVICE — PAD MAGNETIC INSTRUMENT FOAM HOLDER (200/CA)

## (undated) DEVICE — SUTURE 4-0 30CM STRATAFIX SPIRAL PS-2 (12EA/BX)

## (undated) DEVICE — PLUMEPEN ULTRA 3/8 IN X 10 FT HOSE (20EA/CA)

## (undated) DEVICE — KIT  I.V. START (100EA/CA)

## (undated) DEVICE — SET LEADWIRE 5 LEAD BEDSIDE DISPOSABLE ECG (1SET OF 5/EA)

## (undated) DEVICE — GLOVE BIOGEL INDICATOR SZ 6.5 SURGICAL PF LTX - (50PR/BX 4BX/CA)

## (undated) DEVICE — TUBE CONNECTING SUCTION - CLEAR PLASTIC STERILE 72 IN (50EA/CA)

## (undated) DEVICE — CATHETER IV 20 GA X 1-1/4 ---SURG.& SDS ONLY--- (50EA/BX)

## (undated) DEVICE — SUTURE 4-0 MONOCRYL PLUS PS-2 - 27 INCH (36/BX)

## (undated) DEVICE — DRESSING TRANSPARENT FILM TEGADERM 4 X 4.75" (50EA/BX)"

## (undated) DEVICE — DRESSING TRANSPARENT FILM TEGADERM 2.375 X 2.75"  (100EA/BX)"

## (undated) DEVICE — SET EXTENSION WITH 2 PORTS (48EA/CA) ***PART #2C8610 IS A SUBSTITUTE*****

## (undated) DEVICE — KIT ANESTHESIA W/CIRCUIT & 3/LT BAG W/FILTER (20EA/CA)

## (undated) DEVICE — CHLORAPREP 26 ML APPLICATOR - ORANGE TINT(25/CA)

## (undated) DEVICE — BINDER BREAST FLORAL LAVENDER XL 40-45"

## (undated) DEVICE — GLOVE BIOGEL SZ 6 PF LATEX - (50EA/BX 4BX/CA)

## (undated) DEVICE — GLOVE BIOGEL PI ORTHO SZ 6 1/2 SURGICAL PF LF (40PR/BX)

## (undated) DEVICE — SENSOR SPO2 NEO LNCS ADHESIVE (20/BX) SEE USER NOTES

## (undated) DEVICE — SUCTION INSTRUMENT YANKAUER BULBOUS TIP W/O VENT (50EA/CA)

## (undated) DEVICE — SUTURE GENERAL

## (undated) DEVICE — CLOSURE SKIN STRIP 1/2 X 4 IN - (STERI STRIP) (50/BX 4BX/CA)

## (undated) DEVICE — ELECTRODE 850 FOAM ADHESIVE - HYDROGEL RADIOTRNSPRNT (50/PK)

## (undated) DEVICE — CLIP APPLIER OPEN SMALL (6EA/BX)

## (undated) DEVICE — LACTATED RINGERS INJ 1000 ML - (14EA/CA 60CA/PF)

## (undated) DEVICE — PROTECTOR ULNA NERVE - (36PR/CA)

## (undated) DEVICE — SPONGE GAUZESTER. 2X2 4-PL - (2/PK 50PK/BX 30BX/CS)

## (undated) DEVICE — MANIFOLD NEPTUNE 1 PORT (20/PK)

## (undated) DEVICE — TOWEL STOP TIMEOUT SAFETY FLAG (40EA/CA)

## (undated) DEVICE — ELECTRODE DUAL RETURN W/ CORD - (50/PK)

## (undated) DEVICE — MASK ANESTHESIA ADULT  - (100/CA)

## (undated) DEVICE — SLEEVE, VASO, THIGH, MED

## (undated) DEVICE — HEAD HOLDER JUNIOR/ADULT

## (undated) DEVICE — SUTURE 2-0 VICRYL PLUS SH - 8 X 18 INCH (12/BX)

## (undated) DEVICE — CANISTER SUCTION RIGID RED 1500CC (40EA/CA)

## (undated) DEVICE — BLADE ELECTRODE COATED EDGE (50EA/PK)

## (undated) DEVICE — SHEET TRANSVERSE LAP - (12EA/CA)

## (undated) DEVICE — MASK, LARYNGEAL AIRWAY #4

## (undated) DEVICE — GOWN WARMING STANDARD FLEX - (30/CA)